# Patient Record
Sex: FEMALE | Race: WHITE | NOT HISPANIC OR LATINO | Employment: OTHER | URBAN - NONMETROPOLITAN AREA
[De-identification: names, ages, dates, MRNs, and addresses within clinical notes are randomized per-mention and may not be internally consistent; named-entity substitution may affect disease eponyms.]

---

## 2017-03-30 ENCOUNTER — HOSPITAL ENCOUNTER (OUTPATIENT)
Dept: LAB | Facility: MEDICAL CENTER | Age: 62
End: 2017-03-30
Attending: PHYSICIAN ASSISTANT
Payer: COMMERCIAL

## 2017-03-30 ENCOUNTER — OFFICE VISIT (OUTPATIENT)
Dept: URGENT CARE | Facility: PHYSICIAN GROUP | Age: 62
End: 2017-03-30
Payer: COMMERCIAL

## 2017-03-30 ENCOUNTER — APPOINTMENT (OUTPATIENT)
Dept: RADIOLOGY | Facility: IMAGING CENTER | Age: 62
End: 2017-03-30
Attending: PHYSICIAN ASSISTANT
Payer: COMMERCIAL

## 2017-03-30 VITALS
WEIGHT: 270 LBS | TEMPERATURE: 97 F | SYSTOLIC BLOOD PRESSURE: 144 MMHG | BODY MASS INDEX: 47.07 KG/M2 | DIASTOLIC BLOOD PRESSURE: 92 MMHG | OXYGEN SATURATION: 90 % | RESPIRATION RATE: 18 BRPM | HEART RATE: 112 BPM

## 2017-03-30 DIAGNOSIS — R06.02 SOB (SHORTNESS OF BREATH): ICD-10-CM

## 2017-03-30 DIAGNOSIS — R60.9 EDEMA, UNSPECIFIED TYPE: ICD-10-CM

## 2017-03-30 PROCEDURE — 99203 OFFICE O/P NEW LOW 30 MIN: CPT | Performed by: PHYSICIAN ASSISTANT

## 2017-03-30 PROCEDURE — 71020 DX-CHEST-2 VIEWS: CPT | Mod: TC | Performed by: PHYSICIAN ASSISTANT

## 2017-03-30 RX ORDER — ALBUTEROL SULFATE 90 UG/1
2 AEROSOL, METERED RESPIRATORY (INHALATION) EVERY 6 HOURS PRN
COMMUNITY
End: 2017-06-19 | Stop reason: SDUPTHER

## 2017-03-30 RX ORDER — PREDNISONE 20 MG/1
20 TABLET ORAL DAILY
COMMUNITY
End: 2017-04-25

## 2017-03-30 RX ORDER — AZITHROMYCIN 1 G/1
1 POWDER, FOR SUSPENSION ORAL ONCE
COMMUNITY
End: 2017-03-31

## 2017-03-30 RX ORDER — LISINOPRIL AND HYDROCHLOROTHIAZIDE 12.5; 1 MG/1; MG/1
1 TABLET ORAL DAILY
Qty: 30 TAB | Refills: 0 | Status: SHIPPED | OUTPATIENT
Start: 2017-03-30 | End: 2017-04-25 | Stop reason: SDUPTHER

## 2017-03-30 NOTE — MR AVS SNAPSHOT
Mallorie Xie   3/30/2017 9:15 AM   Office Visit   MRN: [id number]    Department:  [facility]   Dept Phone:  [phone]    Description:  Female : 1955   Provider:  Misha Waters PA-C           Reason for Visit     Follow-Up SOB      Allergies as of 3/30/2017     No Known Allergies      You were diagnosed with     SOB (shortness of breath)   [066509]       Edema, unspecified type   [[id number]]         Vital Signs     Blood Pressure Pulse Temperature Respirations Weight Oxygen Saturation    144/92 mmHg 112 36.1 °C (97 °F) 18 122.471 kg (270 lb) 90%    Smoking Status                   Former Smoker           Basic Information     Date Of Birth Sex Race Ethnicity Preferred Language    1955 Female White Non- English      Your appointments     Mar 31, 2017  6:15 AM   Adult Draw/Collection with [facility] (--)    [address] [place] [address]   [phone]            Mar 31, 2017  9:20 AM   NEW TO YOU with KRISTAL Edmonds   [facility] [place]    [address]  [place] [id number] [phone]              Problem List              ICD-10-CM Priority Class Noted - Resolved    Tobacco abuse Z72.0   Unknown - Present    Radiculopathy of arm M54.10   2012 - Present    Upper extremity pain, diffuse M79.603   2012 - Present    Left shoulder pain M25.512   2014 - Present    Upper back pain on left side M54.9   2014 - Present      Health Maintenance        Date Due Completion Dates    IMM DTaP/Tdap/Td Vaccine (1 - Tdap) 1972 ---    IMM PNEUMOCOCCAL 19-64 (ADULT) MEDIUM RISK SERIES (1 of 1 - PPSV23) 1972 ---    PAP SMEAR 1974 ---    MAMMOGRAM 1993 ---    COLONOSCOPY 2003 ---    IMM ZOSTER VACCINE 2013 ---    IMM INFLUENZA (1) 2016 ---            Current Immunizations     No immunizations on file.      Below and/or attached are the medications your provider expects you to  take. Review all of your home medications and newly ordered medications with your provider and/or pharmacist. Follow medication instructions as directed by your provider and/or pharmacist. Please keep your medication list with you and share with your provider. Update the information when medications are discontinued, doses are changed, or new medications (including over-the-counter products) are added; and carry medication information at all times in the event of emergency situations     Allergies:  No Known Allergies          Medications  Valid as of: March 30, 2017 - 10:59 AM    Generic Name Brand Name Tablet Size Instructions for use    Albuterol Sulfate (Aero Soln) albuterol 108 (90 BASE) MCG/ACT Inhale 2 Puffs by mouth every 6 hours as needed for Shortness of Breath.        Azithromycin (Pack) ZITHROMAX 1 GM Take 1 Packet by mouth Once.        Carisoprodol (Tab) SOMA 350 MG Take 1 Tab by mouth 2 times a day as needed for Muscle Spasms.        Cyclobenzaprine HCl (Tab) FLEXERIL 5 MG Take 1-2 Tabs by mouth at bedtime as needed for Mild Pain.        Hydrocodone-Acetaminophen (Tab) NORCO  MG Take 1 Tab by mouth every 6 hours as needed for Mild Pain.        Lisinopril-Hydrochlorothiazide (Tab) PRINZIDE, ZESTORETIC 10-12.5 MG Take 1 Tab by mouth every day.        Naproxen (Tab) NAPROSYN 500 MG Take 1 Tab by mouth 2 times a day, with meals.        Polymyxin B-Trimethoprim (Solution) POLYTRIM 82272-2.1 UNIT/ML-% Place 1 Drop in both eyes every 4 hours.        PredniSONE (Tab) DELTASONE 20 MG Take 20 mg by mouth every day.        TiZANidine HCl (Tab) ZANAFLEX 4 MG Take 1 Tab by mouth every 6 hours as needed.        TraMADol HCl (Tab) ULTRAM 50 MG Take 1-2 Tabs by mouth every four hours as needed for Mild Pain.        .                 Medicines prescribed today were sent to:     [facility] - [address]    [address] [address]    Phone: [phone] Fax:  [phone]    Open 24 Hours?: No      Medication refill instructions:       If your prescription bottle indicates you have medication refills left, it is not necessary to call your provider’s office. Please contact your pharmacy and they will refill your medication.    If your prescription bottle indicates you do not have any refills left, you may request refills at any time through one of the following ways: The online App DreamWorks system (except Urgent Care), by calling your provider’s office, or by asking your pharmacy to contact your provider’s office with a refill request. Medication refills are processed only during regular business hours and may not be available until the next business day. Your provider may request additional information or to have a follow-up visit with you prior to refilling your medication.   *Please Note: Medication refills are assigned a new Rx number when refilled electronically. Your pharmacy may indicate that no refills were authorized even though a new prescription for the same medication is available at the pharmacy. Please request the medicine by name with the pharmacy before contacting your provider for a refill.        Your To Do List     Future Labs/Procedures Complete By Expires    DX-CHEST-2 VIEWS  As directed 3/30/2018         App DreamWorks Access Code: [email or web address]  Expires: 4/29/2017  8:59 AM    App DreamWorks  A secure, online tool to manage your health information     [employer]’s App DreamWorks® is a secure, online tool that connects you to your personalized health information from the privacy of your home -- day or night - making it very easy for you to manage your healthcare. Once the activation process is completed, you can even access your medical information using the App DreamWorks anupama, which is available for free in the Apple Anupama store or Google Play store.     App DreamWorks provides the following levels of access (as shown below):   My Chart Features   [facility] Primary Care Doctor  [facility]  Specialists [facility]  Urgent  Care Non-[facility]  Primary Care  Doctor   Email your healthcare team securely and privately 24/7 X X X    Manage appointments: schedule your next appointment; view details of past/upcoming appointments X      Request prescription refills. X      View recent personal medical records, including lab and immunizations X X X X   View health record, including health history, allergies, medications X X X X   Read reports about your outpatient visits, procedures, consult and ER notes X X X X   See your discharge summary, which is a recap of your hospital and/or ER visit that includes your diagnosis, lab results, and care plan. X X       How to register for [employer]:  1. Go to  [email or web address].  2. Click on the Sign Up Now box, which takes you to the New Member Sign Up page. You will need to provide the following information:  a. Enter your CityS[employer]er Access Code exactly as it appears at the top of this page. (You will not need to use this code after you’ve completed the sign-up process. If you do not sign up before the expiration date, you must request a new code.)   b. Enter your date of birth.   c. Enter your home email address.   d. Click Submit, and follow the next screen’s instructions.  3. Create a CitySlicker ID. This will be your [employer]er login ID and cannot be changed, so think of one that is secure and easy to remember.  4. Create a [employer]er password. You can change your password at any time.  5. Enter your Password Reset Question and Answer. This can be used at a later time if you forget your password.   6. Enter your e-mail address. This allows you to receive e-mail notifications when new information is available in [employer]Slicker.  7. Click Sign Up. You can now view your health information.    For assistance activating your [employer]er account, call [phone]

## 2017-03-30 NOTE — PROGRESS NOTES
Chief Complaint   Patient presents with   • Follow-Up     SOB       HISTORY OF PRESENT ILLNESS: Patient is a 61 y.o. female who presents today because she has had a 2 to three-week history of a cough. She reports over 70 pound weight gain in the last 6 months since she has quit smoking.  she denies any fevers, chills, nausea, vomiting or diarrhea. She was seen in a different urgent care 4 days ago, prescribed azithromycin, albuterol inhaler, steroids. She has had no improvement with her symptoms since then. She does report a new onset of some lower extremity edema over the last 4 or 5 days. She has not had any improvement with her current regimen. She reports increasing shortness of breath with activity, no shortness of breath at rest. She denies any specific orthopnea, but no significant difference from her regular activity-related shortness of breath recently.    Patient Active Problem List    Diagnosis Date Noted   • Left shoulder pain 08/27/2014   • Upper back pain on left side 08/27/2014   • Radiculopathy of arm 12/04/2012   • Upper extremity pain, diffuse 12/04/2012   • Tobacco abuse        Allergies:Review of patient's allergies indicates no known allergies.    Current Outpatient Prescriptions Ordered in [facility]   Medication Sig Dispense Refill   • albuterol 108 (90 BASE) MCG/ACT Aero Soln inhalation aerosol Inhale 2 Puffs by mouth every 6 hours as needed for Shortness of Breath.     • predniSONE (DELTASONE) 20 MG Tab Take 20 mg by mouth every day.     • azithromycin (ZITHROMAX) 1 GM powder Take 1 Packet by mouth Once.     • lisinopril-hydrochlorothiazide (PRINZIDE, ZESTORETIC) 10-12.5 MG per tablet Take 1 Tab by mouth every day. 30 Tab 0   • cyclobenzaprine (FLEXERIL) 5 MG tablet Take 1-2 Tabs by mouth at bedtime as needed for Mild Pain. 30 Tab 0   • naproxen (NAPROSYN) 500 MG TABS Take 1 Tab by mouth 2 times a day, with meals. 60 Tab 3   • hydrocodone/acetaminophen (NORCO)  MG TABS Take 1 Tab by mouth  every 6 hours as needed for Mild Pain. 60 Each 1   • tramadol (ULTRAM) 50 MG TABS Take 1-2 Tabs by mouth every four hours as needed for Mild Pain. 30 Tab 0   • tizanidine (ZANAFLEX) 4 MG TABS Take 1 Tab by mouth every 6 hours as needed. 30 Tab 1   • carisoprodol (SOMA) 350 MG TABS Take 1 Tab by mouth 2 times a day as needed for Muscle Spasms. 60 Each 0   • polymixin-trimethoprim (POLYTRIM) 80202-7.1 UNIT/ML-% SOLN Place 1 Drop in both eyes every 4 hours. 15 mL 0     No current Epic-ordered facility-administered medications on file.       Past Medical History   Diagnosis Date   • Obesity    • Tobacco abuse        Social History   Substance Use Topics   • Smoking status: Former Smoker -- 0.50 packs/day     Quit date: 10/30/2016   • Smokeless tobacco: Never Used   • Alcohol Use: No       No family status information on file.     Family History   Problem Relation Age of Onset   • Arthritis Mother        ROS:  Review of Systems   Constitutional: Negative for fever, chills, weight loss and malaise/fatigue.   HENT: Negative for ear pain, nosebleeds, congestion, sore throat and neck pain.    Eyes: Negative for blurred vision.   Respiratory: Positive for cough, no sputum production, positive for shortness of breath and no wheezing.    Cardiovascular: Negative for chest pain, palpitations, orthopnea and positive for leg swelling.   Gastrointestinal: Negative for heartburn, nausea, vomiting and abdominal pain.   Genitourinary: Negative for dysuria, urgency and frequency.     Exam:  Blood pressure 144/92, pulse 112, temperature 36.1 °C (97 °F), resp. rate 18, weight 122.471 kg (270 lb), SpO2 88 %.  General:  Well nourished, well developed female in NAD  Head:Normocephalic, atraumatic  Eyes: PERRLA, EOM within normal limits, no conjunctival injection, no scleral icterus, visual fields and acuity grossly intact.  Ears: Normal shape and symmetry, no tenderness, no discharge. External canals are without any significant edema or  erythema. Tympanic membranes are without any inflammation, no effusion. Gross auditory acuity is intact  Nose: Symmetrical without tenderness, no discharge.  Mouth: reasonable hygiene, no erythema exudates or tonsillar enlargement.  Neck: no masses, range of motion within normal limits, no tracheal deviation. No obvious thyroid enlargement.  Pulmonary: chest is symmetrical with respiration, no wheezes, crackles, or rhonchi.  Cardiovascular: regular rate and rhythm without murmurs, rubs, or gallops.  Extremities: no clubbing, cyanosis, has 1-2+ edema, although difficult to assess secondary to body habitus    Chest x-ray per radiology interpretation:  1.  Enlarged cardiac silhouette without evidence of failure.  2.  Questionable enlargement of the vascular structures in the right hilum versus underlying mass. CT of the chest with contrast is recommended.  3.  Ill-defined lower lobe opacities could be due to atelectasis or artifact because of patient's large body habitus.             Please note that this dictation was created using voice recognition software. I have made every reasonable attempt to correct obvious errors, but I expect that there are errors of grammar and possibly content that I did not discover before finalizing the note.    Assessment/Plan:  1. SOB (shortness of breath)  DX-CHEST-2 VIEWS   2. Edema, unspecified type  CBC WITH DIFFERENTIAL    COMP METABOLIC PANEL    LIPID PANEL    TSH    B TYPE NATRIURETIC    lisinopril-hydrochlorothiazide (PRINZIDE, ZESTORETIC) 10-12.5 MG per tablet    expressed my concern to the patient regarding her current symptoms, no evidence of acute CHF on chest x-ray but she does have an enlarged cardiac silhouette. I have recommended the above medication, ordered labs. And gave her very specific instructions to go to the emergency room in the next 2-3 days if she is not significantly improving, sooner if symptoms worsen or change.    Followup with primary care in the next  7-10 days if not significantly improving, return to the urgent care or go to the emergency room sooner for any worsening of symptoms.

## 2017-03-31 ENCOUNTER — OFFICE VISIT (OUTPATIENT)
Dept: MEDICAL GROUP | Facility: PHYSICIAN GROUP | Age: 62
End: 2017-03-31
Payer: COMMERCIAL

## 2017-03-31 ENCOUNTER — HOSPITAL ENCOUNTER (OUTPATIENT)
Dept: LAB | Facility: MEDICAL CENTER | Age: 62
End: 2017-03-31
Attending: PHYSICIAN ASSISTANT
Payer: COMMERCIAL

## 2017-03-31 ENCOUNTER — TELEPHONE (OUTPATIENT)
Dept: MEDICAL GROUP | Facility: PHYSICIAN GROUP | Age: 62
End: 2017-03-31

## 2017-03-31 VITALS
OXYGEN SATURATION: 88 % | SYSTOLIC BLOOD PRESSURE: 132 MMHG | DIASTOLIC BLOOD PRESSURE: 90 MMHG | WEIGHT: 263 LBS | BODY MASS INDEX: 46.6 KG/M2 | HEART RATE: 92 BPM | HEIGHT: 63 IN | TEMPERATURE: 98.8 F | RESPIRATION RATE: 24 BRPM

## 2017-03-31 DIAGNOSIS — Z72.0 TOBACCO ABUSE: ICD-10-CM

## 2017-03-31 DIAGNOSIS — E66.01 MORBID OBESITY WITH BMI OF 45.0-49.9, ADULT (HCC): ICD-10-CM

## 2017-03-31 DIAGNOSIS — R93.89 ABNORMAL CHEST X-RAY: ICD-10-CM

## 2017-03-31 DIAGNOSIS — Z87.891 FORMER CIGARETTE SMOKER: ICD-10-CM

## 2017-03-31 DIAGNOSIS — I10 ESSENTIAL HYPERTENSION: ICD-10-CM

## 2017-03-31 DIAGNOSIS — R05.9 COUGH: ICD-10-CM

## 2017-03-31 DIAGNOSIS — R06.02 SHORTNESS OF BREATH: ICD-10-CM

## 2017-03-31 LAB
ALBUMIN SERPL BCP-MCNC: 3.9 G/DL (ref 3.2–4.9)
ALBUMIN/GLOB SERPL: 1.3 G/DL
ALP SERPL-CCNC: 75 U/L (ref 30–99)
ALT SERPL-CCNC: 23 U/L (ref 2–50)
ANION GAP SERPL CALC-SCNC: 5 MMOL/L (ref 0–11.9)
AST SERPL-CCNC: 17 U/L (ref 12–45)
BASOPHILS # BLD AUTO: 0.07 K/UL (ref 0–0.12)
BASOPHILS NFR BLD AUTO: 0.7 % (ref 0–1.8)
BILIRUB SERPL-MCNC: 0.8 MG/DL (ref 0.1–1.5)
BNP SERPL-MCNC: 52 PG/ML (ref 0–100)
BUN SERPL-MCNC: 20 MG/DL (ref 8–22)
CALCIUM SERPL-MCNC: 9.7 MG/DL (ref 8.5–10.5)
CHLORIDE SERPL-SCNC: 98 MMOL/L (ref 96–112)
CHOLEST SERPL-MCNC: 211 MG/DL (ref 100–199)
CO2 SERPL-SCNC: 35 MMOL/L (ref 20–33)
CREAT SERPL-MCNC: 0.68 MG/DL (ref 0.5–1.4)
EOSINOPHIL # BLD: 0.11 K/UL (ref 0–0.51)
EOSINOPHIL NFR BLD AUTO: 1.1 % (ref 0–6.9)
ERYTHROCYTE [DISTWIDTH] IN BLOOD BY AUTOMATED COUNT: 49.1 FL (ref 35.9–50)
GLOBULIN SER CALC-MCNC: 3.1 G/DL (ref 1.9–3.5)
GLUCOSE SERPL-MCNC: 80 MG/DL (ref 65–99)
HCT VFR BLD AUTO: 52.9 % (ref 37–47)
HDLC SERPL-MCNC: 68 MG/DL
HGB BLD-MCNC: 17.2 G/DL (ref 12–16)
IMM GRANULOCYTES # BLD AUTO: 0.05 K/UL (ref 0–0.11)
IMM GRANULOCYTES NFR BLD AUTO: 0.5 % (ref 0–0.9)
LDLC SERPL CALC-MCNC: 107 MG/DL
LYMPHOCYTES # BLD: 2.18 K/UL (ref 1–4.8)
LYMPHOCYTES NFR BLD AUTO: 22.6 % (ref 22–41)
MCH RBC QN AUTO: 30.8 PG (ref 27–33)
MCHC RBC AUTO-ENTMCNC: 32.5 G/DL (ref 33.6–35)
MCV RBC AUTO: 94.8 FL (ref 81.4–97.8)
MONOCYTES # BLD: 0.7 K/UL (ref 0–0.85)
MONOCYTES NFR BLD AUTO: 7.2 % (ref 0–13.4)
NEUTROPHILS # BLD: 6.55 K/UL (ref 2–7.15)
NEUTROPHILS NFR BLD AUTO: 67.9 % (ref 44–72)
NRBC # BLD AUTO: 0 K/UL
NRBC BLD-RTO: 0 /100 WBC
PLATELET # BLD AUTO: 198 K/UL (ref 164–446)
PMV BLD AUTO: 11.6 FL (ref 9–12.9)
POTASSIUM SERPL-SCNC: 4.2 MMOL/L (ref 3.6–5.5)
PROT SERPL-MCNC: 7 G/DL (ref 6–8.2)
RBC # BLD AUTO: 5.58 M/UL (ref 4.2–5.4)
SODIUM SERPL-SCNC: 138 MMOL/L (ref 135–145)
TRIGL SERPL-MCNC: 180 MG/DL (ref 0–149)
TSH SERPL DL<=0.005 MIU/L-ACNC: 9.41 UIU/ML (ref 0.3–3.7)
WBC # BLD AUTO: 9.7 K/UL (ref 4.8–10.8)

## 2017-03-31 PROCEDURE — 80053 COMPREHEN METABOLIC PANEL: CPT

## 2017-03-31 PROCEDURE — 84443 ASSAY THYROID STIM HORMONE: CPT

## 2017-03-31 PROCEDURE — 83880 ASSAY OF NATRIURETIC PEPTIDE: CPT

## 2017-03-31 PROCEDURE — 85025 COMPLETE CBC W/AUTO DIFF WBC: CPT

## 2017-03-31 PROCEDURE — 99214 OFFICE O/P EST MOD 30 MIN: CPT | Performed by: NURSE PRACTITIONER

## 2017-03-31 PROCEDURE — 80061 LIPID PANEL: CPT

## 2017-03-31 PROCEDURE — 36415 COLL VENOUS BLD VENIPUNCTURE: CPT

## 2017-03-31 RX ORDER — AZITHROMYCIN 250 MG/1
TABLET, FILM COATED ORAL
COMMUNITY
Start: 2017-03-27 | End: 2017-04-25

## 2017-03-31 ASSESSMENT — PATIENT HEALTH QUESTIONNAIRE - PHQ9: CLINICAL INTERPRETATION OF PHQ2 SCORE: 0

## 2017-03-31 NOTE — PROGRESS NOTES
Chief Complaint   Patient presents with   • Shortness of Breath     [facility]         This is a 61 y.o.female patient that presents today with the following: Follow-up urgent care visit, acute and chronic conditions    Former cigarette smoker  Patient is former cigarette smoker, recently quit smoking in November 2016. She smoked for approximately 46 years, at least a pack a day. She does have a concern for lung cancer. Her sister was recently diagnosed with lung cancer. She does deny symptoms of lung cancer even though she is currently recovering from an upper respiratory illness. I did discuss with her our lung cancer screening program and she is interested in being referred to it. Referral has been placed.    Morbid obesity with BMI of 45.0-49.9, adult (HCC)  This is chronic in nature, uncontrolled. Patient's weight is 263 pounds, BMI 46.59. She does admit to excessive calorie intake, especially since tobacco cessation. She has in the last week made significant dietary changes, but has been unable to increase her physical activity due to her current respiratory illness. When she is feeling better she plans on increasing her physical activity and will continue with her dietary changes. She does understand the risks her obesity is imposing on her health especially her breathing.    Cough  Patient seen in [facility] urgent care yesterday for 2 week history of cough. She was seen in a different urgent care and started on Zithromax, prednisone and albuterol inhaler without significant improvement. She did have an abnormal chest x-ray done yesterday, showed questionable mass of the right hilum versus enlargement of the vascular structure--follow-up CT with contrast was recommended. We will order that today. She was also started on lisinopril-hydrochlorothiazide 10-12.5 mg. Her blood pressure today is 132/90, but she does admit to not taking this medication today. Labs were ordered as well, these were drawn this morning.  I told her that I will not have these results until Monday, and which I will call her with results if abnormal. Otherwise we will discuss at her follow-up appointment with me in one week.  Her oxygen saturation upon initial measurements of vital signs were 88%. She had not used her albuterol inhaler for a few hours, I instructed her to use as directed, and after 5 minutes her saturations were up to 92-93% on room air. I encouraged her to use this as directed, 2 puffs every 4-6 hours as needed. Because she is a past smoker, I did discuss the possibility of COPD and the need to be referred to pulmonology as well. In the interim, we will start her on a steroid inhaler, Advair 250-50 one puff twice a day.  Because she has to walk up and down stairs for her job, I would like her to stay out of work for at least one more week and she is to follow-up with me next Friday on April 7. Note was given for work. She was given strict precautions to go to the emergency room for worsening shortness of breath and cough, chest pain. Patient verbalized understanding and is agreeable to plan.    Shortness of breath  See additional notes    Essential hypertension  Fairly new condition, recently diagnosed in urgent care and started on lisinopril-hydrochlorothiazide. Blood pressure today is 132/90, however she did not take her medication this morning. Thus far, she has tolerated the medication well with no significant or bothersome side effects--she has only taken for one day though. I did discuss the risks, benefits and common side effects of this medication. She is continue taking as prescribed. She has had labs drawn today, I will call her with results and further actions if needed.      No results found for any previous visit.      clinical course has fluctuated    Past Medical History   Diagnosis Date   • Obesity    • Tobacco abuse    • Essential hypertension 3/31/2017       No past surgical history on file.    Family History  "  Problem Relation Age of Onset   • Arthritis Mother        Bee venom    Current Outpatient Prescriptions Ordered in [facility]   Medication Sig Dispense Refill   • fluticasone-salmeterol (ADVAIR) 250-50 MCG/DOSE AEROSOL POWDER, BREATH ACTIVATED Inhale 1 Puff by mouth every 12 hours. 1 Inhaler 1   • albuterol 108 (90 BASE) MCG/ACT Aero Soln inhalation aerosol Inhale 2 Puffs by mouth every 6 hours as needed for Shortness of Breath.     • predniSONE (DELTASONE) 20 MG Tab Take 20 mg by mouth every day.     • lisinopril-hydrochlorothiazide (PRINZIDE, ZESTORETIC) 10-12.5 MG per tablet Take 1 Tab by mouth every day. 30 Tab 0   • azithromycin (ZITHROMAX) 250 MG Tab        No current Epic-ordered facility-administered medications on file.       Constitutional ROS: No unexpected change in weight, No weakness, No unexplained fevers, sweats, or chills  Neck ROS: No lumps or masses, No swollen glands, No significant pain in neck  Pulmonary ROS: Positive per HPI  Cardiovascular ROS: No chest pain, No palpitations, Positive for edema, BLE  Gastrointestinal ROS: No abdominal pain, No nausea, vomiting, diarrhea, or constipation, no blood in stool  Musculoskeletal/Extremities ROS: No clubbing, No cyanosis, No pain, redness or swelling on the joints  Neurologic ROS: Normal development, No seizures, No weakness    Physical exam:  /90 mmHg  Pulse 92  Temp(Src) 37.1 °C (98.8 °F)  Resp 24  Ht 1.6 m (5' 3\")  Wt 119.296 kg (263 lb)  BMI 46.60 kg/m2  SpO2 88%  General Appearance: -aged female, middlealert, mild distress, morbidly obese  Skin: Skin color, texture, turgor normal. No rashes or lesions.  Lungs: negative findings: normal respiratory rate and rhythm, lungs clear to auscultation  Heart: negative. RRR without murmur, gallop, or rubs.  No ectopy.  Abdomen: Abdomen soft, non-tender. BS normal. No masses,  No organomegaly  Musculoskeletal: negative  Neurologic: intact, oriented, mood appropriate, judgement intact. Cranial " nerves grossly intact    Medical decision making/discussion: Pt here s/p UC and ER visit and persist cough and shortness of breath. Encouraged pt to use albuterol inhaler consistently as prescribed. Will get CT of chest as recommended, as CXR was abnormal. She was referred to lung cancer screening program due to history. She was given note for work and I will see her in follow up in 1 week. She is to go to ER for worsening symptoms as discussed.     Mallorie was seen today for shortness of breath.    Diagnoses and all orders for this visit:    Shortness of breath  -     fluticasone-salmeterol (ADVAIR) 250-50 MCG/DOSE AEROSOL POWDER, BREATH ACTIVATED; Inhale 1 Puff by mouth every 12 hours.    Cough  -     CT-CHEST (THORAX) WITH; Future    Essential hypertension    Former cigarette smoker  -     REFERRAL TO LUNG CANCER SCREENING PROGRAM    Tobacco abuse    Morbid obesity with BMI of 45.0-49.9, adult (CMS-[facility])  -     Patient identified as having weight management issue.  Appropriate orders and counseling given.    Abnormal chest x-ray  -     CT-CHEST (THORAX) WITH; Future          Please note that this dictation was created using voice recognition software. I have made every reasonable attempt to correct obvious errors, but I expect that there are errors of grammar and possibly content that I did not discover before finalizing the note.

## 2017-03-31 NOTE — ASSESSMENT & PLAN NOTE
Patient seen in [facility] urgent care yesterday for 2 week history of cough. She was seen in a different urgent care and started on Zithromax, prednisone and albuterol inhaler without significant improvement. She did have an abnormal chest x-ray done yesterday, showed questionable mass of the right hilum versus enlargement of the vascular structure--follow-up CT with contrast was recommended. We will order that today. She was also started on lisinopril-hydrochlorothiazide 10-12.5 mg. Her blood pressure today is 132/90, but she does admit to not taking this medication today. Labs were ordered as well, these were drawn this morning. I told her that I will not have these results until Monday, and which I will call her with results if abnormal. Otherwise we will discuss at her follow-up appointment with me in one week.  Her oxygen saturation upon initial measurements of vital signs were 88%. She had not used her albuterol inhaler for a few hours, I instructed her to use as directed, and after 5 minutes her saturations were up to 92-93% on room air. I encouraged her to use this as directed, 2 puffs every 4-6 hours as needed. Because she is a past smoker, I did discuss the possibility of COPD and the need to be referred to pulmonology as well. In the interim, we will start her on a steroid inhaler, Advair 250-50 one puff twice a day.  Because she has to walk up and down stairs for her job, I would like her to stay out of work for at least one more week and she is to follow-up with me next Friday on April 7. Note was given for work. She was given strict precautions to go to the emergency room for worsening shortness of breath and cough, chest pain. Patient verbalized understanding and is agreeable to plan.

## 2017-03-31 NOTE — ASSESSMENT & PLAN NOTE
This is chronic in nature, uncontrolled. Patient's weight is 263 pounds, BMI 46.59. She does admit to excessive calorie intake, especially since tobacco cessation. She has in the last week made significant dietary changes, but has been unable to increase her physical activity due to her current respiratory illness. When she is feeling better she plans on increasing her physical activity and will continue with her dietary changes. She does understand the risks her obesity is imposing on her health especially her breathing.

## 2017-03-31 NOTE — ASSESSMENT & PLAN NOTE
Fairly new condition, recently diagnosed in urgent care and started on lisinopril-hydrochlorothiazide. Blood pressure today is 132/90, however she did not take her medication this morning. Thus far, she has tolerated the medication well with no significant or bothersome side effects--she has only taken for one day though. I did discuss the risks, benefits and common side effects of this medication. She is continue taking as prescribed. She has had labs drawn today, I will call her with results and further actions if needed.

## 2017-03-31 NOTE — MR AVS SNAPSHOT
"        Mallorie Xie   3/31/2017 9:20 AM   Office Visit   MRN: [id number]    Department:  [facility]   Dept Phone:  [phone]    Description:  Female : 1955   Provider:  KRISTAL Edmonds           Reason for Visit     Shortness of Breath UC [facility]      Allergies as of 3/31/2017     Allergen Noted Reactions    Bee Venom 2017   Anaphylaxis      You were diagnosed with     Essential hypertension   [6261055]       Morbid obesity with BMI of 45.0-49.9, adult (CMS-HCC)   [872678]       Tobacco abuse   [479131]       Former cigarette smoker   [960662]       Shortness of breath   [786.05.ICD-9-CM]         Vital Signs     Blood Pressure Pulse Temperature Respirations Height Weight    132/90 mmHg 92 37.1 °C (98.8 °F) 24 1.6 m (5' 3\") 119.296 kg (263 lb)    Body Mass Index Oxygen Saturation Smoking Status             46.60 kg/m2 88% Former Smoker         Basic Information     Date Of Birth Sex Race Ethnicity Preferred Language    1955 Female White Non- English      Problem List              ICD-10-CM Priority Class Noted - Resolved    Tobacco abuse Z72.0   Unknown - Present    Radiculopathy of arm M54.10   2012 - Present    Upper extremity pain, diffuse M79.603   2012 - Present    Left shoulder pain M25.512   2014 - Present    Upper back pain on left side M54.9   2014 - Present    Essential hypertension I10   3/31/2017 - Present    Morbid obesity with BMI of 45.0-49.9, adult ([facility]) E66.01, Z68.42   3/31/2017 - Present      Health Maintenance        Date Due Completion Dates    IMM DTaP/Tdap/Td Vaccine (1 - Tdap) 1972 ---    IMM PNEUMOCOCCAL 19-64 (ADULT) MEDIUM RISK SERIES (1 of 1 - PPSV23) 1972 ---    PAP SMEAR 1974 ---    MAMMOGRAM 1993 ---    COLONOSCOPY 2003 ---    IMM ZOSTER VACCINE 2013 ---    IMM INFLUENZA (1) 2016 ---            Current Immunizations     No immunizations on file.      Below and/or attached are the medications " your provider expects you to take. Review all of your home medications and newly ordered medications with your provider and/or pharmacist. Follow medication instructions as directed by your provider and/or pharmacist. Please keep your medication list with you and share with your provider. Update the information when medications are discontinued, doses are changed, or new medications (including over-the-counter products) are added; and carry medication information at all times in the event of emergency situations     Allergies:  BEE VENOM - Anaphylaxis               Medications  Valid as of: March 31, 2017 -  9:46 AM    Generic Name Brand Name Tablet Size Instructions for use    Albuterol Sulfate (Aero Soln) albuterol 108 (90 BASE) MCG/ACT Inhale 2 Puffs by mouth every 6 hours as needed for Shortness of Breath.        Azithromycin (Tab) ZITHROMAX 250 MG         Fluticasone-Salmeterol (AEROSOL POWDER, BREATH ACTIVATED) ADVAIR 250-50 MCG/DOSE Inhale 1 Puff by mouth every 12 hours.        Lisinopril-Hydrochlorothiazide (Tab) PRINZIDE, ZESTORETIC 10-12.5 MG Take 1 Tab by mouth every day.        PredniSONE (Tab) DELTASONE 20 MG Take 20 mg by mouth every day.        .                 Medicines prescribed today were sent to:     [facility] [address] [place] [id number]    Phone: [phone] Fax: [phone]    Open 24 Hours?: No      Medication refill instructions:       If your prescription bottle indicates you have medication refills left, it is not necessary to call your provider’s office. Please contact your pharmacy and they will refill your medication.    If your prescription bottle indicates you do not have any refills left, you may request refills at any time through one of the following ways: The online [employer] system (except Urgent Care), by calling your provider’s office, or by asking your pharmacy to contact your provider’s office with a  refill request. Medication refills are processed only during regular business hours and may not be available until the next business day. Your provider may request additional information or to have a follow-up visit with you prior to refilling your medication.   *Please Note: Medication refills are assigned a new Rx number when refilled electronically. Your pharmacy may indicate that no refills were authorized even though a new prescription for the same medication is available at the pharmacy. Please request the medicine by name with the pharmacy before contacting your provider for a refill.        Instructions    Note for work    Follow up with me in 1 week    Start Advair--1 puff twice a day, but use rescue inhaler as needed, 2 puffs every 4-6 hours as needed    Referral to lung cancer screening program    Will call you if labs are significantly abnormal--otherwise will go over them at follow up appt          [employer] Access Code: [email or web address]  Expires: 4/29/2017  8:59 AM    EMED Co  A secure, online tool to manage your health information     [employer] EMED Co® is a secure, online tool that connects you to your personalized health information from the privacy of your home -- day or night - making it very easy for you to manage your healthcare. Once the activation process is completed, you can even access your medical information using the EMED Co anupama, which is available for free in the Apple Anupama store or Google Play store.     EMED Co provides the following levels of access (as shown below):   My Chart Features   [facility] Doctor [facility]  Specialists [facility]  Urgent  Care Non-[facility]  Primary Care  Doctor   Email your healthcare team securely and privately 24/7 X X X    Manage appointments: schedule your next appointment; view details of past/upcoming appointments X      Request prescription refills. X      View recent personal medical records, including lab and immunizations X X X X   View health  record, including health history, allergies, medications X X X X   Read reports about your outpatient visits, procedures, consult and ER notes X X X X   See your discharge summary, which is a recap of your hospital and/or ER visit that includes your diagnosis, lab results, and care plan. X X       How to register for Inherited Health:  1. Go to  [email or web address].  2. Click on the Sign Up Now box, which takes you to the New Member Sign Up page. You will need to provide the following information:  a. Enter your Inherited Health Access Code exactly as it appears at the top of this page. (You will not need to use this code after you’ve completed the sign-up process. If you do not sign up before the expiration date, you must request a new code.)   b. Enter your date of birth.   c. Enter your home email address.   d. Click Submit, and follow the next screen’s instructions.  3. Create a Inherited Health ID. This will be your Inherited Health login ID and cannot be changed, so think of one that is secure and easy to remember.  4. Create a [employer] password. You can change your password at any time.  5. Enter your Password Reset Question and Answer. This can be used at a later time if you forget your password.   6. Enter your e-mail address. This allows you to receive e-mail notifications when new information is available in Inherited Health.  7. Click Sign Up. You can now view your health information.    For assistance activating your Inherited Health account, call [phone]

## 2017-03-31 NOTE — PATIENT INSTRUCTIONS
Note for work    Follow up with me in 1 week    Start Advair--1 puff twice a day, but use rescue inhaler as needed, 2 puffs every 4-6 hours as needed    Referral to lung cancer screening program    Will call you if labs are significantly abnormal--otherwise will go over them at follow up appt

## 2017-03-31 NOTE — TELEPHONE ENCOUNTER
Please let patient know that after reviewing the results of her chest x-ray yesterday, it is recommended that we follow this with a CT of her chest, this has been ordered.

## 2017-03-31 NOTE — ASSESSMENT & PLAN NOTE
Patient is former cigarette smoker, recently quit smoking in November 2016. She smoked for approximately 46 years, at least a pack a day. She does have a concern for lung cancer. Her sister was recently diagnosed with lung cancer. She does deny symptoms of lung cancer even though she is currently recovering from an upper respiratory illness. I did discuss with her our lung cancer screening program and she is interested in being referred to it. Referral has been placed.

## 2017-03-31 NOTE — Clinical Note
March 31, 2017         Patient: Mallorie Xie   YOB: 1955   Date of Visit: 3/31/2017           To Whom it May Concern:    Mallorie Xie was seen in my clinic on 3/31/2017. She may return to work on 04/10/17.    If you have any questions or concerns, please don't hesitate to call.        Sincerely,           CAMERON Edmonds.  Electronically Signed

## 2017-04-06 ENCOUNTER — HOSPITAL ENCOUNTER (OUTPATIENT)
Dept: RADIOLOGY | Facility: MEDICAL CENTER | Age: 62
End: 2017-04-06
Attending: NURSE PRACTITIONER
Payer: COMMERCIAL

## 2017-04-06 DIAGNOSIS — R05.9 COUGH: ICD-10-CM

## 2017-04-06 DIAGNOSIS — R93.89 ABNORMAL CHEST X-RAY: ICD-10-CM

## 2017-04-06 PROCEDURE — 700117 HCHG RX CONTRAST REV CODE 255: Performed by: NURSE PRACTITIONER

## 2017-04-06 PROCEDURE — 71260 CT THORAX DX C+: CPT

## 2017-04-06 RX ADMIN — IOHEXOL 75 ML: 350 INJECTION, SOLUTION INTRAVENOUS at 09:17

## 2017-04-07 ENCOUNTER — OFFICE VISIT (OUTPATIENT)
Dept: MEDICAL GROUP | Facility: PHYSICIAN GROUP | Age: 62
End: 2017-04-07
Payer: COMMERCIAL

## 2017-04-07 ENCOUNTER — HOSPITAL ENCOUNTER (OUTPATIENT)
Dept: LAB | Facility: MEDICAL CENTER | Age: 62
End: 2017-04-07
Attending: NURSE PRACTITIONER
Payer: COMMERCIAL

## 2017-04-07 VITALS
OXYGEN SATURATION: 93 % | WEIGHT: 252 LBS | HEART RATE: 76 BPM | DIASTOLIC BLOOD PRESSURE: 80 MMHG | HEIGHT: 63 IN | BODY MASS INDEX: 44.65 KG/M2 | SYSTOLIC BLOOD PRESSURE: 124 MMHG | TEMPERATURE: 97.8 F | RESPIRATION RATE: 20 BRPM

## 2017-04-07 DIAGNOSIS — E04.1 THYROID NODULE: ICD-10-CM

## 2017-04-07 DIAGNOSIS — R09.02 HYPOXIA: ICD-10-CM

## 2017-04-07 DIAGNOSIS — H53.9 VISION CHANGES: ICD-10-CM

## 2017-04-07 DIAGNOSIS — R06.02 SHORTNESS OF BREATH: ICD-10-CM

## 2017-04-07 DIAGNOSIS — R79.89 ABNORMAL TSH: ICD-10-CM

## 2017-04-07 DIAGNOSIS — J43.9 PULMONARY EMPHYSEMA, UNSPECIFIED EMPHYSEMA TYPE (HCC): ICD-10-CM

## 2017-04-07 LAB
EST. AVERAGE GLUCOSE BLD GHB EST-MCNC: 126 MG/DL
HBA1C MFR BLD: 6 % (ref 0–5.6)
T4 FREE SERPL-MCNC: 0.95 NG/DL (ref 0.53–1.43)

## 2017-04-07 PROCEDURE — 99214 OFFICE O/P EST MOD 30 MIN: CPT | Performed by: NURSE PRACTITIONER

## 2017-04-07 PROCEDURE — 83036 HEMOGLOBIN GLYCOSYLATED A1C: CPT

## 2017-04-07 PROCEDURE — 36415 COLL VENOUS BLD VENIPUNCTURE: CPT

## 2017-04-07 PROCEDURE — 84439 ASSAY OF FREE THYROXINE: CPT

## 2017-04-07 RX ORDER — LEVOTHYROXINE SODIUM 0.05 MG/1
50 TABLET ORAL
Qty: 30 TAB | Refills: 2 | Status: SHIPPED | OUTPATIENT
Start: 2017-04-07 | End: 2017-06-19 | Stop reason: SDUPTHER

## 2017-04-07 RX ORDER — TIOTROPIUM BROMIDE 18 UG/1
18 CAPSULE ORAL; RESPIRATORY (INHALATION) DAILY
Qty: 30 CAP | Refills: 3 | Status: SHIPPED | OUTPATIENT
Start: 2017-04-07 | End: 2017-06-19

## 2017-04-07 NOTE — MR AVS SNAPSHOT
"        Mallorie Xie   2017 11:00 AM   Office Visit   MRN: [id number]    Department:  [facility]   Dept Phone:  [phone]    Description:  Female : 1955   Provider:  KRISTAL Edmonds           Reason for Visit     Results fv labs, SOB      Allergies as of 2017     Allergen Noted Reactions    Bee Venom 2017   Anaphylaxis      You were diagnosed with     Shortness of breath   [786.05.ICD-9-CM]       Hypoxia   [731409]       Pulmonary emphysema, unspecified emphysema type (CMS-[facility])   [2200761]       Thyroid nodule   [264955]       Abnormal TSH   [079960]       Vision changes   [786888]         Vital Signs     Blood Pressure Pulse Temperature Respirations Height Weight    124/80 mmHg 76 36.6 °C (97.8 °F) 20 1.6 m (5' 2.99\") 114.306 kg (252 lb)    Body Mass Index Oxygen Saturation Smoking Status             44.65 kg/m2 93% Former Smoker         Basic Information     Date Of Birth Sex Race Ethnicity Preferred Language    1955 Female White Non- English      Your appointments     May 02, 2017  1:00 PM   Established Patient with KRISTAL Edmonds   [facility])    [facility] [facility] [address] [phone]           You will be receiving a confirmation call a few days before your appointment from our automated call confirmation system.              Problem List              ICD-10-CM Priority Class Noted - Resolved    Tobacco abuse Z72.0   Unknown - Present    Radiculopathy of arm M54.10   2012 - Present    Upper extremity pain, diffuse M79.603   2012 - Present    Left shoulder pain M25.512   2014 - Present    Upper back pain on left side M54.9   2014 - Present    Essential hypertension I10   3/31/2017 - Present    Morbid obesity with BMI of 45.0-49.9, adult (HCC) E66.01, Z68.42   3/31/2017 - Present    Former cigarette smoker Z87.891   3/31/2017 - Present    Cough R05   3/31/2017 - Present   " Shortness of breath R06.02   3/31/2017 - Present    Thyroid nodule E04.1   4/7/2017 - Present    Pulmonary emphysema (CMS-HCC) J43.9   4/7/2017 - Present    Vision changes H53.9   4/7/2017 - Present    Hypoxia R09.02   4/7/2017 - Present    Abnormal TSH R79.89   4/7/2017 - Present      Health Maintenance        Date Due Completion Dates    IMM DTaP/Tdap/Td Vaccine (1 - Tdap) 5/14/1972 ---    IMM PNEUMOCOCCAL 19-64 (ADULT) MEDIUM RISK SERIES (1 of 1 - PPSV23) 5/14/1972 ---    PAP SMEAR 5/14/1974 ---    MAMMOGRAM 5/14/1993 ---    COLONOSCOPY 5/14/2003 ---    IMM ZOSTER VACCINE 5/14/2013 ---            Current Immunizations     No immunizations on file.      Below and/or attached are the medications your provider expects you to take. Review all of your home medications and newly ordered medications with your provider and/or pharmacist. Follow medication instructions as directed by your provider and/or pharmacist. Please keep your medication list with you and share with your provider. Update the information when medications are discontinued, doses are changed, or new medications (including over-the-counter products) are added; and carry medication information at all times in the event of emergency situations     Allergies:  BEE VENOM - Anaphylaxis               Medications  Valid as of: April 07, 2017 -  4:36 PM    Generic Name Brand Name Tablet Size Instructions for use    Albuterol Sulfate (Aero Soln) albuterol 108 (90 BASE) MCG/ACT Inhale 2 Puffs by mouth every 6 hours as needed for Shortness of Breath.        Azithromycin (Tab) ZITHROMAX 250 MG         Fluticasone-Salmeterol (AEROSOL POWDER, BREATH ACTIVATED) ADVAIR 250-50 MCG/DOSE Inhale 1 Puff by mouth every 12 hours.        Levothyroxine Sodium (Tab) SYNTHROID 50 MCG Take 1 Tab by mouth Every morning on an empty stomach.        Lisinopril-Hydrochlorothiazide (Tab) PRINZIDE, ZESTORETIC 10-12.5 MG Take 1 Tab by mouth every day.        PredniSONE (Tab) DELTASONE 20  MG Take 20 mg by mouth every day.        Tiotropium Bromide Monohydrate (Cap) SPIRIVA 18 MCG Inhale 1 Cap by mouth every day.        .                 Medicines prescribed today were sent to:     [facility] [address] [place], [place] [address], [place] - [address] [facility]    [address] [facility] [place] [address]    Phone: [phone] Fax: [phone]    Open 24 Hours?: No      Medication refill instructions:       If your prescription bottle indicates you have medication refills left, it is not necessary to call your provider’s office. Please contact your pharmacy and they will refill your medication.    If your prescription bottle indicates you do not have any refills left, you may request refills at any time through one of the following ways: The online [employer] system (except Urgent Care), by calling your provider’s office, or by asking your pharmacy to contact your provider’s office with a refill request. Medication refills are processed only during regular business hours and may not be available until the next business day. Your provider may request additional information or to have a follow-up visit with you prior to refilling your medication.   *Please Note: Medication refills are assigned a new Rx number when refilled electronically. Your pharmacy may indicate that no refills were authorized even though a new prescription for the same medication is available at the pharmacy. Please request the medicine by name with the pharmacy before contacting your provider for a refill.        Your To Do List     Future Labs/Procedures Complete By Expires    HEMOGLOBIN A1C  As directed 4/7/2018    THYROID PANEL  As directed 4/7/2018    US-SOFT TISSUES OF HEAD - NECK  As directed 4/7/2018      Referral     A referral request has been sent to our patient care coordination department. Please allow 3-5 business days for us to process this request and contact you either by phone or mail. If you do not hear from us by the 5th business  day, please call us at [phone].        Instructions    Need to start on Oxygen    Follow up with 2 weeks    Labs done today    Referral to pulmonology    Thyroid ultrasound          MyChart Access Code: Activation code not generated  Current MyCInvaciot Status: Active

## 2017-04-07 NOTE — ASSESSMENT & PLAN NOTE
Patient here for follow-up visit, she was seen last week for shortness of breath and cough. She was seen in the urgent care a week prior for cough and upper respiratory illness and treated with antibiotics as well as oral steroids. When I saw her last week, her in office O2 saturations were 88%, but that improved after using her albuterol inhaler. Her sats were up to 93-94% on room air. Today she states that overall she feels better, her cough is much improved and she does not feel as short of breath as last week, but her O2 saturations continued to be less than 89%. She did use her albuterol inhaler while she was here, saturations increased to 93-94% but only lasted for a few minutes and she dropped back down to 88%. At her last visit I did start her on Advair, as was to be using conjunction with her albuterol. She states she does not like her feel.   We also discussed the findings of the CT scan of her chest. This was ordered due to the abnormal findings of the chest x-ray that was ordered by urgent care provider. Findings are as follows:    1.  There is no evidence of hilar mass or adenopathy.  2.  There are changes of upper lung zone emphysema.  3.  Mildly dilated main pulmonary outflow tract and pulmonary arteries which likely accounted for the radial graphic findings suggesting pulmonary arterial hypertension.  4.  There is atherosclerosis with coronary artery calcification.  5.  Slightly heterogeneous nodular right lobe thyroid gland.    I discussed with her I would like her to send her to pulmonologist, referral was placed. I discussed the importance of her being on oxygen until she can be seen by pulmonologist, this was also ordered. We will have her try Spiriva, this was called to her pharmacy. She will also need a thyroid ultrasound (see additional notes). Again, she was reminded to go to the emergency room for worsening of her respiratory symptoms to include severe shortness of breath, difficulty  breathing and chest pain. Patient was agreeable to plan.

## 2017-04-07 NOTE — ASSESSMENT & PLAN NOTE
See additional notes. Patient is a former smoker, quit recently--October 2016. At her last visit with me one week ago, she was referred to the lung cancer screening program. She was seen in the urgent care about 2 weeks ago and had abnormal chest x-ray findings in which a CT scan of the chest was recommended. CT scan of the chest showeda few scattered lucent areas in the upper lung zones consistent with underlying emphysema. She has been referred to pulmonology (see additional notes).

## 2017-04-07 NOTE — ASSESSMENT & PLAN NOTE
Patient reports recent visual changes, she has noticed that her vision has become very blurred over the last couple weeks. The glasses that she has now had worked for her for the last few years. She has not had her eyes checked for 2-3 years. I encouraged her to make an appointment to have her eyes checked. She is wondering about diabetes. She is morbidly obese, we will check a hemoglobin A1c.

## 2017-04-07 NOTE — PATIENT INSTRUCTIONS
Need to start on Oxygen    Follow up with 2 weeks    Labs done today    Referral to pulmonology    Thyroid ultrasound

## 2017-04-07 NOTE — PROGRESS NOTES
Chief Complaint   Patient presents with   • Results     fv labs, SOB         This is a 61 y.o.female patient that presents today with the following: Follow-up visit    Abnormal TSH  Patient has abnormal TSH with last labs drawn, 9.410. She does complain of symptoms of significant fatigue, sluggishness. Also, recent CT scan of the chest shows thyroid nodules. I have referred her for ultrasound of the thyroid as well as additional thyroid labs. We will start her on Synthroid, 50 µg daily. I instructed her to take this on an anti-stomach, first thing in the morning. I did discuss the risks, benefits and common side effects of this medication. We will recheck a TSH in 6 weeks.    Hypoxia  Patient here for follow-up visit, she was seen last week for shortness of breath and cough. She was seen in the urgent care a week prior for cough and upper respiratory illness and treated with antibiotics as well as oral steroids. When I saw her last week, her in office O2 saturations were 88%, but that improved after using her albuterol inhaler. Her sats were up to 93-94% on room air. Today she states that overall she feels better, her cough is much improved and she does not feel as short of breath as last week, but her O2 saturations continued to be less than 89%. She did use her albuterol inhaler while she was here, saturations increased to 93-94% but only lasted for a few minutes and she dropped back down to 88%. At her last visit I did start her on Advair, as was to be using conjunction with her albuterol. She states she does not like her feel.   We also discussed the findings of the CT scan of her chest. This was ordered due to the abnormal findings of the chest x-ray that was ordered by urgent care provider. Findings are as follows:    1.  There is no evidence of hilar mass or adenopathy.  2.  There are changes of upper lung zone emphysema.  3.  Mildly dilated main pulmonary outflow tract and pulmonary arteries which likely  accounted for the radial graphic findings suggesting pulmonary arterial hypertension.  4.  There is atherosclerosis with coronary artery calcification.  5.  Slightly heterogeneous nodular right lobe thyroid gland.    I discussed with her I would like her to send her to pulmonologist, referral was placed. I discussed the importance of her being on oxygen until she can be seen by pulmonologist, this was also ordered. We will have her try Spiriva, this was called to her pharmacy. She will also need a thyroid ultrasound (see additional notes). Again, she was reminded to go to the emergency room for worsening of her respiratory symptoms to include severe shortness of breath, difficulty breathing and chest pain. Patient was agreeable to plan.    Pulmonary emphysema (CMS-HCC)  See additional notes. Patient is a former smoker, quit recently--October 2016. At her last visit with me one week ago, she was referred to the lung cancer screening program. She was seen in the urgent care about 2 weeks ago and had abnormal chest x-ray findings in which a CT scan of the chest was recommended. CT scan of the chest showeda few scattered lucent areas in the upper lung zones consistent with underlying emphysema. She has been referred to pulmonology (see additional notes).    Shortness of breath  See additional notes    Thyroid nodule  This was an incidental finding on CT scan of the chest, multiple nodules seen on the right lobe of the thyroid. Ultrasound of the thyroid has been ordered in addition to other thyroid labs.    Vision changes  Patient reports recent visual changes, she has noticed that her vision has become very blurred over the last couple weeks. The glasses that she has now had worked for her for the last few years. She has not had her eyes checked for 2-3 years. I encouraged her to make an appointment to have her eyes checked. She is wondering about diabetes. She is morbidly obese, we will check a hemoglobin  A1c.      Hospital Outpatient Visit on 03/31/2017   Component Date Value   • B Natriuretic Peptide 03/31/2017 52    • TSH 03/31/2017 9.410*   • Cholesterol,Tot 03/31/2017 211*   • Triglycerides 03/31/2017 180*   • HDL 03/31/2017 68    • LDL 03/31/2017 107*   • Sodium 03/31/2017 138    • Potassium 03/31/2017 4.2    • Chloride 03/31/2017 98    • Co2 03/31/2017 35*   • Anion Gap 03/31/2017 5.0    • Glucose 03/31/2017 80    • Bun 03/31/2017 20    • Creatinine 03/31/2017 0.68    • Calcium 03/31/2017 9.7    • AST(SGOT) 03/31/2017 17    • ALT(SGPT) 03/31/2017 23    • Alkaline Phosphatase 03/31/2017 75    • Total Bilirubin 03/31/2017 0.8    • Albumin 03/31/2017 3.9    • Total Protein 03/31/2017 7.0    • Globulin 03/31/2017 3.1    • A-G Ratio 03/31/2017 1.3    • WBC 03/31/2017 9.7    • RBC 03/31/2017 5.58*   • Hemoglobin 03/31/2017 17.2*   • Hematocrit 03/31/2017 52.9*   • MCV 03/31/2017 94.8    • MCH 03/31/2017 30.8    • MCHC 03/31/2017 32.5*   • RDW 03/31/2017 49.1    • Platelet Count 03/31/2017 198    • MPV 03/31/2017 11.6    • Neutrophils-Polys 03/31/2017 67.90    • Lymphocytes 03/31/2017 22.60    • Monocytes 03/31/2017 7.20    • Eosinophils 03/31/2017 1.10    • Basophils 03/31/2017 0.70    • Immature Granulocytes 03/31/2017 0.50    • Nucleated RBC 03/31/2017 0.00    • Neutrophils (Absolute) 03/31/2017 6.55    • Lymphs (Absolute) 03/31/2017 2.18    • Monos (Absolute) 03/31/2017 0.70    • Eos (Absolute) 03/31/2017 0.11    • Baso (Absolute) 03/31/2017 0.07    • Immature Granulocytes (a* 03/31/2017 0.05    • NRBC (Absolute) 03/31/2017 0.00    • GFR If  03/31/2017 >60    • GFR If Non  Ameri* 03/31/2017 >60          clinical course has been stable    Past Medical History   Diagnosis Date   • Obesity    • Tobacco abuse    • Essential hypertension 3/31/2017       History reviewed. No pertinent past surgical history.    Family History   Problem Relation Age of Onset   • Arthritis Mother        Bee  "venom    Current Outpatient Prescriptions Ordered in [facility]   Medication Sig Dispense Refill   • tiotropium (SPIRIVA) 18 MCG Cap Inhale 1 Cap by mouth every day. 30 Cap 3   • levothyroxine (SYNTHROID) 50 MCG Tab Take 1 Tab by mouth Every morning on an empty stomach. 30 Tab 2   • fluticasone-salmeterol (ADVAIR) 250-50 MCG/DOSE AEROSOL POWDER, BREATH ACTIVATED Inhale 1 Puff by mouth every 12 hours. 1 Inhaler 1   • albuterol 108 (90 BASE) MCG/ACT Aero Soln inhalation aerosol Inhale 2 Puffs by mouth every 6 hours as needed for Shortness of Breath.     • lisinopril-hydrochlorothiazide (PRINZIDE, ZESTORETIC) 10-12.5 MG per tablet Take 1 Tab by mouth every day. 30 Tab 0   • azithromycin (ZITHROMAX) 250 MG Tab      • predniSONE (DELTASONE) 20 MG Tab Take 20 mg by mouth every day.       No current [facility]-ordered facility-administered medications on file.       Constitutional ROS: No unexplained fevers, sweats, or chills, Positive for weight loss, purposeful  Neck ROS: Positive for recent incidental finding of thyroid nodule  Pulmonary ROS: Positive per history of present illness  Cardiovascular ROS: Positive for hypertension, controlled  Gastrointestinal ROS: No abdominal pain, No nausea, vomiting, diarrhea, or constipation, no blood in stool  Musculoskeletal/Extremities ROS: No clubbing, No cyanosis, No pain, redness or swelling on the joints  Neurologic ROS: Normal development, No seizures, No weakness    Physical exam:  /80 mmHg  Pulse 76  Temp(Src) 36.6 °C (97.8 °F)  Resp 20  Ht 1.6 m (5' 2.99\")  Wt 114.306 kg (252 lb)  BMI 44.65 kg/m2  SpO2 93%  General Appearance: Older female, alert, no distress, morbidly obese, well-groomed  Skin: Skin color, texture, turgor normal. No rashes or lesions.  Neck: positive findings: thyroid - normal to inspection and palpation, unable to palpate nodule  Lungs: positive findings: Mildly decreased lung sounds throughout  Heart: negative. RRR without murmur, gallop, or rubs.  No " ectopy.  Abdomen: Abdomen non-tender. BS normal. No masses,  No organomegaly, positive findings:  Moderately distended  Musculoskeletal: negative findings: no evidence of joint effusion, no deformities present  Neurologic: intact, oriented, appropriate, judgment intact. Cranial nerves II-12 grossly intact     Medical decision making/discussion: Patient here for one-week follow-up status post urgent care visit for shortness of breath and cough. We will have her switch from Advair to Spiriva, she is to take this as directed. She has been referred to pulmonology. We will order oxygen for her. She was reminded to go to the emergency room for worsening of respiratory symptoms as discussed. We will get an ultrasound of thyroid. Additional labs thyroid have been ordered. We will check a hemoglobin A1c. She is to follow-up with me in 2 weeks.    Mallorie was seen today for results.    Diagnoses and all orders for this visit:    Shortness of breath  -     REFERRAL TO PULMONOLOGY  -     tiotropium (SPIRIVA) 18 MCG Cap; Inhale 1 Cap by mouth every day.    Hypoxia    Pulmonary emphysema, unspecified emphysema type (CMS-HCC)  -     REFERRAL TO PULMONOLOGY  -     tiotropium (SPIRIVA) 18 MCG Cap; Inhale 1 Cap by mouth every day.    Thyroid nodule  -     US-SOFT TISSUES OF HEAD - NECK; Future  -     THYROID PANEL; Future    Abnormal TSH  -     levothyroxine (SYNTHROID) 50 MCG Tab; Take 1 Tab by mouth Every morning on an empty stomach.    Vision changes  -     HEMOGLOBIN A1C; Future          Please note that this dictation was created using voice recognition software. I have made every reasonable attempt to correct obvious errors, but I expect that there are errors of grammar and possibly content that I did not discover before finalizing the note.

## 2017-04-07 NOTE — ASSESSMENT & PLAN NOTE
This was an incidental finding on CT scan of the chest, multiple nodules seen on the right lobe of the thyroid. Ultrasound of the thyroid has been ordered in addition to other thyroid labs.

## 2017-04-07 NOTE — ASSESSMENT & PLAN NOTE
Patient has abnormal TSH with last labs drawn, 9.410. She does complain of symptoms of significant fatigue, sluggishness. Also, recent CT scan of the chest shows thyroid nodules. I have referred her for ultrasound of the thyroid as well as additional thyroid labs. We will start her on Synthroid, 50 µg daily. I instructed her to take this on an anti-stomach, first thing in the morning. I did discuss the risks, benefits and common side effects of this medication. We will recheck a TSH in 6 weeks.

## 2017-04-10 ENCOUNTER — TELEPHONE (OUTPATIENT)
Dept: MEDICAL GROUP | Facility: PHYSICIAN GROUP | Age: 62
End: 2017-04-10

## 2017-04-10 NOTE — TELEPHONE ENCOUNTER
Can we try and get her in with me sooner than 05/02/17? I think the 24th or the 25th I only have 13 pts--I'm ok with opening a documentation time

## 2017-04-11 ENCOUNTER — TELEPHONE (OUTPATIENT)
Dept: MEDICAL GROUP | Facility: PHYSICIAN GROUP | Age: 62
End: 2017-04-11

## 2017-04-12 ASSESSMENT — ENCOUNTER SYMPTOMS
SHORTNESS OF BREATH: 1
WHEEZING: 1
HEMOPTYSIS: 0
DYSPNEA AT REST: 0
RESPIRATORY SYMPTOMS COMMENTS: YES
CHEST TIGHTNESS: 0

## 2017-04-17 ENCOUNTER — DOCUMENTATION (OUTPATIENT)
Dept: HEMATOLOGY ONCOLOGY | Facility: MEDICAL CENTER | Age: 62
End: 2017-04-17

## 2017-04-17 NOTE — PROGRESS NOTES
Received referral to lung cancer screening program.  Chart review to assess for lung cancer screening program eligibility.   1. Age 55-77 yrs of age? Yes 61 y.o.  2. 30 pack year hx of smoking, or greater? Yes 1 [id number]= 46 pkyr hx  3. Current smoker or if quit, has pt quit within last 15 yrs?Yes    4. Any signs or symptoms of lung cancer? No    5. Previous history of lung cancer? No  6. Chest CT within past 12 mos.? Yes, chest CT performed on 4/6/2017 due to abnormal chest xray findings.   Patient DOES NOT meet eligibility criteria. Provider notified by RN

## 2017-04-18 ENCOUNTER — OFFICE VISIT (OUTPATIENT)
Dept: PULMONOLOGY | Facility: HOSPICE | Age: 62
End: 2017-04-18
Payer: COMMERCIAL

## 2017-04-18 VITALS
DIASTOLIC BLOOD PRESSURE: 82 MMHG | TEMPERATURE: 97.7 F | HEIGHT: 63 IN | HEART RATE: 82 BPM | BODY MASS INDEX: 44.69 KG/M2 | WEIGHT: 252.2 LBS | SYSTOLIC BLOOD PRESSURE: 126 MMHG | OXYGEN SATURATION: 90 % | RESPIRATION RATE: 16 BRPM

## 2017-04-18 DIAGNOSIS — Z87.891 FORMER SMOKER: ICD-10-CM

## 2017-04-18 DIAGNOSIS — R09.02 HYPOXIA: ICD-10-CM

## 2017-04-18 DIAGNOSIS — J43.9 PULMONARY EMPHYSEMA, UNSPECIFIED EMPHYSEMA TYPE (HCC): ICD-10-CM

## 2017-04-18 DIAGNOSIS — Z87.891 FORMER CIGARETTE SMOKER: ICD-10-CM

## 2017-04-18 PROCEDURE — 99244 OFF/OP CNSLTJ NEW/EST MOD 40: CPT | Performed by: INTERNAL MEDICINE

## 2017-04-18 NOTE — MR AVS SNAPSHOT
"        Mallorie Xie   2017 9:00 AM   Office Visit   MRN: [id number]    Department:  [facility]   Dept Phone:  [phone]    Description:  Female : 1955   Provider:  Maren Daniels M.D.           Reason for Visit     Establish Care Referred by NOLA Bishop for pulmonary evaluation      Allergies as of 2017     Allergen Noted Reactions    Bee Venom 2017   Anaphylaxis      You were diagnosed with     Pulmonary emphysema, unspecified emphysema type (CMS-[facility])   [6837237]       Former cigarette smoker   [284072]       Former smoker   [[id number]]       Hypoxia   [218019]         Vital Signs     Blood Pressure Pulse Temperature Respirations Height Weight    126/82 mmHg 82 36.5 °C (97.7 °F) 16 1.6 m (5' 2.99\") 114.397 kg (252 lb 3.2 oz)    Body Mass Index Oxygen Saturation Smoking Status             44.69 kg/m2 90% Former Smoker         Basic Information     Date Of Birth Sex Race Ethnicity Preferred Language    1955 Female White Non- English      Your appointments     2017  5:00 PM   Established Patient with KRISTAL Edmonds   [facility]    [facility] [facility] [address] [phone]           You will be receiving a confirmation call a few days before your appointment from our automated call confirmation system.            2017  1:00 PM   US [id number] with [facility] US [facility]   [facility]    [address] [facility] [address] [phone]            May 30, 2017  2:00 PM   Pulmonary Function Test with PFT-RM3   [facility] (--)    [address]  [address]  [place] [id number] [phone]            May 30, 2017  3:00 PM   Pulmonary Function Test with SPIROMETRY/6MW   [facility] (--)    [address]  [address]  [place] [id number] [phone]            May 30, 2017  3:40 PM   Established Patient Pul with Maren Daniels M.D.   [facility] (--)    [address]  [address]  [place] [address] [phone]              Problem List              ICD-10-CM Priority Class Noted - Resolved    Tobacco abuse Z72.0   Unknown - Present    Radiculopathy of arm M54.10   12/4/2012 - Present    Upper extremity pain, diffuse M79.603   12/4/2012 - Present    Left shoulder pain M25.512   8/27/2014 - Present    Upper back pain on left side M54.9   8/27/2014 - Present    Essential hypertension I10   3/31/2017 - Present    Morbid obesity with BMI of 45.0-49.9, adult ([facility]) E66.01, Z68.42   3/31/2017 - Present    Former cigarette smoker Z87.891   3/31/2017 - Present    Cough R05   3/31/2017 - Present    Shortness of breath R06.02   3/31/2017 - Present    Thyroid nodule E04.1   4/7/2017 - Present    Pulmonary emphysema (CMS-[facility]) J43.9   4/7/2017 - Present    Vision changes H53.9   4/7/2017 - Present    Hypoxia R09.02   4/7/2017 - Present    Abnormal TSH R79.89   4/7/2017 - Present      Health Maintenance        Date Due Completion Dates    IMM DTaP/Tdap/Td Vaccine (1 - Tdap) 5/14/1972 ---    IMM PNEUMOCOCCAL 19-64 (ADULT) MEDIUM RISK SERIES (1 of 1 - PPSV23) 5/14/1972 ---    PAP SMEAR 5/14/1974 ---    MAMMOGRAM 5/14/1993 ---    COLONOSCOPY 5/14/2003 ---    IMM ZOSTER VACCINE 5/14/2013 ---            Current Immunizations     No immunizations on file.      Below and/or attached are the medications your provider expects you to take. Review all of your home medications and newly ordered medications with your provider and/or pharmacist. Follow medication instructions as directed by your provider and/or pharmacist. Please keep your medication list with you and share with your provider. Update the information when medications are discontinued, doses are changed, or new medications (including over-the-counter products) are added; and carry medication information at all times in the event of emergency situations     Allergies:  BEE VENOM - Anaphylaxis                  Medications  Valid as of: April 18, 2017 -  9:37 AM    Generic Name Brand Name Tablet Size Instructions for use    Albuterol Sulfate (Aero Soln) albuterol 108 (90 BASE) MCG/ACT Inhale 2 Puffs by mouth every 6 hours as needed for Shortness of Breath.        Azithromycin (Tab) ZITHROMAX 250 MG         Fluticasone-Salmeterol (AEROSOL POWDER, BREATH ACTIVATED) ADVAIR 250-50 MCG/DOSE Inhale 1 Puff by mouth every 12 hours.        Levothyroxine Sodium (Tab) SYNTHROID 50 MCG Take 1 Tab by mouth Every morning on an empty stomach.        Lisinopril-Hydrochlorothiazide (Tab) PRINZIDE, ZESTORETIC 10-12.5 MG Take 1 Tab by mouth every day.        PredniSONE (Tab) DELTASONE 20 MG Take 20 mg by mouth every day.        Tiotropium Bromide Monohydrate (Cap) SPIRIVA 18 MCG Inhale 1 Cap by mouth every day.        .                 Medicines prescribed today were sent to:     [address] [address]    Phone: [phone] Fax: [phone]    Open 24 Hours?: No      Medication refill instructions:       If your prescription bottle indicates you have medication refills left, it is not necessary to call your provider’s office. Please contact your pharmacy and they will refill your medication.    If your prescription bottle indicates you do not have any refills left, you may request refills at any time through one of the following ways: The online [facility] system (except Urgent Care), by calling your provider’s office, or by asking your pharmacy to contact your provider’s office with a refill request. Medication refills are processed only during regular business hours and may not be available until the next business day. Your provider may request additional information or to have a follow-up visit with you prior to refilling your medication.   *Please Note: Medication refills are assigned a new Rx number when refilled electronically. Your pharmacy  may indicate that no refills were authorized even though a new prescription for the same medication is available at the pharmacy. Please request the medicine by name with the pharmacy before contacting your provider for a refill.        Your To Do List     Future Labs/Procedures Complete By Expires    AMB PULMONARY FUNCTION TEST/LAB  As directed 4/18/2018    AMB PULMONARY STRESS TESTING (6 MIN WALK)  As directed 4/18/2018         [facility] Access Code: Activation code not generated  Current redealize Status: Active

## 2017-04-18 NOTE — PROGRESS NOTES
Chief Complaint   Patient presents with   • Establish Care     Referred by NOLA Bishop for pulmonary evaluation       HPI: This patient is a 61 y.o. Female who is referred for pulmonary evaluation for hypoxemia. She had been a smoker 47 pack years, quit in December 2016. In March 2017 she developed a URI requiring urgent care visit and was treated with antibiotics and oral steroids. She was noted to be hypoxic with oxygen saturations at 88% which improved after albuterol inhaler usage. She was prescribed Advair discus 250/50 and subsequently Spiriva, however is unsure of their benefit. She rarely uses her albuterol inhaler. She was prescribed oxygen however never had oxygen set up by the DME. She has returned to her normal baseline, and denies any further cough, wheezing, chest tightness or edema. She feels short of breath principally with climbing 2 flights of stairs which she requires to do at work. She had a chest CAT scan on April 6, 2017 showing upper lobe emphysema. She has a BMI of 44, has actively been working on losing weight and has noted improvement in symptoms with a 20 pound weight loss.  She states she has been in good health lifelong and denies any prior respiratory conditions, specifically asthma, bronchitis, or pneumonia.      Past Medical History   Diagnosis Date   • Obesity    • Tobacco abuse    • Essential hypertension 3/31/2017   • Chickenpox    • Mumps        Social History     Social History   • Marital Status:      Spouse Name: N/A   • Number of Children: N/A   • Years of Education: N/A     Occupational History   • Not on file.     Social History Main Topics   • Smoking status: Former Smoker -- 1.00 packs/day for 47 years     Types: Cigarettes     Quit date: 12/02/2016   • Smokeless tobacco: Never Used   • Alcohol Use: No   • Drug Use: No   • Sexual Activity: Not on file     Other Topics Concern   • Not on file     Social History Narrative       Family History   Problem  "Relation Age of Onset   • No Known Problems Mother    • No Known Problems Daughter        Current Outpatient Prescriptions on File Prior to Visit   Medication Sig Dispense Refill   • tiotropium (SPIRIVA) 18 MCG Cap Inhale 1 Cap by mouth every day. 30 Cap 3   • levothyroxine (SYNTHROID) 50 MCG Tab Take 1 Tab by mouth Every morning on an empty stomach. 30 Tab 2   • lisinopril-hydrochlorothiazide (PRINZIDE, ZESTORETIC) 10-12.5 MG per tablet Take 1 Tab by mouth every day. 30 Tab 0   • azithromycin (ZITHROMAX) 250 MG Tab      • fluticasone-salmeterol (ADVAIR) 250-50 MCG/DOSE AEROSOL POWDER, BREATH ACTIVATED Inhale 1 Puff by mouth every 12 hours. (Patient not taking: Reported on 4/18/2017) 1 Inhaler 1   • albuterol 108 (90 BASE) MCG/ACT Aero Soln inhalation aerosol Inhale 2 Puffs by mouth every 6 hours as needed for Shortness of Breath.     • predniSONE (DELTASONE) 20 MG Tab Take 20 mg by mouth every day.       No current facility-administered medications on file prior to visit.       Allergies: Bee venom    ROS:   Constitutional: Denies fevers, chills, night sweats, fatigue or unintentional weight loss  Eyes: Denies vision loss, pain, drainage, double vision  Ears, Nose, Throat: Denies earache, difficulty hearing, tinnitus, nasal congestion, hoarseness  Cardiovascular: Denies chest pain, tightness, palpitations, orthopnea or edema  Respiratory: As in history of present illness  Sleep: Denies daytime sleepiness, snoring, apneas, insomnia, morning headaches  GI: Denies heartburn, dysphagia, nausea, abdominal pain, diarrhea or constipation  : Denies frequent urination, hematuria, discharge or painful urination  Musculoskeletal: Denies back pain, painful joints, sore muscles  Neurological: Denies weakness or headaches  Skin: No rashes    Blood pressure 126/82, pulse 82, temperature 36.5 °C (97.7 °F), resp. rate 16, height 1.6 m (5' 2.99\"), weight 114.397 kg (252 lb 3.2 oz), SpO2 90 %.  Multi-Ox Readings  Multi Ox #1     O2 " sat % at rest     O2 sat % on exertion     O2 sat average on exertion     Multi Ox #2     O2 sat % at rest     O2 sat % on exertion     O2 sat average on exertion       Oxygen Use     Oxygen Frequency     Duration of need     Is the patient mobile within the home?     CPAP Use?     BIPAP Use?     Servo Titration         Physical Exam:  Appearance: Well-nourished, well-developed, in no acute distress  HEENT: Normocephalic, atraumatic, white sclera, PERRLA, oropharynx clear  Neck: No adenopathy or masses  Respiratory: no intercostal retractions or accessory muscle use  Lungs auscultation: Clear to auscultation bilaterally, diminished breath sounds  Cardiovascular: Regular rate rhythm. No murmurs, rubs or gallops.  No LE edema  Abdomen: soft, nondistended  Gait: Normal  Digits: No clubbing, cyanosis  Motor: No focal deficits  Orientation: Oriented to time, person and place    Diagnosis:  1. Pulmonary emphysema, unspecified emphysema type (CMS-HCC)  AMB PULMONARY FUNCTION TEST/LAB    AMB PULMONARY STRESS TESTING (6 MIN WALK)   2. Former cigarette smoker     3. Former smoker     4. Hypoxia         Plan:  The patient has a history of lifelong smoking, with evidence of upper lobe emphysema on chest CAT scan. She had an acute exacerbation of her COPD following URI which has now resolved. She quit smoking 4 months ago.  We will arrange for pulmonary function testing for staging of COPD, in addition to 6 minute walk test to determine any ongoing need for supplemental oxygen.   She has medically significant excessive weight which may also be a contributing factor towards her hypoxia (i.e. obesity hypoventilation). Weight loss was encouraged.  Complete and permanent smoking cessation was emphasized.  She will follow-up after PFTs and 6 minute walk test with final recommendations. If there is evidence of significant emphysema, then long-acting inhaled bronchodilators would be of benefit, and a LABA/LAMA would be advised.    Return for after testing.        Answers for HPI/ROS submitted by the patient on 4/12/2017   What is the reason for your visit today?: SOB  Have to stop when walking or walk at a slow pace? : December 2016, yes  Do you cough first thing in the morning or at other times during the day?: other times during the day  Do you have a cough on most days? If so, how long have you had this cough?: no  Bring up phlegm (mucus, sputum) in the morning or other times during the day?: no  If so, how long have you produced phlegm (Months, Years), and what is the usual color of your phlegm?: n/a  Do you cough up blood from your chest?: No  If so, how many times, and approximately how much?: 0  Do you experience wheezing?: Yes  How often?: rarely  Do you experience any chest tightness?: No  Experience shortness of breath?: Yes  Experience shortness of breath at rest?: No  How far can you walk before becoming short of breath or need to rest? : 200 feet  Please list what causes you to become short of breath:: exertion  Have you ever been hospitalized?: Yes  Reason, year, and hospital in which you were hospitalized:: 1980 & 1994 Childbirth  Have you ever needed to be intubated or placed on a ventilator? : No  Have you ever had problems with anesthesia?: No  Have you experienced post-operative delirium?: No  Any complications with surgery?: No  What year did you receive your last Flu shot?: never  What year did you receive you last Pneumonia shot?: never  Have you had a TB skin test? If so, please list the year and result:: yes, don't remember  Have you had Allergy skin testing? If so, please list the year and result:: no  Please list all your occupations from your first job to your current job. 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

## 2017-04-24 DIAGNOSIS — R60.9 EDEMA, UNSPECIFIED TYPE: ICD-10-CM

## 2017-04-24 RX ORDER — LISINOPRIL AND HYDROCHLOROTHIAZIDE 12.5; 1 MG/1; MG/1
1 TABLET ORAL DAILY
Qty: 30 TAB | Refills: 0 | Status: CANCELLED | OUTPATIENT
Start: 2017-04-24

## 2017-04-24 NOTE — TELEPHONE ENCOUNTER
Was the patient seen in the last year in this department? Yes     Does patient have an active prescription for medications requested? No     Received Request Via: Patient

## 2017-04-24 NOTE — TELEPHONE ENCOUNTER
From: Mallorie Xie  To: Misha Waters PA-C  Sent: 4/24/2017 11:28 AM PDT  Subject: Medication Renewal Request    Original authorizing provider: EDNA Morris would like a refill of the following medications:  lisinopril-hydrochlorothiazide (PRINZIDE, ZESTORETIC) 10-12.5 MG per tablet [Misha Waters PA-C]    Preferred pharmacy: [address] - [address] [place] [address]    Comment:

## 2017-04-25 ENCOUNTER — OFFICE VISIT (OUTPATIENT)
Dept: MEDICAL GROUP | Facility: PHYSICIAN GROUP | Age: 62
End: 2017-04-25
Payer: COMMERCIAL

## 2017-04-25 VITALS
DIASTOLIC BLOOD PRESSURE: 80 MMHG | SYSTOLIC BLOOD PRESSURE: 124 MMHG | WEIGHT: 250 LBS | HEIGHT: 63 IN | OXYGEN SATURATION: 92 % | RESPIRATION RATE: 20 BRPM | TEMPERATURE: 97.7 F | HEART RATE: 92 BPM | BODY MASS INDEX: 44.3 KG/M2

## 2017-04-25 DIAGNOSIS — E04.1 THYROID NODULE: ICD-10-CM

## 2017-04-25 DIAGNOSIS — R60.9 EDEMA, UNSPECIFIED TYPE: ICD-10-CM

## 2017-04-25 DIAGNOSIS — R09.02 HYPOXIA: ICD-10-CM

## 2017-04-25 PROCEDURE — 99214 OFFICE O/P EST MOD 30 MIN: CPT | Performed by: NURSE PRACTITIONER

## 2017-04-25 RX ORDER — LISINOPRIL AND HYDROCHLOROTHIAZIDE 12.5; 1 MG/1; MG/1
1 TABLET ORAL DAILY
Qty: 90 TAB | Refills: 1 | Status: SHIPPED | OUTPATIENT
Start: 2017-04-25 | End: 2017-09-07 | Stop reason: SDUPTHER

## 2017-04-25 NOTE — MR AVS SNAPSHOT
"Mallorie Xie   2017 5:00 PM   Office Visit   MRN: [id number]    Department:  [facility]   Dept Phone:  [phone]    Description:  Female : 1955   Provider:  KRISTAL Edmonds           Reason for Visit     Follow-Up           Allergies as of 2017     Allergen Noted Reactions    Bee Venom 2017   Anaphylaxis      You were diagnosed with     Thyroid nodule   [[id number]]         Vital Signs     Blood Pressure Pulse Temperature Respirations Height Weight    124/80 mmHg 92 36.5 °C (97.7 °F) 20 1.6 m (5' 2.99\") 113.399 kg (250 lb)    Body Mass Index Oxygen Saturation Smoking Status             44.30 kg/m2 92% Former Smoker         Basic Information     Date Of Birth Sex Race Ethnicity Preferred Language    1955 Female White Non- English      Your appointments     2017  1:00 PM   US [id number] with VISTA US 1   [facility]    [address] [facility] [place] [id number] [phone]            May 30, 2017  2:00 PM   Pulmonary Function Test with PFT-RM3   [facility] (--)    [address]  [address]  [place] [id number] [phone]            May 30, 2017  3:00 PM   Pulmonary Function Test with SPIROMETRY/6MW   [facility] (--)    [address]  [address]  [place] [address] [phone]            May 30, 2017  3:40 PM   Established Patient Pul with Maren Daniels M.D.   [facility] (--)    [address]  [address]  [place] [address] [phone]            2017  9:00 AM   Established Patient with KRISTAL Edmonds   [facility] ([facility]    [address] [facility] [place] [address] [phone]           You will be receiving a confirmation call a few days before your appointment from our automated call confirmation system.              Problem List              ICD-10-CM Priority Class Noted - Resolved    Tobacco abuse Z72.0   " Unknown - Present    Radiculopathy of arm M54.10   12/4/2012 - Present    Upper extremity pain, diffuse M79.603   12/4/2012 - Present    Left shoulder pain M25.512   8/27/2014 - Present    Upper back pain on left side M54.9   8/27/2014 - Present    Essential hypertension I10   3/31/2017 - Present    Morbid obesity with BMI of 45.0-49.9, adult ([facility]) E66.01, Z68.42   3/31/2017 - Present    Former cigarette smoker Z87.891   3/31/2017 - Present    Cough R05   3/31/2017 - Present    Shortness of breath R06.02   3/31/2017 - Present    Thyroid nodule E04.1   4/7/2017 - Present    Pulmonary emphysema (CMS-[facility]) J43.9   4/7/2017 - Present    Vision changes H53.9   4/7/2017 - Present    Hypoxia R09.02   4/7/2017 - Present    Abnormal TSH R79.89   4/7/2017 - Present      Health Maintenance        Date Due Completion Dates    IMM DTaP/Tdap/Td Vaccine (1 - Tdap) 5/14/1972 ---    IMM PNEUMOCOCCAL 19-64 (ADULT) MEDIUM RISK SERIES (1 of 1 - PPSV23) 5/14/1972 ---    PAP SMEAR 5/14/1974 ---    MAMMOGRAM 5/14/1993 ---    COLONOSCOPY 5/14/2003 ---    IMM ZOSTER VACCINE 5/14/2013 ---            Current Immunizations     No immunizations on file.      Below and/or attached are the medications your provider expects you to take. Review all of your home medications and newly ordered medications with your provider and/or pharmacist. Follow medication instructions as directed by your provider and/or pharmacist. Please keep your medication list with you and share with your provider. Update the information when medications are discontinued, doses are changed, or new medications (including over-the-counter products) are added; and carry medication information at all times in the event of emergency situations     Allergies:  BEE VENOM - Anaphylaxis               Medications  Valid as of: April 25, 2017 -  5:15 PM    Generic Name Brand Name Tablet Size Instructions for use    Albuterol Sulfate (Aero Soln) albuterol 108 (90 BASE) MCG/ACT Inhale 2  Puffs by mouth every 6 hours as needed for Shortness of Breath.        Levothyroxine Sodium (Tab) SYNTHROID 50 MCG Take 1 Tab by mouth Every morning on an empty stomach.        Lisinopril-Hydrochlorothiazide (Tab) PRINZIDE, ZESTORETIC 10-12.5 MG Take 1 Tab by mouth every day.        Tiotropium Bromide Monohydrate (Cap) SPIRIVA 18 MCG Inhale 1 Cap by mouth every day.        .                 Medicines prescribed today were sent to:     [address] [facility]    [address] [facility] [address]    Phone: [phone] Fax: [phone]    Open 24 Hours?: No      Medication refill instructions:       If your prescription bottle indicates you have medication refills left, it is not necessary to call your provider’s office. Please contact your pharmacy and they will refill your medication.    If your prescription bottle indicates you do not have any refills left, you may request refills at any time through one of the following ways: The online [employer] system (except Urgent Care), by calling your provider’s office, or by asking your pharmacy to contact your provider’s office with a refill request. Medication refills are processed only during regular business hours and may not be available until the next business day. Your provider may request additional information or to have a follow-up visit with you prior to refilling your medication.   *Please Note: Medication refills are assigned a new Rx number when refilled electronically. Your pharmacy may indicate that no refills were authorized even though a new prescription for the same medication is available at the pharmacy. Please request the medicine by name with the pharmacy before contacting your provider for a refill.        Instructions    2 months follow up    Will call when leave paperwork is ready    Keep all upcoming appts as scheduled    Continue on current medications          [employer] Access Code: Activation code  not generated  Current MyChart Status: Active

## 2017-04-25 NOTE — TELEPHONE ENCOUNTER
Kori- Pt originally started on med last month by Misha Waters in Urgent Care. Please refill as you see fit.

## 2017-04-26 ENCOUNTER — HOSPITAL ENCOUNTER (OUTPATIENT)
Dept: RADIOLOGY | Facility: MEDICAL CENTER | Age: 62
End: 2017-04-26
Attending: NURSE PRACTITIONER
Payer: COMMERCIAL

## 2017-04-26 DIAGNOSIS — E04.1 THYROID NODULE: ICD-10-CM

## 2017-04-26 PROCEDURE — 76536 US EXAM OF HEAD AND NECK: CPT

## 2017-04-26 NOTE — ASSESSMENT & PLAN NOTE
Patient is here for follow-up visit for shortness of breath and hypoxia. She was originally seen in urgent care for cough and upper respiratory illness that was treated with antibiotics as well as oral steroids. She has been referred to pulmonology and was seen last week on 18 April. She has an appointment for PFTs at the end of May. She states overall she feels better but her O2 saturations continued to dip down in to the high 80s on occasion and sometimes she will wake up in the middle of the night and find that her sats to be in the low 80's. Reports that all she has to do is take some deep breaths and her sats will come up into the low to mid 90's.   She continues on Spiriva, but she states she does not feel that this really does much for her. She did buy a home O2 saturation monitor and keeps a close eye on this. At her last appointment with me we did order oxygen, but she has had difficulty with the [employer] company not coming out to set this up for her. We have refaxed order to them. She states that she is not sure that she needs this because overall she feels better, she denies chest tightness, cough and any significant shortness of breath other than when she goes up and down stairs. I encouraged her to at least use this at night, especially if her sats dropped into the low 80s during the night. Patient agreed. I also encouraged her to continue on the Spiriva until she has her PFTs done.

## 2017-04-26 NOTE — PROGRESS NOTES
Chief Complaint   Patient presents with   • Follow-Up         This is a 61 y.o.female patient that presents today with the following: Follow-up visit    Thyroid nodule  Patient had incidental finding of thyroid nodule on CT scan and has an appointment tomorrow for thyroid ultrasound. I discussed that I will call her with results and further actions if needed. She states she is doing well on the Synthroid 50 µg daily. She will be due for follow-up TSH in about 4 weeks.    Hypoxia  Patient is here for follow-up visit for shortness of breath and hypoxia. She was originally seen in urgent care for cough and upper respiratory illness that was treated with antibiotics as well as oral steroids. She has been referred to pulmonology and was seen last week on 18 April. She has an appointment for PFTs at the end of May. She states overall she feels better but her O2 saturations continued to dip down in to the high 80s on occasion and sometimes she will wake up in the middle of the night and find that her sats to be in the low 80's. Reports that all she has to do is take some deep breaths and her sats will come up into the low to mid 90's.   She continues on Spiriva, but she states she does not feel that this really does much for her. She did buy a home O2 saturation monitor and keeps a close eye on this. At her last appointment with me we did order oxygen, but she has had difficulty with the [employer] company not coming out to set this up for her. We have refaxed order to them. She states that she is not sure that she needs this because overall she feels better, she denies chest tightness, cough and any significant shortness of breath other than when she goes up and down stairs. I encouraged her to at least use this at night, especially if her sats dropped into the low 80s during the night. Patient agreed. I also encouraged her to continue on the Spiriva until she has her PFTs done.      Hospital Outpatient Visit on 04/07/2017    Component Date Value   • Glycohemoglobin 04/07/2017 6.0*   • Est Avg Glucose 04/07/2017 126    • Free T-4 04/07/2017 0.95    Hospital Outpatient Visit on 03/31/2017   Component Date Value   • B Natriuretic Peptide 03/31/2017 52    • TSH 03/31/2017 9.410*   • Cholesterol,Tot 03/31/2017 211*   • Triglycerides 03/31/2017 180*   • HDL 03/31/2017 68    • LDL 03/31/2017 107*   • Sodium 03/31/2017 138    • Potassium 03/31/2017 4.2    • Chloride 03/31/2017 98    • Co2 03/31/2017 35*   • Anion Gap 03/31/2017 5.0    • Glucose 03/31/2017 80    • Bun 03/31/2017 20    • Creatinine 03/31/2017 0.68    • Calcium 03/31/2017 9.7    • AST(SGOT) 03/31/2017 17    • ALT(SGPT) 03/31/2017 23    • Alkaline Phosphatase 03/31/2017 75    • Total Bilirubin 03/31/2017 0.8    • Albumin 03/31/2017 3.9    • Total Protein 03/31/2017 7.0    • Globulin 03/31/2017 3.1    • A-G Ratio 03/31/2017 1.3    • WBC 03/31/2017 9.7    • RBC 03/31/2017 5.58*   • Hemoglobin 03/31/2017 17.2*   • Hematocrit 03/31/2017 52.9*   • MCV 03/31/2017 94.8    • MCH 03/31/2017 30.8    • MCHC 03/31/2017 32.5*   • RDW 03/31/2017 49.1    • Platelet Count 03/31/2017 198    • MPV 03/31/2017 11.6    • Neutrophils-Polys 03/31/2017 67.90    • Lymphocytes 03/31/2017 22.60    • Monocytes 03/31/2017 7.20    • Eosinophils 03/31/2017 1.10    • Basophils 03/31/2017 0.70    • Immature Granulocytes 03/31/2017 0.50    • Nucleated RBC 03/31/2017 0.00    • Neutrophils (Absolute) 03/31/2017 6.55    • Lymphs (Absolute) 03/31/2017 2.18    • Monos (Absolute) 03/31/2017 0.70    • Eos (Absolute) 03/31/2017 0.11    • Baso (Absolute) 03/31/2017 0.07    • Immature Granulocytes (a* 03/31/2017 0.05    • NRBC (Absolute) 03/31/2017 0.00    • GFR If  03/31/2017 >60    • GFR If Non  Ameri* 03/31/2017 >60          clinical course has been stable    Past Medical History   Diagnosis Date   • Obesity    • Tobacco abuse    • Essential hypertension 3/31/2017   • Chickenpox    • Mumps   "      Past Surgical History   Procedure Laterality Date   • Colonoscopy         Family History   Problem Relation Age of Onset   • No Known Problems Mother    • No Known Problems Daughter        Bee venom    Current Outpatient Prescriptions Ordered in [facility]   Medication Sig Dispense Refill   • lisinopril-hydrochlorothiazide (PRINZIDE, ZESTORETIC) 10-12.5 MG per tablet Take 1 Tab by mouth every day. 90 Tab 1   • tiotropium (SPIRIVA) 18 MCG Cap Inhale 1 Cap by mouth every day. 30 Cap 3   • levothyroxine (SYNTHROID) 50 MCG Tab Take 1 Tab by mouth Every morning on an empty stomach. 30 Tab 2   • albuterol 108 (90 BASE) MCG/ACT Aero Soln inhalation aerosol Inhale 2 Puffs by mouth every 6 hours as needed for Shortness of Breath.       No current [facility]-ordered facility-administered medications on file.       Constitutional ROS: No unexpected change in weight, No weakness, No unexplained fevers, sweats, or chills  Pulmonary ROS: Positive for history of present illness  Cardiovascular ROS: No chest pain, No edema, No palpitations, Positive for hypertension, controlled  Gastrointestinal ROS: No abdominal pain, No nausea, vomiting, diarrhea, or constipation, no blood in stool  Musculoskeletal/Extremities ROS: No clubbing, No cyanosis, No pain, redness or swelling on the joints  Neurologic ROS: Normal development, No seizures, No weakness  Endocrine ROS: Positive per history of present illness    Physical exam:  /80 mmHg  Pulse 92  Temp(Src) 36.5 °C (97.7 °F)  Resp 20  Ht 1.6 m (5' 2.99\")  Wt 113.399 kg (250 lb)  BMI 44.30 kg/m2  SpO2 92%  General Appearance: Older female, alert, no distress, morbidly obese, well-groomed  Skin: Skin color, texture, turgor normal. No rashes or lesions.  Lungs: negative findings: normal respiratory rate and rhythm, lungs clear to auscultation  Heart: negative. RRR without murmur, gallop, or rubs.  No ectopy.  Abdomen: Abdomen soft, non-tender. BS normal. No masses,  No " organomegaly  Musculoskeletal: negative findings: no erythema, induration, or nodules, no evidence of joint effusion, no deformities present  Neurologic: intact, oriented, mood appropriate, judgment intact. Cranial nerves II through XII grossly intact    Medical decision making/discussion: Patient here for follow-up visit. She has since been seen by pulmonology and has appointment at the end of May for PFTs. She is to continue with Spiriva as previously prescribed. I have encouraged her to use oxygen as prescribed at least during the night especially if her oxygen saturation drops down into the low 80s. We will need to consider doing a overnight pulse oximetry as well as sleep study. I will call her with results for thyroid ultrasound and further actions if needed. She is to follow-up with me in 2 months, sooner if needed. At her follow-up appointment we will discuss her other healthcare maintenance exams that are needed.    Mallorie was seen today for follow-up.    Diagnoses and all orders for this visit:    Thyroid nodule    Hypoxia          Please note that this dictation was created using voice recognition software. I have made every reasonable attempt to correct obvious errors, but I expect that there are errors of grammar and possibly content that I did not discover before finalizing the note.

## 2017-04-26 NOTE — ASSESSMENT & PLAN NOTE
Patient had incidental finding of thyroid nodule on CT scan and has an appointment tomorrow for thyroid ultrasound. I discussed that I will call her with results and further actions if needed. She states she is doing well on the Synthroid 50 µg daily. She will be due for follow-up TSH in about 4 weeks.

## 2017-04-26 NOTE — PATIENT INSTRUCTIONS
2 months follow up    Will call when leave paperwork is ready    Keep all upcoming appts as scheduled    Continue on current medications

## 2017-05-15 DIAGNOSIS — R09.02 HYPOXIA: ICD-10-CM

## 2017-05-30 ENCOUNTER — OFFICE VISIT (OUTPATIENT)
Dept: PULMONOLOGY | Facility: HOSPICE | Age: 62
End: 2017-05-30
Payer: COMMERCIAL

## 2017-05-30 ENCOUNTER — NON-PROVIDER VISIT (OUTPATIENT)
Dept: PULMONOLOGY | Facility: HOSPICE | Age: 62
End: 2017-05-30
Payer: COMMERCIAL

## 2017-05-30 ENCOUNTER — NON-PROVIDER VISIT (OUTPATIENT)
Dept: PULMONOLOGY | Facility: HOSPICE | Age: 62
End: 2017-05-30
Attending: INTERNAL MEDICINE
Payer: COMMERCIAL

## 2017-05-30 ENCOUNTER — HOSPITAL ENCOUNTER (OUTPATIENT)
Dept: LAB | Facility: MEDICAL CENTER | Age: 62
End: 2017-05-30
Attending: NURSE PRACTITIONER
Payer: COMMERCIAL

## 2017-05-30 VITALS
OXYGEN SATURATION: 96 % | BODY MASS INDEX: 44.3 KG/M2 | SYSTOLIC BLOOD PRESSURE: 132 MMHG | WEIGHT: 250 LBS | DIASTOLIC BLOOD PRESSURE: 80 MMHG | HEIGHT: 63 IN | TEMPERATURE: 97.5 F | HEART RATE: 81 BPM

## 2017-05-30 VITALS — BODY MASS INDEX: 46.95 KG/M2 | WEIGHT: 265 LBS | HEIGHT: 63 IN

## 2017-05-30 DIAGNOSIS — Z87.891 FORMER CIGARETTE SMOKER: ICD-10-CM

## 2017-05-30 DIAGNOSIS — J45.909 UNCOMPLICATED ASTHMA, UNSPECIFIED ASTHMA SEVERITY: ICD-10-CM

## 2017-05-30 DIAGNOSIS — J43.9 PULMONARY EMPHYSEMA, UNSPECIFIED EMPHYSEMA TYPE (HCC): ICD-10-CM

## 2017-05-30 DIAGNOSIS — E03.9 HYPOTHYROIDISM, UNSPECIFIED TYPE: ICD-10-CM

## 2017-05-30 DIAGNOSIS — E04.1 THYROID NODULE: ICD-10-CM

## 2017-05-30 DIAGNOSIS — E66.01 MORBID OBESITY WITH BMI OF 45.0-49.9, ADULT (HCC): ICD-10-CM

## 2017-05-30 DIAGNOSIS — G47.33 OSA (OBSTRUCTIVE SLEEP APNEA): ICD-10-CM

## 2017-05-30 LAB — TSH SERPL DL<=0.005 MIU/L-ACNC: 2.8 UIU/ML (ref 0.3–3.7)

## 2017-05-30 PROCEDURE — 99214 OFFICE O/P EST MOD 30 MIN: CPT | Performed by: INTERNAL MEDICINE

## 2017-05-30 PROCEDURE — 94620 AMB PULMONARY STRESS TESTING (6 MIN WALK): CPT | Performed by: INTERNAL MEDICINE

## 2017-05-30 PROCEDURE — 36415 COLL VENOUS BLD VENIPUNCTURE: CPT

## 2017-05-30 PROCEDURE — 94060 EVALUATION OF WHEEZING: CPT | Performed by: INTERNAL MEDICINE

## 2017-05-30 PROCEDURE — 94726 PLETHYSMOGRAPHY LUNG VOLUMES: CPT | Performed by: INTERNAL MEDICINE

## 2017-05-30 PROCEDURE — 94729 DIFFUSING CAPACITY: CPT | Performed by: INTERNAL MEDICINE

## 2017-05-30 PROCEDURE — 84443 ASSAY THYROID STIM HORMONE: CPT

## 2017-05-30 RX ORDER — ZOLPIDEM TARTRATE 5 MG/1
5 TABLET ORAL NIGHTLY PRN
Qty: 3 TAB | Refills: 0 | Status: SHIPPED | OUTPATIENT
Start: 2017-05-30 | End: 2017-08-11

## 2017-05-30 RX ORDER — BUDESONIDE AND FORMOTEROL FUMARATE DIHYDRATE 160; 4.5 UG/1; UG/1
2 AEROSOL RESPIRATORY (INHALATION) 2 TIMES DAILY
Qty: 1 INHALER | Refills: 6 | Status: SHIPPED | OUTPATIENT
Start: 2017-05-30 | End: 2018-01-04 | Stop reason: SDUPTHER

## 2017-05-30 RX ORDER — IRBESARTAN 150 MG/1
150 TABLET ORAL NIGHTLY
COMMUNITY
End: 2017-08-11

## 2017-05-30 RX ORDER — PREDNISONE 20 MG/1
TABLET ORAL
COMMUNITY
Start: 2017-03-27 | End: 2017-06-19

## 2017-05-30 RX ORDER — ATENOLOL 100 MG/1
100 TABLET ORAL DAILY
COMMUNITY
End: 2017-08-11

## 2017-05-30 ASSESSMENT — 6 MINUTE WALK TEST (6MWT)
HEART RATE AT 2 MIN: 80
PERCEIVED FATIGUE AT 6 MIN: 0
PERCEIVED BREATHLESSNESS AT 2 MIN: 1
PERCEIVED BREATHLESSNESS AT 1 MIN: 1
HEART RATE AT 2 MIN: 111
BLOOD PRESSURE AT 2 MIN: 132/80
PERCEIVED FATIGUE AT 2 MIN: 0
HEART RATE AT 4 MIN: 107
COMMENTS: ADDED 02 2 LPM
HEART RATE AT 1 MIN: 107
PERCEIVED BREATHLESSNESS AT 2 MIN: 2
SAO2 AT 5 MIN: 89
PERCEIVED BREATHLESSNESS AT 5 MIN: 1
BLOOD PRESSURE AT 1 MIN: 134/86
PERCEIVED FATIGUE AT 2 MIN: 0
PERCEIVED BREATHLESSNESS AT 4 MIN: 0.5
SAO2 AT 6 MIN: 86
HEART RATE AT 6 MIN: 102
SAO2 AT 2 MIN: 86
PERCEIVED BREATHLESSNESS AT 3 MIN: 0.5
HEART RATE AT 5 MIN: 111
PERCEIVED BREATHLESSNESS AT 6 MIN: 1
AMBULATES WITH O2: WITHOUT O2
HEART RATE AT 1 MIN: 115
PERCEIVED FATIGUE AT 3 MIN: 0
PERCEIVED BREATHLESSNESS AT 1 MIN: 2
SAO2 AT 1 MIN: 88
SAO2 AT 1 MIN: 93
NUMBER OF RESTS: 0
HEART RATE AT 3 MIN: 96
PERCEIVED FATIGUE AT 1 MIN: 0
PERCEIVED FATIGUE AT 4 MIN: 0
PERCENT OF NORMAL WALKED: 83
SAO2 AT 2 MIN: 95
O2 SAT PERCENT ROOM AIR: 92
SAO2 AT 3 MIN: 94
SITTING BLOOD PRESSURE: 132/80
BLOOD PRESSURE: RIGHT ARM
SAO2 AT 4 MIN: 91
PERCEIVED FATIGUE AT 5 MIN: 0
TOTAL REST TIME: 0
PERCEIVED FATIGUE AT 1 MIN: 0
HEART RATE: 86

## 2017-05-30 ASSESSMENT — PULMONARY FUNCTION TESTS
FEV1/FVC_PERCENT_PREDICTED: 102
FEV1/FVC_PERCENT_PREDICTED: 76
FEV1/FVC_PERCENT_CHANGE: 65
FEV1_PERCENT_CHANGE: 11
FVC_PERCENT_PREDICTED: 54
FEV1_PERCENT_CHANGE: 17
FEV1: 1.42
FEV1/FVC_PERCENT_PREDICTED: 97
FVC: 1.63
FEV1_PERCENT_PREDICTED: 55
FEV1_PREDICTED: 2.3
FEV1_PERCENT_PREDICTED: 61
FEV1/FVC: 74.35
FVC_PREDICTED: 3.01
FVC_PERCENT_PREDICTED: 63
FEV1: 1.27
FEV1/FVC: 78
FVC: 1.91

## 2017-05-30 NOTE — MR AVS SNAPSHOT
Mallorie Xie   2017 3:00 PM   Appointment   MRN: [id number]    Department:  [facility]   Dept Phone:  [phone]    Description:  Female : 1955   Provider:  SPIROMETRY/6MW           Allergies as of 2017     Allergen Noted Reactions    Bee Venom 2017   Anaphylaxis      You were diagnosed with     Pulmonary emphysema, unspecified emphysema type (CMS-[facility])   [6136268]         Vital Signs     Smoking Status                   Former Smoker           Basic Information     Date Of Birth Sex Race Ethnicity Preferred Language    1955 Female White Non- English      Your appointments     May 30, 2017  3:00 PM   Pulmonary Function Test with SPIROMETRY/6MW   [facility] (--)    [address]  [address]  [place] [address] [phone]            May 30, 2017  3:40 PM   Established Patient Pul with Maren Daniels M.D.   [facility] (--)    [address]  [address]  [place] [address] [phone]            2017  9:00 AM   Established Patient with KRISTAL Edmonds   [facility]    [address] [place] [address] [phone]           You will be receiving a confirmation call a few days before your appointment from our automated call confirmation system.            2017 10:00 AM   New Patient with C Rotation   [facility] (--)    [address]  [place] [address] [phone]           Please bring enclosed paperwork completed along with your insurance card and photo ID.              Problem List              ICD-10-CM Priority Class Noted - Resolved    Tobacco abuse Z72.0   Unknown - Present    Radiculopathy of arm M54.10   2012 - Present    Upper extremity pain, diffuse M79.603   2012 - Present    Left shoulder pain M25.512   2014 - Present    Upper back pain on left side M54.9   2014 - Present       Essential hypertension I10   3/31/2017 - Present    Morbid obesity with BMI of 45.0-49.9, adult ([facility]) E66.01, Z68.42   3/31/2017 - Present    Former cigarette smoker Z87.891   3/31/2017 - Present    Cough R05   3/31/2017 - Present    Shortness of breath R06.02   3/31/2017 - Present    Thyroid nodule E04.1   4/7/2017 - Present    Pulmonary emphysema (CMS-[facility]) J43.9   4/7/2017 - Present    Vision changes H53.9   4/7/2017 - Present    Hypoxia R09.02   4/7/2017 - Present    Abnormal TSH R79.89   4/7/2017 - Present      Health Maintenance        Date Due Completion Dates    IMM DTaP/Tdap/Td Vaccine (1 - Tdap) 5/14/1972 ---    IMM PNEUMOCOCCAL 19-64 (ADULT) MEDIUM RISK SERIES (1 of 1 - PPSV23) 5/14/1972 ---    PAP SMEAR 5/14/1974 ---    MAMMOGRAM 5/14/1993 ---    COLONOSCOPY 5/14/2003 ---    IMM ZOSTER VACCINE 5/14/2013 ---            Current Immunizations     No immunizations on file.      Below and/or attached are the medications your provider expects you to take. Review all of your home medications and newly ordered medications with your provider and/or pharmacist. Follow medication instructions as directed by your provider and/or pharmacist. Please keep your medication list with you and share with your provider. Update the information when medications are discontinued, doses are changed, or new medications (including over-the-counter products) are added; and carry medication information at all times in the event of emergency situations     Allergies:  BEE VENOM - Anaphylaxis               Medications  Valid as of: May 30, 2017 -  2:46 PM    Generic Name Brand Name Tablet Size Instructions for use    Albuterol Sulfate (Aero Soln) albuterol 108 (90 BASE) MCG/ACT Inhale 2 Puffs by mouth every 6 hours as needed for Shortness of Breath.        Levothyroxine Sodium (Tab) SYNTHROID 50 MCG Take 1 Tab by mouth Every morning on an empty stomach.        Lisinopril-Hydrochlorothiazide (Tab) PRINZIDE, ZESTORETIC 10-12.5 MG Take  1 Tab by mouth every day.        Tiotropium Bromide Monohydrate (Cap) SPIRIVA 18 MCG Inhale 1 Cap by mouth every day.        .                 Medicines prescribed today were sent to:     [facility] [place] - [address]    [address] [place] [address]    Phone: [phone] Fax: [phone]    Open 24 Hours?: No      Medication refill instructions:       If your prescription bottle indicates you have medication refills left, it is not necessary to call your provider’s office. Please contact your pharmacy and they will refill your medication.    If your prescription bottle indicates you do not have any refills left, you may request refills at any time through one of the following ways: The online [employer] system (except Urgent Care), by calling your provider’s office, or by asking your pharmacy to contact your provider’s office with a refill request. Medication refills are processed only during regular business hours and may not be available until the next business day. Your provider may request additional information or to have a follow-up visit with you prior to refilling your medication.   *Please Note: Medication refills are assigned a new Rx number when refilled electronically. Your pharmacy may indicate that no refills were authorized even though a new prescription for the same medication is available at the pharmacy. Please request the medicine by name with the pharmacy before contacting your provider for a refill.           [employer] Access Code: Activation code not generated  Current [employer] Status: Active

## 2017-05-30 NOTE — MR AVS SNAPSHOT
"        Mallorie Xie   2017 3:40 PM   Office Visit   MRN: [id number]    Department:  [facility]   Dept Phone:  [phone]    Description:  Female : 1955   Provider:  Maren Daniels M.D.           Reason for Visit     Results 6MW,CPFT results.      Allergies as of 2017     Allergen Noted Reactions    Bee Venom 2017   Anaphylaxis      You were diagnosed with     Pulmonary emphysema, unspecified emphysema type (CMS-HCC)   [2907995]       Uncomplicated asthma, unspecified asthma severity   [4869180]       Morbid obesity with BMI of 45.0-49.9, adult (CMS-HCC)   [018258]       Former cigarette smoker   [419335]       LUIS (obstructive sleep apnea)   [737525]         Vital Signs     Blood Pressure Pulse Temperature Height Weight Body Mass Index    132/80 mmHg 81 36.4 °C (97.5 °F) 1.588 m (5' 2.52\") 113.399 kg (250 lb) 44.97 kg/m2    Oxygen Saturation Smoking Status                96% Former Smoker          Basic Information     Date Of Birth Sex Race Ethnicity Preferred Language    1955 Female White Non- English      Your appointments     2017  9:00 AM   Established Patient with KRISTAL Edmonds   [address] [address] [phone]           You will be receiving a confirmation call a few days before your appointment from our automated call confirmation system.              Problem List              ICD-10-CM Priority Class Noted - Resolved    Tobacco abuse Z72.0   Unknown - Present    Radiculopathy of arm M54.10   2012 - Present    Upper extremity pain, diffuse M79.603   2012 - Present    Left shoulder pain M25.512   2014 - Present    Upper back pain on left side M54.9   2014 - Present    Essential hypertension I10   3/31/2017 - Present    Morbid obesity with BMI of 45.0-49.9, adult ([facility]) E66.01, Z68.42   3/31/2017 - Present    Former cigarette smoker Z87.891   3/31/2017 - " Present    Cough R05   3/31/2017 - Present    Shortness of breath R06.02   3/31/2017 - Present    Thyroid nodule E04.1   4/7/2017 - Present    Pulmonary emphysema (CMS-HCC) J43.9   4/7/2017 - Present    Vision changes H53.9   4/7/2017 - Present    Hypoxia R09.02   4/7/2017 - Present    Abnormal TSH R79.89   4/7/2017 - Present      Health Maintenance        Date Due Completion Dates    IMM DTaP/Tdap/Td Vaccine (1 - Tdap) 5/14/1972 ---    IMM PNEUMOCOCCAL 19-64 (ADULT) MEDIUM RISK SERIES (1 of 1 - PPSV23) 5/14/1972 ---    PAP SMEAR 5/14/1974 ---    MAMMOGRAM 5/14/1993 ---    COLONOSCOPY 5/14/2003 ---    IMM ZOSTER VACCINE 5/14/2013 ---            Current Immunizations     No immunizations on file.      Below and/or attached are the medications your provider expects you to take. Review all of your home medications and newly ordered medications with your provider and/or pharmacist. Follow medication instructions as directed by your provider and/or pharmacist. Please keep your medication list with you and share with your provider. Update the information when medications are discontinued, doses are changed, or new medications (including over-the-counter products) are added; and carry medication information at all times in the event of emergency situations     Allergies:  BEE VENOM - Anaphylaxis               Medications  Valid as of: May 30, 2017 -  3:41 PM    Generic Name Brand Name Tablet Size Instructions for use    Albuterol Sulfate (Aero Soln) albuterol 108 (90 BASE) MCG/ACT Inhale 2 Puffs by mouth every 6 hours as needed for Shortness of Breath.        Atenolol (Tab) TENORMIN 100 MG Take 100 mg by mouth every day.        Budesonide-Formoterol Fumarate (Aerosol) SYMBICORT 160-4.5 MCG/ACT Inhale 2 Puffs by mouth 2 Times a Day. Use spacer. Rinse mouth after each use.        Cholecalciferol (Tab) cholecalciferol 1000 UNIT Take 1,000 Units by mouth every day.        Fluticasone-Salmeterol (AEROSOL POWDER, BREATH  ACTIVATED) ADVAIR DISKUS 250-50 MCG/DOSE         [place]   Take 1 tablet by mouth every day.        Iodine (Kelp)   Take 1 tablet by mouth every day.        Irbesartan (Tab) AVAPRO 150 MG Take 150 mg by mouth every evening.        Levothyroxine Sodium (Tab) SYNTHROID 50 MCG Take 1 Tab by mouth Every morning on an empty stomach.        Lisinopril-Hydrochlorothiazide (Tab) PRINZIDE, ZESTORETIC 10-12.5 MG Take 1 Tab by mouth every day.        Melatonin   Take 1 tablet by mouth at bedtime as needed.        PredniSONE (Tab) DELTASONE 20 MG         Tiotropium Bromide Monohydrate (Cap) SPIRIVA 18 MCG Inhale 1 Cap by mouth every day.        Zolpidem Tartrate (Tab) AMBIEN 5 MG Take 1 Tab by mouth at bedtime as needed for Sleep (1 to 3 po qhs prn insomnia/sleep study. Bring to sleep study.).        .                 Medicines prescribed today were sent to:     [facility] [address] [place] [address]    Phone: [phone] Fax: [phone]    Open 24 Hours?: No      Medication refill instructions:       If your prescription bottle indicates you have medication refills left, it is not necessary to call your provider’s office. Please contact your pharmacy and they will refill your medication.    If your prescription bottle indicates you do not have any refills left, you may request refills at any time through one of the following ways: The online [employer] system (except Urgent Care), by calling your provider’s office, or by asking your pharmacy to contact your provider’s office with a refill request. Medication refills are processed only during regular business hours and may not be available until the next business day. Your provider may request additional information or to have a follow-up visit with you prior to refilling your medication.   *Please Note: Medication refills are assigned a new Rx number when refilled electronically. Your pharmacy may indicate  that no refills were authorized even though a new prescription for the same medication is available at the pharmacy. Please request the medicine by name with the pharmacy before contacting your provider for a refill.        Your To Do List     Future Labs/Procedures Complete By Expires    POLYSOMNOGRAPHY, 4 OR MORE  As directed 5/30/2018         [employer] Access Code: Activation code not generated  Current [employer] Status: Active

## 2017-05-30 NOTE — PROGRESS NOTES
Chief Complaint   Patient presents with   • Results     6MW,CPFT results.       HPI: This patient is a 62 y.o. Female who returns for hypoxemia. She had been a smoker 47 pack years, quit in December 2016. In March 2017 she developed a URI requiring urgent care visit and was treated with antibiotics and oral steroids. She was noted to be hypoxic with oxygen saturations at 88% which improved after albuterol use. She was prescribed Advair discus 250/50 briefly and subsequently Spiriva, however does not feel they are beneficial. She has returned to her baseline, and denies any further cough, wheezing, chest tightness or edema. She feels short of breath principally with climbing 2 flights of stairs which she requires to do at work. She purchased a pulse oximeter and has been monitoring her saturations which she states are consistently over 90%. Her 6 minute walk test today showed brief desaturations to a cecilio of 86% with rapid recovery. She walked a total distance of 1000 feet or 83% of normal. She had a chest CAT scan on April 6, 2017 showing upper lobe emphysema. Pulmonary function testing however show significant bronchodilator response showing a large asthmatic component with FEV1 1.42 L or 61%. Her DLCO is increased at 147%, also consistent with asthma.  She has a BMI of 44. She is unaware of snoring or apneas, however has been using oxygen nocturnally. She is frustrated that she cannot drop weight despite dietary and exercise modification. She is scheduled for a sleep study.       Past Medical History   Diagnosis Date   • Obesity    • Tobacco abuse    • Essential hypertension 3/31/2017   • Chickenpox    • Mumps        Social History     Social History   • Marital Status:      Spouse Name: N/A   • Number of Children: N/A   • Years of Education: N/A     Occupational History   • Not on file.     Social History Main Topics   • Smoking status: Former Smoker -- 1.00 packs/day for 47 years     Types: Cigarettes  "    Quit date: 12/02/2016   • Smokeless tobacco: Never Used   • Alcohol Use: No   • Drug Use: No   • Sexual Activity: Not on file     Other Topics Concern   • Not on file     Social History Narrative       Family History   Problem Relation Age of Onset   • No Known Problems Daughter    • Cancer Sister    • Cancer Sister        Current Outpatient Prescriptions on File Prior to Visit   Medication Sig Dispense Refill   • lisinopril-hydrochlorothiazide (PRINZIDE, ZESTORETIC) 10-12.5 MG per tablet Take 1 Tab by mouth every day. 90 Tab 1   • tiotropium (SPIRIVA) 18 MCG Cap Inhale 1 Cap by mouth every day. 30 Cap 3   • levothyroxine (SYNTHROID) 50 MCG Tab Take 1 Tab by mouth Every morning on an empty stomach. 30 Tab 2   • albuterol 108 (90 BASE) MCG/ACT Aero Soln inhalation aerosol Inhale 2 Puffs by mouth every 6 hours as needed for Shortness of Breath.       No current facility-administered medications on file prior to visit.       Allergies: Bee venom    ROS:   Constitutional: Denies fevers, chills, night sweats, fatigue or weight loss  Eyes: Denies vision loss, pain, drainage, double vision  Ears, Nose, Throat: Denies earache, difficulty hearing, tinnitus, nasal congestion, hoarseness  Cardiovascular: Denies chest pain, tightness, palpitations, orthopnea or edema  Respiratory: As in history of present illness  Sleep: Denies daytime sleepiness, snoring, apneas, insomnia, morning headaches  GI: Denies heartburn, dysphagia, nausea, abdominal pain, diarrhea or constipation  : Denies frequent urination, hematuria, discharge or painful urination  Musculoskeletal: Denies back pain, painful joints, sore muscles  Neurological: Denies weakness or headaches  Skin: No rashes    Blood pressure 132/80, pulse 81, temperature 36.4 °C (97.5 °F), height 1.588 m (5' 2.52\"), weight 113.399 kg (250 lb), SpO2 96 %.  Multi-Ox Readings  Multi Ox #1     O2 sat % at rest     O2 sat % on exertion     O2 sat average on exertion     Multi Ox #2   "   O2 sat % at rest     O2 sat % on exertion     O2 sat average on exertion       Oxygen Use     Oxygen Frequency     Duration of need     Is the patient mobile within the home?     CPAP Use?     BIPAP Use?     Servo Titration         Physical Exam:  Appearance: Well-nourished, well-developed, in no acute distress  HEENT: Normocephalic, atraumatic, white sclera, PERRLA, oropharynx clear, Mallampati 3  Neck: No adenopathy or masses  Respiratory: no intercostal retractions or accessory muscle use  Lungs auscultation: Clear to auscultation bilaterally, diminished breath sounds  Cardiovascular: Regular rate rhythm. No murmurs, rubs or gallops.  No LE edema  Abdomen: soft, nondistended  Gait: Normal  Digits: No clubbing, cyanosis  Motor: No focal deficits  Orientation: Oriented to time, person and place    Diagnosis:  1. Pulmonary emphysema, unspecified emphysema type (CMS-[facility])     2. Uncomplicated asthma, unspecified asthma severity  budesonide-formoterol (SYMBICORT) 160-4.5 MCG/ACT Aerosol   3. Morbid obesity with BMI of 45.0-49.9, adult ([facility])     4. Former cigarette smoker     5. LUIS (obstructive sleep apnea)  POLYSOMNOGRAPHY, 4 OR MORE    zolpidem (AMBIEN) 5 MG Tab       Plan:  The patient has COPD/asthma, and would benefit from inhaled corticosteroids in addition to long-acting inhaled bronchodilators. She has been compliant with Spiriva, without subjective benefit. Recommend switching to Symbicort 160 µg at 2 puffs twice a day, with the use of a spacer. Inhaler instructions were provided including to rinse mouth after Symbicort use. Continue albuterol HFA when necessary.  Monitor oximetry readings at work, use supplemental oxygen at 2 L/m if saturations drop below 88% on room air.  There is high clinical suspicion for LUIS, and we will proceed with polysomnography in the sleep laboratory for evaluation.  Return for after sleep study.

## 2017-05-30 NOTE — PROCEDURES
Technician: JOVAN Estrada    Technician Comment:  Good patient effort & cooperation.  The results of this test meet the ATS/ERS standards for acceptability & reproducibility.  Test was performed on the [employer] Body Plethysmograph-Elite DX system.  Predicted values were [facility]-3 for spirometry, [facility] for DLCO, ITS for Lung Volumes.  The DLCO was uncorrected for Hgb.  A bronchodilator of Ventolin HFA -2puffs via spacer administered.      The FVC is 1.91 L or 63%, FEV1 is 1.42 L or 61%, FEV1/FVC 74%. Significant bronchodilator response. The TLC is 104%. Increased RV. DLCO 147%.    Interpretation:  PFT show moderately severe obstructive ventilatory defect, with significant bronchodilator response.  Reduced expiratory reserve volume secondary to obesity.  Interpretation:

## 2017-05-30 NOTE — MR AVS SNAPSHOT
"        Mallorie Xie   2017 2:00 PM   Non-Provider Visit   MRN: [id number]    Department:  [facility]   Dept Phone:  [phone]    Description:  Female : 1955   Provider:  Maren Daniels M.D.           Reason for Visit     COPD           Allergies as of 2017     Allergen Noted Reactions    Bee Venom 2017   Anaphylaxis      You were diagnosed with     Pulmonary emphysema, unspecified emphysema type (CMS-[facility])   [5990813]         Vital Signs     Height Weight Body Mass Index Smoking Status          1.588 m (5' 2.52\") 120.203 kg (265 lb) 47.67 kg/m2 Former Smoker        Basic Information     Date Of Birth Sex Race Ethnicity Preferred Language    1955 Female White Non- English      Your appointments     May 30, 2017  3:00 PM   Pulmonary Function Test with SPIROMETRY/6MW   [facility] (--)    [address] [place] [address] [address]  [place] [id number] [phone]            May 30, 2017  3:40 PM   Established Patient Pul with Maren Daniels M.D.   [facility] (--)    [address] [place] [address] [address]  [place] [id number] [phone]            2017  9:00 AM   Established Patient with KRISTAL Edmonds   [facility]    [address] [facility] [place] [address] [phone]           You will be receiving a confirmation call a few days before your appointment from our automated call confirmation system.            2017 10:00 AM   New Patient with C Rotation   [facility] (--)    [address] [facility] [address]  [place] [address] [phone]           Please bring enclosed paperwork completed along with your insurance card and photo ID.              Problem List              ICD-10-CM Priority Class Noted - Resolved    Tobacco abuse Z72.0   Unknown - Present    Radiculopathy of arm M54.10   2012 - Present    Upper extremity pain, diffuse M79.603   2012 - " Present    Left shoulder pain M25.512   8/27/2014 - Present    Upper back pain on left side M54.9   8/27/2014 - Present    Essential hypertension I10   3/31/2017 - Present    Morbid obesity with BMI of 45.0-49.9, adult ([facility]) E66.01, Z68.42   3/31/2017 - Present    Former cigarette smoker Z87.891   3/31/2017 - Present    Cough R05   3/31/2017 - Present    Shortness of breath R06.02   3/31/2017 - Present    Thyroid nodule E04.1   4/7/2017 - Present    Pulmonary emphysema (CMS-[facility]) J43.9   4/7/2017 - Present    Vision changes H53.9   4/7/2017 - Present    Hypoxia R09.02   4/7/2017 - Present    Abnormal TSH R79.89   4/7/2017 - Present      Health Maintenance        Date Due Completion Dates    IMM DTaP/Tdap/Td Vaccine (1 - Tdap) 5/14/1972 ---    IMM PNEUMOCOCCAL 19-64 (ADULT) MEDIUM RISK SERIES (1 of 1 - PPSV23) 5/14/1972 ---    PAP SMEAR 5/14/1974 ---    MAMMOGRAM 5/14/1993 ---    COLONOSCOPY 5/14/2003 ---    IMM ZOSTER VACCINE 5/14/2013 ---            Current Immunizations     No immunizations on file.      Below and/or attached are the medications your provider expects you to take. Review all of your home medications and newly ordered medications with your provider and/or pharmacist. Follow medication instructions as directed by your provider and/or pharmacist. Please keep your medication list with you and share with your provider. Update the information when medications are discontinued, doses are changed, or new medications (including over-the-counter products) are added; and carry medication information at all times in the event of emergency situations     Allergies:  BEE VENOM - Anaphylaxis               Medications  Valid as of: May 30, 2017 -  2:38 PM    Generic Name Brand Name Tablet Size Instructions for use    Albuterol Sulfate (Aero Soln) albuterol 108 (90 BASE) MCG/ACT Inhale 2 Puffs by mouth every 6 hours as needed for Shortness of Breath.        Levothyroxine Sodium (Tab) SYNTHROID 50 MCG Take 1 Tab by  mouth Every morning on an empty stomach.        Lisinopril-Hydrochlorothiazide (Tab) PRINZIDE, ZESTORETIC 10-12.5 MG Take 1 Tab by mouth every day.        Tiotropium Bromide Monohydrate (Cap) SPIRIVA 18 MCG Inhale 1 Cap by mouth every day.        .                 Medicines prescribed today were sent to:     [facility] [address] [place] [address], [place] - [address] [facility]    [address] [facility] [place] [address]    Phone: [phone] Fax: [phone]    Open 24 Hours?: No      Medication refill instructions:       If your prescription bottle indicates you have medication refills left, it is not necessary to call your provider’s office. Please contact your pharmacy and they will refill your medication.    If your prescription bottle indicates you do not have any refills left, you may request refills at any time through one of the following ways: The online [employer] system (except Urgent Care), by calling your provider’s office, or by asking your pharmacy to contact your provider’s office with a refill request. Medication refills are processed only during regular business hours and may not be available until the next business day. Your provider may request additional information or to have a follow-up visit with you prior to refilling your medication.   *Please Note: Medication refills are assigned a new Rx number when refilled electronically. Your pharmacy may indicate that no refills were authorized even though a new prescription for the same medication is available at the pharmacy. Please request the medicine by name with the pharmacy before contacting your provider for a refill.           [employer] Access Code: Activation code not generated  Current [employer] Status: Active

## 2017-05-30 NOTE — PROCEDURES
Test started on Room Air In minute 2 added 02 and in minute 6 upped 02 to 3lpm finished test on 3 lpm 02. Total distance walked 1000.    Interpretation:  Mild brief desaturations to a cecilio of 86% with rapid recovery within one minute. The patient walked 1000 feet or 83% predicted.

## 2017-06-17 ENCOUNTER — PATIENT MESSAGE (OUTPATIENT)
Dept: MEDICAL GROUP | Facility: PHYSICIAN GROUP | Age: 62
End: 2017-06-17

## 2017-06-19 ENCOUNTER — OFFICE VISIT (OUTPATIENT)
Dept: MEDICAL GROUP | Facility: PHYSICIAN GROUP | Age: 62
End: 2017-06-19
Payer: COMMERCIAL

## 2017-06-19 VITALS
HEIGHT: 63 IN | SYSTOLIC BLOOD PRESSURE: 124 MMHG | RESPIRATION RATE: 20 BRPM | TEMPERATURE: 97.9 F | DIASTOLIC BLOOD PRESSURE: 82 MMHG | WEIGHT: 249 LBS | HEART RATE: 86 BPM | OXYGEN SATURATION: 90 % | BODY MASS INDEX: 44.12 KG/M2

## 2017-06-19 DIAGNOSIS — E03.9 ACQUIRED HYPOTHYROIDISM: ICD-10-CM

## 2017-06-19 DIAGNOSIS — R06.02 SHORTNESS OF BREATH: ICD-10-CM

## 2017-06-19 DIAGNOSIS — R79.89 ABNORMAL TSH: ICD-10-CM

## 2017-06-19 DIAGNOSIS — Z12.39 SCREENING FOR BREAST CANCER: ICD-10-CM

## 2017-06-19 DIAGNOSIS — I10 ESSENTIAL HYPERTENSION: ICD-10-CM

## 2017-06-19 DIAGNOSIS — J43.9 PULMONARY EMPHYSEMA, UNSPECIFIED EMPHYSEMA TYPE (HCC): ICD-10-CM

## 2017-06-19 PROCEDURE — 99214 OFFICE O/P EST MOD 30 MIN: CPT | Performed by: NURSE PRACTITIONER

## 2017-06-19 RX ORDER — LEVOTHYROXINE SODIUM 0.05 MG/1
50 TABLET ORAL
Qty: 90 TAB | Refills: 3 | Status: SHIPPED | OUTPATIENT
Start: 2017-06-19 | End: 2018-05-14 | Stop reason: SDUPTHER

## 2017-06-19 RX ORDER — ALBUTEROL SULFATE 90 UG/1
2 AEROSOL, METERED RESPIRATORY (INHALATION) EVERY 6 HOURS PRN
Qty: 1 INHALER | Refills: 3 | Status: SHIPPED | OUTPATIENT
Start: 2017-06-19 | End: 2017-12-18 | Stop reason: SDUPTHER

## 2017-06-19 NOTE — MR AVS SNAPSHOT
"        Mallorie Xie   2017 9:00 AM   Office Visit   MRN: [id number]    Department:  [facility]   Dept Phone:  [phone]    Description:  Female : 1955   Provider:  SHERI EdmondsRGENET           Reason for Visit     Medication Refill albuterol, levothyroxine      Allergies as of 2017     Allergen Noted Reactions    Bee Venom 2017   Anaphylaxis      You were diagnosed with     Shortness of breath   [786.05.ICD-9-CM]       Pulmonary emphysema, unspecified emphysema type (CMS-HCC)   [7213860]       Abnormal TSH   [620996]         Vital Signs     Blood Pressure Pulse Temperature Respirations Height Weight    124/82 mmHg 86 36.6 °C (97.9 °F) 20 1.588 m (5' 2.52\") 112.946 kg (249 lb)    Body Mass Index Oxygen Saturation Smoking Status             44.79 kg/m2 90% Former Smoker         Basic Information     Date Of Birth Sex Race Ethnicity Preferred Language    1955 Female White Non- English      Your appointments     2017  7:10 PM   Sleep Study Diagnostic with SLEEP TECH   [facility] (--)    [address] [facility] [address]  [place] [address] [phone]            Aug 11, 2017  8:40 AM   Follow UP with YEN ReisPLauraRGENET   [facility] (--)    [address] [facility] [address]  [place] [address] [phone]              Problem List              ICD-10-CM Priority Class Noted - Resolved    Tobacco abuse Z72.0   Unknown - Present    Radiculopathy of arm M54.10   2012 - Present    Upper extremity pain, diffuse M79.603   2012 - Present    Left shoulder pain M25.512   2014 - Present    Upper back pain on left side M54.9   2014 - Present    Essential hypertension I10   3/31/2017 - Present    Morbid obesity with BMI of 45.0-49.9, adult (HCC) E66.01, Z68.42   3/31/2017 - Present    Former cigarette smoker Z87.891   3/31/2017 - Present    Cough R05   3/31/2017 - Present    Shortness of " breath R06.02   3/31/2017 - Present    Thyroid nodule E04.1   4/7/2017 - Present    Pulmonary emphysema (CMS-[facility]) J43.9   4/7/2017 - Present    Vision changes H53.9   4/7/2017 - Present    Hypoxia R09.02   4/7/2017 - Present    Abnormal TSH R79.89   4/7/2017 - Present      Health Maintenance        Date Due Completion Dates    IMM DTaP/Tdap/Td Vaccine (1 - Tdap) 5/14/1972 ---    IMM PNEUMOCOCCAL 19-64 (ADULT) MEDIUM RISK SERIES (1 of 1 - PPSV23) 5/14/1972 ---    PAP SMEAR 5/14/1974 ---    MAMMOGRAM 5/14/1993 ---    COLONOSCOPY 5/14/2003 ---    IMM ZOSTER VACCINE 5/14/2013 ---            Current Immunizations     No immunizations on file.      Below and/or attached are the medications your provider expects you to take. Review all of your home medications and newly ordered medications with your provider and/or pharmacist. Follow medication instructions as directed by your provider and/or pharmacist. Please keep your medication list with you and share with your provider. Update the information when medications are discontinued, doses are changed, or new medications (including over-the-counter products) are added; and carry medication information at all times in the event of emergency situations     Allergies:  BEE VENOM - Anaphylaxis               Medications  Valid as of: June 19, 2017 -  9:15 AM    Generic Name Brand Name Tablet Size Instructions for use    Albuterol Sulfate (Aero Soln) albuterol 108 (90 BASE) MCG/ACT Inhale 2 Puffs by mouth every 6 hours as needed for Shortness of Breath.        Atenolol (Tab) TENORMIN 100 MG Take 100 mg by mouth every day.        Budesonide-Formoterol Fumarate (Aerosol) SYMBICORT 160-4.5 MCG/ACT Inhale 2 Puffs by mouth 2 Times a Day. Use spacer. Rinse mouth after each use.        Cholecalciferol (Tab) cholecalciferol 1000 UNIT Take 1,000 Units by mouth every day.        Fluticasone-Salmeterol (AEROSOL POWDER, BREATH ACTIVATED) ADVAIR DISKUS 250-50 MCG/DOSE         [place]   Take 1  tablet by mouth every day.        Iodine (Kelp)   Take 1 tablet by mouth every day.        Irbesartan (Tab) AVAPRO 150 MG Take 150 mg by mouth every evening.        Levothyroxine Sodium (Tab) SYNTHROID 50 MCG Take 1 Tab by mouth Every morning on an empty stomach.        Lisinopril-Hydrochlorothiazide (Tab) PRINZIDE, ZESTORETIC 10-12.5 MG Take 1 Tab by mouth every day.        Melatonin   Take 1 tablet by mouth at bedtime as needed.        Zolpidem Tartrate (Tab) AMBIEN 5 MG Take 1 Tab by mouth at bedtime as needed for Sleep (1 to 3 po qhs prn insomnia/sleep study. Bring to sleep study.).        .                 Medicines prescribed today were sent to:     [facility] [address] [place] [address] [address] [facility]    [address] [facility] [address]    Phone: [phone] Fax: [phone]    Open 24 Hours?: No      Medication refill instructions:       If your prescription bottle indicates you have medication refills left, it is not necessary to call your provider’s office. Please contact your pharmacy and they will refill your medication.    If your prescription bottle indicates you do not have any refills left, you may request refills at any time through one of the following ways: The online [employer] system (except Urgent Care), by calling your provider’s office, or by asking your pharmacy to contact your provider’s office with a refill request. Medication refills are processed only during regular business hours and may not be available until the next business day. Your provider may request additional information or to have a follow-up visit with you prior to refilling your medication.   *Please Note: Medication refills are assigned a new Rx number when refilled electronically. Your pharmacy may indicate that no refills were authorized even though a new prescription for the same medication is available at the pharmacy. Please request the medicine by name with the pharmacy before contacting your  provider for a refill.        Instructions    Try resistance type exercises like yoga, free-weights     Try experimenting with the O2 monitor, using different finger, use 2 puffs 15 minutes before exercise or exertion    Follow up with me in 6 months    Continue with your healthy eating              MyChart Access Code: Activation code not generated  Current MyChart Status: Active

## 2017-06-19 NOTE — TELEPHONE ENCOUNTER
"From: Mallorie Xie  To: KRISTAL Edmonds  Sent: 6/17/2017 8:48 AM PDT  Subject: Non-Urgent Medical Question    Dharmesh Sheikh,  When I last saw Dr Daniels, the staff mixed me up with another patient because we both have the first name \"Mallorie\". They caught it, but not before they added a bunch of meds to my record that have never been prescribed for nor taken by me. How do I get this info removed from my medical records?    Thank You,  Kori Xie  "

## 2017-06-19 NOTE — PROGRESS NOTES
Chief Complaint   Patient presents with   • Medication Refill     albuterol, levothyroxine         This is a 62 y.o.female patient that presents today with the following: Follow-up visit, review labs, discuss acute and chronic conditions    Acquired hypothyroidism  This is a chronic condition, stable and well-controlled on current dose of levothyroxine, 50 µg. Her most recent TSH is within normal limits at 2.80. She is tolerating medication well with no significant bothersome side effects. She does need refills, these have been called in for her. She does deny symptoms of hypothyroidism. We will recheck this in 6 months.    Pulmonary emphysema (CMS-HCC)  Patient recently diagnosed with pulmonary emphysema, started on Advair. She also uses as needed albuterol. She is a former smoker, quit in October 2016. She developed upper respiratory infection in the winter 3078-0941 which resulted in hypoxemia that took quite some time to resolve. She was then referred to pulmonology who later diagnosed her with COPD as well as uncomplicated asthma. PFT showed  moderately severe obstructive ventilatory defect, with significant bronchodilator response and reduced expiratory reserve volume secondary to obesity.She is currently being worked up for obstructive sleep apnea and has an appointment at the end of July for sleep study. Patient is not quite convinced that she has asthma and feels the most for symptoms are COPD because of her past smoking history.  She does continue to have desaturations with activity, in fact her O2 saturation was 85% when she walked from the waiting room into examining room today, but with a few deep breaths, her saturations came up to 90%. During her last visit with pulmonology on May 30, her O2 saturations on room air were 96%. She states that overall her breathing feels like it is better. She does deny cough, shortness of breath, wheezing or any other signs or symptoms of respiratory infection. She  continues on her medications without significant or bothersome side effects. She feels that her symptoms will significantly improve if she can lose some weight and continues to work on this. However she is concerned that every time she exercises her O2 levels go down. I encouraged her to take 2 puffs of albuterol before any kind of exertion. I also encouraged her to try resistance-type exercises as opposed to excessive aerobic-type exercise that may increase her heart rate or respirations symptoms yoga.    Essential hypertension  Chronic in nature, stable and well-controlled on current regimen. Her blood pressures 124/82 and she denies symptoms of hypertension.  she tolerates her medication well with no significant bothersome side effects. She will be due for labs again in 6 months, these have been ordered. She has to have these done before she sees me for follow-up. She is to continue with healthy eating, regular physical activity and continued efforts towards weight loss.        Hospital Outpatient Visit on 05/30/2017   Component Date Value   • TSH 05/30/2017 2.800          clinical course has been stable    Past Medical History   Diagnosis Date   • Obesity    • Tobacco abuse    • Essential hypertension 3/31/2017   • Chickenpox    • Mumps        Past Surgical History   Procedure Laterality Date   • Colonoscopy         Family History   Problem Relation Age of Onset   • No Known Problems Daughter    • Cancer Sister    • Cancer Sister        Bee venom    Current Outpatient Prescriptions Ordered in [facility]   Medication Sig Dispense Refill   • levothyroxine (SYNTHROID) 50 MCG Tab Take 1 Tab by mouth Every morning on an empty stomach. 90 Tab 3   • albuterol 108 (90 BASE) MCG/ACT Aero Soln inhalation aerosol Inhale 2 Puffs by mouth every 6 hours as needed for Shortness of Breath. 1 Inhaler 3   • Iodine, Kelp, (KELP PO) Take 1 tablet by mouth every day.     • MELATONIN PO Take 1 tablet by mouth at bedtime as needed.     •  "zolpidem (AMBIEN) 5 MG Tab Take 1 Tab by mouth at bedtime as needed for Sleep (1 to 3 po qhs prn insomnia/sleep study. Bring to sleep study.). 3 Tab 0   • budesonide-formoterol (SYMBICORT) 160-4.5 MCG/ACT Aerosol Inhale 2 Puffs by mouth 2 Times a Day. Use spacer. Rinse mouth after each use. 1 Inhaler 6   • lisinopril-hydrochlorothiazide (PRINZIDE, ZESTORETIC) 10-12.5 MG per tablet Take 1 Tab by mouth every day. 90 Tab 1   • ADVAIR DISKUS 250-50 MCG/DOSE AEROSOL POWDER, BREATH ACTIVATED      • vitamin D (CHOLECALCIFEROL) 1000 UNIT Tab Take 1,000 Units by mouth every day.     • irbesartan (AVAPRO) 150 MG Tab Take 150 mg by mouth every evening.     • atenolol (TENORMIN) 100 MG Tab Take 100 mg by mouth every day.     • LENNY PO Take 1 tablet by mouth every day.       No current [facility]-ordered facility-administered medications on file.       Constitutional ROS: No unexpected change in weight, No weakness, No unexplained fevers, sweats, or chills  Pulmonary ROS: Positive per history of present illness  Cardiovascular ROS: No chest pain, No edema, No palpitations, Positive for hypertension, controlled  Gastrointestinal ROS: No abdominal pain, No nausea, vomiting, diarrhea, or constipation, no blood in stool  Musculoskeletal/Extremities ROS: No clubbing, No cyanosis, No pain, redness or swelling on the joints  Neurologic ROS: Normal development, No seizures, No weakness  Endocrine ROS: Positive per history of present illness    Physical exam:  /82 mmHg  Pulse 86  Temp(Src) 36.6 °C (97.9 °F)  Resp 20  Ht 1.588 m (5' 2.52\")  Wt 112.946 kg (249 lb)  BMI 44.79 kg/m2  SpO2 90%  General Appearance: Older female, alert, no distress, obese, well-groomed  Skin: Skin color, texture, turgor normal. No rashes or lesions.  Lungs: negative findings: normal respiratory rate and rhythm, lungs clear to auscultation  Heart: negative. RRR without murmur, gallop, or rubs.  No ectopy.  Abdomen: Abdomen soft, non-tender. BS " normal. No masses,  No organomegaly  Musculoskeletal: negative findings: ROM of all joints is normal, no evidence of joint instability, strength normal, no deformities present  Neurologic: intact, oriented, mood appropriate, judgment intact. Cranial nerves II-12 grossly intact    Medical decision making/discussion: Patient here for follow-up visit, review labs. She is to stay on her current dose of levothyroxine and we will recheck labs in 6 months. She will be due for her other routine fasting labs in 6 months before she follows up with me at that time. She is also due for mammogram this has been ordered. She is to continue care per pulmonology and keep appointment coming up for her sleep study. She will be notified of results and further actions if needed. She is to continue with healthy eating, regular physical activity and continued efforts towards weight loss. She is to try nonaerobic type exercise such as yoga or other resistance type routines as discussed.    Mallorie was seen today for medication refill.    Diagnoses and all orders for this visit:    Shortness of breath  -     albuterol 108 (90 BASE) MCG/ACT Aero Soln inhalation aerosol; Inhale 2 Puffs by mouth every 6 hours as needed for Shortness of Breath.    Pulmonary emphysema, unspecified emphysema type (CMS-HCC)  -     albuterol 108 (90 BASE) MCG/ACT Aero Soln inhalation aerosol; Inhale 2 Puffs by mouth every 6 hours as needed for Shortness of Breath.    Abnormal TSH  -     levothyroxine (SYNTHROID) 50 MCG Tab; Take 1 Tab by mouth Every morning on an empty stomach.    Acquired hypothyroidism  -     TSH WITH REFLEX TO FT4; Future    Screening for breast cancer  -     MA-SCREEN MAMMO W/CAD-BILAT; Future    Essential hypertension  -     COMP METABOLIC PANEL; Future  -     LIPID PROFILE; Future          Please note that this dictation was created using voice recognition software. I have made every reasonable attempt to correct obvious errors, but I expect  that there are errors of grammar and possibly content that I did not discover before finalizing the note.

## 2017-06-19 NOTE — PATIENT INSTRUCTIONS
Try resistance type exercises like yoga, free-weights     Try experimenting with the O2 monitor, using different finger, use 2 puffs 15 minutes before exercise or exertion    Follow up with me in 6 months    Continue with your healthy eating

## 2017-07-22 ENCOUNTER — SLEEP STUDY (OUTPATIENT)
Dept: SLEEP MEDICINE | Facility: MEDICAL CENTER | Age: 62
End: 2017-07-22
Attending: INTERNAL MEDICINE
Payer: COMMERCIAL

## 2017-07-22 DIAGNOSIS — G47.33 OSA (OBSTRUCTIVE SLEEP APNEA): ICD-10-CM

## 2017-07-22 PROCEDURE — 95811 POLYSOM 6/>YRS CPAP 4/> PARM: CPT | Performed by: INTERNAL MEDICINE

## 2017-07-22 NOTE — MR AVS SNAPSHOT
Mallorie Xie   2017 7:10 PM   Sleep Study   MRN: [id number]    Department:  [facility]   Dept Phone:  [phone]    Description:  Female : 1955   Provider:  SLEEP TECH           Allergies as of 2017     Allergen Noted Reactions    Bee Venom 2017   Anaphylaxis      You were diagnosed with     LUIS (obstructive sleep apnea)   [579850]         Vital Signs     Smoking Status                   Former Smoker           Basic Information     Date Of Birth Sex Race Ethnicity Preferred Language    1955 Female White Non- English      Your appointments     Aug 11, 2017  8:40 AM   Follow UP with KRISTAL Reis   [facility] (--)    [address]  [address]  [place] [address] [phone]            Aug 11, 2017 10:30 AM   MA SCRN10 with RB MG 2   [facility] [place] ([address])    [address] [place] [address] [address]  [facility] [id number] [phone]           No deodorant, powder, perfume or lotion under the arm or breast area.            Dec 18, 2017  8:00 AM   Established Patient with KRISTAL Edmonds   [facility] ([place])    [address] [address] [phone]           You will be receiving a confirmation call a few days before your appointment from our automated call confirmation system.              Problem List              ICD-10-CM Priority Class Noted - Resolved    Tobacco abuse Z72.0   Unknown - Present    Radiculopathy of arm M54.10   2012 - Present    Upper extremity pain, diffuse M79.603   2012 - Present    Left shoulder pain M25.512   2014 - Present    Upper back pain on left side M54.9   2014 - Present    Essential hypertension I10   3/31/2017 - Present    Morbid obesity with BMI of 45.0-49.9, adult (HCC) E66.01, Z68.42   3/31/2017 - Present    Former cigarette smoker Z87.891   3/31/2017 - Present    Cough R05   3/31/2017 - Present       Shortness of breath R06.02   3/31/2017 - Present    Thyroid nodule E04.1   4/7/2017 - Present    Pulmonary emphysema (CMS-HCC) J43.9   4/7/2017 - Present    Vision changes H53.9   4/7/2017 - Present    Hypoxia R09.02   4/7/2017 - Present    Abnormal TSH R79.89   4/7/2017 - Present    Acquired hypothyroidism E03.9   6/19/2017 - Present      Health Maintenance        Date Due Completion Dates    IMM DTaP/Tdap/Td Vaccine (1 - Tdap) 5/14/1972 ---    IMM PNEUMOCOCCAL 19-64 (ADULT) MEDIUM RISK SERIES (1 of 1 - PPSV23) 5/14/1972 ---    PAP SMEAR 5/14/1974 ---    MAMMOGRAM 5/14/1993 ---    COLONOSCOPY 5/14/2003 ---    IMM ZOSTER VACCINE 5/14/2013 ---    IMM INFLUENZA (1) 9/1/2017 ---            Current Immunizations     No immunizations on file.      Below and/or attached are the medications your provider expects you to take. Review all of your home medications and newly ordered medications with your provider and/or pharmacist. Follow medication instructions as directed by your provider and/or pharmacist. Please keep your medication list with you and share with your provider. Update the information when medications are discontinued, doses are changed, or new medications (including over-the-counter products) are added; and carry medication information at all times in the event of emergency situations     Allergies:  BEE VENOM - Anaphylaxis               Medications  Valid as of: July 23, 2017 -  5:25 AM    Generic Name Brand Name Tablet Size Instructions for use    Albuterol Sulfate (Aero Soln) albuterol 108 (90 BASE) MCG/ACT Inhale 2 Puffs by mouth every 6 hours as needed for Shortness of Breath.        Atenolol (Tab) TENORMIN 100 MG Take 100 mg by mouth every day.        Budesonide-Formoterol Fumarate (Aerosol) SYMBICORT 160-4.5 MCG/ACT Inhale 2 Puffs by mouth 2 Times a Day. Use spacer. Rinse mouth after each use.        Cholecalciferol (Tab) cholecalciferol 1000 UNIT Take 1,000 Units by mouth every day.         Fluticasone-Salmeterol (AEROSOL POWDER, BREATH ACTIVATED) ADVAIR DISKUS 250-50 MCG/DOSE         [place]   Take 1 tablet by mouth every day.        Iodine (Kelp)   Take 1 tablet by mouth every day.        Irbesartan (Tab) AVAPRO 150 MG Take 150 mg by mouth every evening.        Levothyroxine Sodium (Tab) SYNTHROID 50 MCG Take 1 Tab by mouth Every morning on an empty stomach.        Lisinopril-Hydrochlorothiazide (Tab) PRINZIDE, ZESTORETIC 10-12.5 MG Take 1 Tab by mouth every day.        Melatonin   Take 1 tablet by mouth at bedtime as needed.        Zolpidem Tartrate (Tab) AMBIEN 5 MG Take 1 Tab by mouth at bedtime as needed for Sleep (1 to 3 po qhs prn insomnia/sleep study. Bring to sleep study.).        .                 Medicines prescribed today were sent to:     [facility] [address] [place] [address], [place] - [address]    [address] [facility] [address]    Phone: [phone] Fax: [phone]    Open 24 Hours?: No      Medication refill instructions:       If your prescription bottle indicates you have medication refills left, it is not necessary to call your provider’s office. Please contact your pharmacy and they will refill your medication.    If your prescription bottle indicates you do not have any refills left, you may request refills at any time through one of the following ways: The online [employer] system (except Urgent Care), by calling your provider’s office, or by asking your pharmacy to contact your provider’s office with a refill request. Medication refills are processed only during regular business hours and may not be available until the next business day. Your provider may request additional information or to have a follow-up visit with you prior to refilling your medication.   *Please Note: Medication refills are assigned a new Rx number when refilled electronically. Your pharmacy may indicate that no refills were authorized even though a new prescription for the same medication  is available at the pharmacy. Please request the medicine by name with the pharmacy before contacting your provider for a refill.           MyChart Access Code: Activation code not generated  Current MyChart Status: Active

## 2017-07-27 NOTE — PROCEDURES
CLINICAL COMMENTS:  The patient underwent a split night polysomnogram with a CPAP titration using the standard montage for measurement of parameters of sleep, respiratory events, movement abnormalities, heart rate and rhythm. A microphone was used to monitor snoring.    ANALYSIS: The diagnostic recording time was 312.1 minutes with a sleep period of 260.1 minutes.  Total sleep time was 187.0 minutes with a sleep efficiency of 59.9%.  The sleep latency was 52.0 minutes, and REM latency was 160.0 minutes.  The patient had 29 arousals in total, for an arousal index of 9.3.        RESPIRATORY: The patient had 1 apneas in total.  Of these, 1 were obstructive apneas, and 0 were central apneas.  This resulted in an apnea index (AI) of 0.3.  The patient had 66 hypopneas in total, which resulted in a hypopnea index of 21.2.  The overall AHI was 21.5, while the AHI during REM was 17.1.  The supine AHI = 21.5.    OXIMETRY: Oxygen saturation monitoring showed a mean SpO2 of 89.6% for the diagnostic part of the study, with a minimum oxygen saturation of 76.0%.  Oxygen saturations were below 89% for 25.2% of sleep time.    CARDIAC: The highest heart rate for the first part of the study was 93.0 beats per minute.  The average heart rate during sleep was 67.7 bpm, while the highest heart rate was 91.0 bpm.    LIMB MOVEMENTS: There were a total of 314 periodic limb movements during sleep, of which 8 were PLMS arousals.  This resulted in a PLMS index of 100.7 and a PLMS arousal index of 2.6.    TREATMENT    Treatment recording time was 216.6 minutes with a total sleep time of 191.5min.  The patient had an arousal index of 5.0.      RESPIRATORY: The patient had 0 obstructive apneas, 0 central apneas, and 70 hypopneas for an overall AHI was 21.9.    OXIMETRY: The mean SpO2 during treatment was 88.2%, with a minimum oxygen saturation of 77.0%.      Interpretation:    This is a split-night study.    In the diagnostic phase there is  significant fragmentation of sleep with increased wake after sleep onset time totaling more than an hour, increased sleep onset latency of almost 1 hour, and an elevated arousal and awakening index.  Nonetheless significant slow-wave sleep and REM sleep time is recorded.  There are frequent periodic limb movements but the periodic limb movement with arousal index is only 2.6 events per hour.  The apnea hypopnea index is 21.5 events per hour, consisting almost exclusively of hypopnea episodes.  All the diagnostic study was done in the supine body position.  The lowest arterial oxygen saturation is 76% on room air and she spends 43% of the diagnostic time with a saturation below 90%.    In the treatment phase of the study there is a significant improvement in sleep continuity and the periodic limb movement with arousal index falls to 0.3 events per hour.  CPAP was adjusted across a pressure range of 5-12 cm water.  She did well during non-REM sleep at a pressure of 9 but higher pressures were required to address persistent hypopnea events in rem sleep.  At a pressure of 12, the apnea hypopnea index was still 35 events per hour and a lowest arterial oxygen saturation was 85% on room air.  That step in the titration included 12 minutes of REM sleep time, 87 minutes of non-REM sleep time, but no time the supine body position.    Assessment:  Moderate obstructive sleep apnea hypopnea with an apnea hypopnea index of 21.5 events per hour.  Significant nocturnal hypoxemia with a lowest arterial oxygen saturation of 76% on room air.  Fragmentation of sleep related to the breathing events and improved on treatment.  There is a significant but incomplete response to CPAP therapy in the limited time available during this split-night study.    Recommendations:  Options include follow-up polysomnography dedicated to further titration of airway pressurization.  Alternatively auto titrating CPAP with a pressure range of perhaps 8-16  cm water might be reasonable with close clinical follow-up.  She did best with a small Sanjana view mask and heated humidification.

## 2017-08-11 ENCOUNTER — HOSPITAL ENCOUNTER (OUTPATIENT)
Dept: RADIOLOGY | Facility: MEDICAL CENTER | Age: 62
End: 2017-08-11
Attending: NURSE PRACTITIONER
Payer: COMMERCIAL

## 2017-08-11 ENCOUNTER — SLEEP CENTER VISIT (OUTPATIENT)
Dept: SLEEP MEDICINE | Facility: MEDICAL CENTER | Age: 62
End: 2017-08-11
Payer: COMMERCIAL

## 2017-08-11 VITALS
BODY MASS INDEX: 47.48 KG/M2 | WEIGHT: 258 LBS | SYSTOLIC BLOOD PRESSURE: 130 MMHG | OXYGEN SATURATION: 95 % | RESPIRATION RATE: 15 BRPM | DIASTOLIC BLOOD PRESSURE: 80 MMHG | HEART RATE: 71 BPM | HEIGHT: 62 IN

## 2017-08-11 DIAGNOSIS — R09.02 HYPOXIA: ICD-10-CM

## 2017-08-11 DIAGNOSIS — R92.1 BREAST CALCIFICATION, RIGHT: ICD-10-CM

## 2017-08-11 DIAGNOSIS — Z12.39 SCREENING FOR BREAST CANCER: ICD-10-CM

## 2017-08-11 DIAGNOSIS — G47.33 OSA (OBSTRUCTIVE SLEEP APNEA): ICD-10-CM

## 2017-08-11 DIAGNOSIS — E66.01 MORBID OBESITY WITH BMI OF 45.0-49.9, ADULT (HCC): ICD-10-CM

## 2017-08-11 DIAGNOSIS — R06.02 SHORTNESS OF BREATH: ICD-10-CM

## 2017-08-11 DIAGNOSIS — J43.9 PULMONARY EMPHYSEMA, UNSPECIFIED EMPHYSEMA TYPE (HCC): ICD-10-CM

## 2017-08-11 DIAGNOSIS — I10 ESSENTIAL HYPERTENSION: ICD-10-CM

## 2017-08-11 DIAGNOSIS — Z72.0 TOBACCO ABUSE: ICD-10-CM

## 2017-08-11 DIAGNOSIS — R92.8 ABNORMAL MAMMOGRAM OF RIGHT BREAST: ICD-10-CM

## 2017-08-11 PROBLEM — R05.9 COUGH: Status: RESOLVED | Noted: 2017-03-31 | Resolved: 2017-08-11

## 2017-08-11 PROCEDURE — 99214 OFFICE O/P EST MOD 30 MIN: CPT | Performed by: NURSE PRACTITIONER

## 2017-08-11 PROCEDURE — 77063 BREAST TOMOSYNTHESIS BI: CPT

## 2017-08-11 NOTE — PATIENT INSTRUCTIONS
1. Initiate auto CPAP, [facility]  2. Discontinue Symbicort start Anoro, one actuation daily. Rx to pharmacy.  3. Continue albuterol as needed. Up to 2 puffs every 4 hours  4. Restart O2 with exertion, particularly exercise.  5. Follow-up here in 6 weeks, sooner if needed  6. Continue diet resume exercise

## 2017-08-11 NOTE — MR AVS SNAPSHOT
"Mallorie Xie   2017 8:40 AM   Sleep Center Visit   MRN: [id number]    Department:  [facility]   Dept Phone:  [phone]    Description:  Female : 1955   Provider:  KRISTAL Reis           Reason for Visit     Results Sleep Study      Allergies as of 2017     Allergen Noted Reactions    Bee Venom 2017   Anaphylaxis      You were diagnosed with     Hypoxia   [050368]       LUIS (obstructive sleep apnea)   [024742]         Vital Signs     Blood Pressure Pulse Respirations Height Weight Body Mass Index    130/80 mmHg 71 15 1.575 m (5' 2\") 117.028 kg (258 lb) 47.18 kg/m2    Oxygen Saturation Smoking Status                95% Former Smoker          Basic Information     Date Of Birth Sex Race Ethnicity Preferred Language    1955 Female White Non- English      Your appointments     Aug 11, 2017 10:30 AM   MA SCRN10 with [facility] MG 2   [facility] ([address])    [address] [place] [address] [address]  [facility] [address] [phone]           No deodorant, powder, perfume or lotion under the arm or breast area.            Oct 20, 2017  8:20 AM   Follow UP with KRISTAL Reis   [facility] (--)    [address] [place] [address] [address]  [place] [id number] [phone]            Dec 18, 2017  8:00 AM   Established Patient with KRISTAL Edmonds   [address] [address] [phone]           You will be receiving a confirmation call a few days before your appointment from our automated call confirmation system.              Problem List              ICD-10-CM Priority Class Noted - Resolved    Tobacco abuse Z72.0   Unknown - Present    Radiculopathy of arm M54.10   2012 - Present    Upper extremity pain, diffuse M79.603   2012 - Present    Left shoulder pain M25.512   2014 - Present    Upper back pain on left side M54.9   " 8/27/2014 - Present    Essential hypertension I10   3/31/2017 - Present    Morbid obesity with BMI of 45.0-49.9, adult ([facility]) E66.01, Z68.42   3/31/2017 - Present    Former cigarette smoker Z87.891   3/31/2017 - Present    Cough R05   3/31/2017 - Present    Shortness of breath R06.02   3/31/2017 - Present    Thyroid nodule E04.1   4/7/2017 - Present    Pulmonary emphysema (CMS-[facility]) J43.9   4/7/2017 - Present    Vision changes H53.9   4/7/2017 - Present    Hypoxia R09.02   4/7/2017 - Present    Abnormal TSH R79.89   4/7/2017 - Present    Acquired hypothyroidism E03.9   6/19/2017 - Present    LUIS (obstructive sleep apnea) G47.33   8/11/2017 - Present      Health Maintenance        Date Due Completion Dates    IMM DTaP/Tdap/Td Vaccine (1 - Tdap) 5/14/1972 ---    IMM PNEUMOCOCCAL 19-64 (ADULT) MEDIUM RISK SERIES (1 of 1 - PPSV23) 5/14/1972 ---    PAP SMEAR 5/14/1974 ---    MAMMOGRAM 5/14/1993 ---    COLONOSCOPY 5/14/2003 ---    IMM ZOSTER VACCINE 5/14/2013 ---    IMM INFLUENZA (1) 9/1/2017 ---            Current Immunizations     No immunizations on file.      Below and/or attached are the medications your provider expects you to take. Review all of your home medications and newly ordered medications with your provider and/or pharmacist. Follow medication instructions as directed by your provider and/or pharmacist. Please keep your medication list with you and share with your provider. Update the information when medications are discontinued, doses are changed, or new medications (including over-the-counter products) are added; and carry medication information at all times in the event of emergency situations     Allergies:  BEE VENOM - Anaphylaxis               Medications  Valid as of: August 11, 2017 -  9:12 AM    Generic Name Brand Name Tablet Size Instructions for use    Albuterol Sulfate (Aero Soln) albuterol 108 (90 BASE) MCG/ACT Inhale 2 Puffs by mouth every 6 hours as needed for Shortness of Breath.         Atenolol (Tab) TENORMIN 100 MG Take 100 mg by mouth every day.        Budesonide-Formoterol Fumarate (Aerosol) SYMBICORT 160-4.5 MCG/ACT Inhale 2 Puffs by mouth 2 Times a Day. Use spacer. Rinse mouth after each use.        Cholecalciferol (Tab) cholecalciferol 1000 UNIT Take 1,000 Units by mouth every day.        Fluticasone-Salmeterol (AEROSOL POWDER, BREATH ACTIVATED) ADVAIR DISKUS 250-50 MCG/DOSE         [place]   Take 1 tablet by mouth every day.        Iodine (Kelp)   Take 1 tablet by mouth every day.        Irbesartan (Tab) AVAPRO 150 MG Take 150 mg by mouth every evening.        Levothyroxine Sodium (Tab) SYNTHROID 50 MCG Take 1 Tab by mouth Every morning on an empty stomach.        Lisinopril-Hydrochlorothiazide (Tab) PRINZIDE, ZESTORETIC 10-12.5 MG Take 1 Tab by mouth every day.        Melatonin   Take 1 tablet by mouth at bedtime as needed.        Zolpidem Tartrate (Tab) AMBIEN 5 MG Take 1 Tab by mouth at bedtime as needed for Sleep (1 to 3 po qhs prn insomnia/sleep study. Bring to sleep study.).        .                 Medicines prescribed today were sent to:     [facility] [address] [place] [address]    Phone: [phone] Fax: [phone]    Open 24 Hours?: No      Medication refill instructions:       If your prescription bottle indicates you have medication refills left, it is not necessary to call your provider’s office. Please contact your pharmacy and they will refill your medication.    If your prescription bottle indicates you do not have any refills left, you may request refills at any time through one of the following ways: The online [employer] system (except Urgent Care), by calling your provider’s office, or by asking your pharmacy to contact your provider’s office with a refill request. Medication refills are processed only during regular business hours and may not be available until the next business day. Your provider  may request additional information or to have a follow-up visit with you prior to refilling your medication.   *Please Note: Medication refills are assigned a new Rx number when refilled electronically. Your pharmacy may indicate that no refills were authorized even though a new prescription for the same medication is available at the pharmacy. Please request the medicine by name with the pharmacy before contacting your provider for a refill.           [employer] Access Code: Activation code not generated  Current [employer] Status: Active

## 2017-08-11 NOTE — PROGRESS NOTES
Chief Complaint   Patient presents with   • Results     Sleep Study         HPI: This patient is a 62 y.o. female, who presents for sleep study results. Former smoker, 47 pk year hx, quit December 2016. Medical history includes HTN, hypothyroid, obesity BMI 47.    In regards to COPD/asthma, PFTs indicate an FEV1 of 1.42 L 61% predicted , DLCO increased at 147% predicted, positive bronchodilator response and increased DLCO consistent with asthmatic component. Chest CT April 2017 showed upper lobe emphysema. Patient has tried Breo, Spiriva and Symbicort with no subjective benefit. She reports dyspnea only with significant exertion. She denies regular cough or wheeze. She denies URIs. She is frustrated because she's gained approximately 10 pounds in the past month for unknown reasons. She reports this is contributing to her dyspnea. She uses albuterol prior to exertion and feels this works best for her. She notices her oxygen saturation dropping into the 70s when she exercises. Former multiple oximetry indicated the need for 2 L of oxygen with exertion. I encouraged her to use her oxygen when she is exercising. She reports eating well, she watches her diet, she is actively trying to lose weight, she would like to resume exercise.    In regards to LUIS, sleep symptoms include daytime fatigue, she is unaware of snoring or apnea. PSG showed only 60% sleep efficiency, total limb movements were 100, but did not result in arousal. AHI was 21.5, these were almost entirely hypopneas, minimum oxygen saturation 76%. She was titrated on a range of CPAP pressure from 5-13 cm H2O with no clearcut endpoint. For this reason, auto titrating CPAP in the range of 8-15 is recommended.    Past Medical History   Diagnosis Date   • Obesity    • Tobacco abuse    • Essential hypertension 3/31/2017   • Chickenpox    • Mumps        Social History   Substance Use Topics   • Smoking status: Former Smoker -- 1.00 packs/day for 47 years     Types:  "Cigarettes     Quit date: 12/02/2016   • Smokeless tobacco: Never Used   • Alcohol Use: No       Family History   Problem Relation Age of Onset   • No Known Problems Daughter    • Cancer Sister    • Cancer Sister    • Sleep Apnea Neg Hx        Current medications as of today   Current Outpatient Prescriptions   Medication Sig Dispense Refill   • Umeclidinium-Vilanterol (ANORO ELLIPTA) 62.5-25 MCG/INH AEROSOL POWDER, BREATH ACTIVATED Take 1 Inhalation by mouth every day. 1 Each 1   • levothyroxine (SYNTHROID) 50 MCG Tab Take 1 Tab by mouth Every morning on an empty stomach. 90 Tab 3   • albuterol 108 (90 BASE) MCG/ACT Aero Soln inhalation aerosol Inhale 2 Puffs by mouth every 6 hours as needed for Shortness of Breath. 1 Inhaler 3   • Iodine, Kelp, (KELP PO) Take 1 tablet by mouth every day.     • MELATONIN PO Take 1 tablet by mouth at bedtime as needed.     • budesonide-formoterol (SYMBICORT) 160-4.5 MCG/ACT Aerosol Inhale 2 Puffs by mouth 2 Times a Day. Use spacer. Rinse mouth after each use. 1 Inhaler 6   • lisinopril-hydrochlorothiazide (PRINZIDE, ZESTORETIC) 10-12.5 MG per tablet Take 1 Tab by mouth every day. 90 Tab 1     No current facility-administered medications for this visit.       Allergies: Bee venom    Blood pressure 130/80, pulse 71, resp. rate 15, height 1.575 m (5' 2\"), weight 117.028 kg (258 lb), SpO2 95 %.      ROS:   Constitutional: Denies fevers, chills, night sweats, weight loss. Positive for weight gain and fatigue.  Eyes: Denies pain, discharge/drainage  ENT: Denies tinnitus, hearing loss, sinusitis, hoarseness, epistaxis  Allergic: Denies Allergic rhinitis or hayfever  Respiratory: See HPI  Cardiovascular: Denies chest pain, tightness, palpitations, orthopnea or edema  Sleep: See history of present illness  GI/: Denies heartburn, nausea, vomiting, urinary incontinence, hematuria  Musculoskeletal: Denies back pain, painful joints, sore muscles  Neurological: Denies vertigo or " headaches  Skin: Denies rashes, lesions  Psychiatric: Denies depression or anxiety      Physical exam:   Constitutional: Obese, in no acute distress  Eyes: PERRLA  Neck: supple, no masses  Respiratory: no intercostal retractions or accessory muscle use   Lungs auscultation: Diminished breath sounds throughout, otherwise clear, no wheezes or rhonchi  Cardiovascular: Regular rate rhythm no murmurs, rubs or gallops, distant heart tones  Musculoskeletal: Normal gait, no clubbing or cyanosis  Skin: No rashes or lesions  Neuro: No focal deficit, cranial nerves grossly intact  Psychiatric: Oriented to time, person and place.     Diagnosis:  1. Hypoxia  DME CPAP   2. LUIS (obstructive sleep apnea)  DME CPAP   3. Tobacco abuse     4. Essential hypertension     5. Morbid obesity with BMI of 45.0-49.9, adult ([facility])     6. Shortness of breath     7. Pulmonary emphysema, unspecified emphysema type (CMS-[facility])  Umeclidinium-Vilanterol (ANORO ELLIPTA) 62.5-25 MCG/INH AEROSOL POWDER, BREATH ACTIVATED       Plan:  Greater than 50% of this 25 minute visit was spent in counseling, education and coordination of care.     I discussed with the patient her diagnoses of emphysema, reviewed her CT, discussed the pathophysiology of emphysema and asthma. Encouraged to try  Anoro. She does have an asthmatic component and ICS would be preferred, however she does not feel she benefits from this. She is willing to try Anoro. She will continue with albuterol as needed and use O2 with exercise.    I encouraged her to follow-up with her PCP regarding weight gain, she is a history of hypothyroid and may require medication adjustment. Encouraged routine exercise and continued diet.    In regards to LUIS we discussed the pathophysiology of LUIS, reviewed her test in detail, will initiate a CPAP. She requires 2 L O2 bleed in.    Follow-up in 6 weeks to review compliance download and determine effectiveness of Anoro

## 2017-08-18 DIAGNOSIS — R92.8 ABNORMAL MAMMOGRAM: ICD-10-CM

## 2017-09-01 ENCOUNTER — HOSPITAL ENCOUNTER (OUTPATIENT)
Dept: RADIOLOGY | Facility: MEDICAL CENTER | Age: 62
End: 2017-09-01
Attending: NURSE PRACTITIONER
Payer: COMMERCIAL

## 2017-09-01 DIAGNOSIS — R92.1 BREAST CALCIFICATION, RIGHT: ICD-10-CM

## 2017-09-01 DIAGNOSIS — R92.8 ABNORMAL MAMMOGRAM OF RIGHT BREAST: ICD-10-CM

## 2017-09-01 PROCEDURE — G0206 DX MAMMO INCL CAD UNI: HCPCS | Mod: RT

## 2017-09-06 DIAGNOSIS — R79.89 ABNORMAL TSH: ICD-10-CM

## 2017-09-06 DIAGNOSIS — R92.1 BREAST CALCIFICATION, RIGHT: ICD-10-CM

## 2017-09-06 RX ORDER — LEVOTHYROXINE SODIUM 0.05 MG/1
50 TABLET ORAL
Qty: 90 TAB | Refills: 3 | Status: CANCELLED | OUTPATIENT
Start: 2017-09-06

## 2017-09-06 NOTE — TELEPHONE ENCOUNTER
Was the patient seen in the last year in this department? Yes     Does patient have an active prescription for medications requested? No     Received Request Via: Patient     Patient sent [employer] message stating that the pharmacy has informed her that she needs a new Rx.

## 2017-09-07 DIAGNOSIS — R60.9 EDEMA, UNSPECIFIED TYPE: ICD-10-CM

## 2017-09-07 RX ORDER — LISINOPRIL AND HYDROCHLOROTHIAZIDE 12.5; 1 MG/1; MG/1
1 TABLET ORAL DAILY
Qty: 90 TAB | Refills: 2 | Status: SHIPPED | OUTPATIENT
Start: 2017-09-07 | End: 2018-07-17 | Stop reason: SDUPTHER

## 2017-09-28 ENCOUNTER — TELEPHONE (OUTPATIENT)
Dept: RADIOLOGY | Facility: MEDICAL CENTER | Age: 62
End: 2017-09-28

## 2017-09-28 NOTE — TELEPHONE ENCOUNTER
Spoke w/ pt to tati apt @ [facility] on 9/29 @ 8:45 check in @ 8:15, reviewed prep and location

## 2017-09-29 ENCOUNTER — HOSPITAL ENCOUNTER (OUTPATIENT)
Dept: RADIOLOGY | Facility: MEDICAL CENTER | Age: 62
End: 2017-09-29
Attending: NURSE PRACTITIONER
Payer: COMMERCIAL

## 2017-09-29 DIAGNOSIS — R92.1 BREAST CALCIFICATION, RIGHT: ICD-10-CM

## 2017-09-29 PROCEDURE — 88305 TISSUE EXAM BY PATHOLOGIST: CPT

## 2017-09-29 PROCEDURE — 88360 TUMOR IMMUNOHISTOCHEM/MANUAL: CPT

## 2017-09-29 PROCEDURE — 19081 BX BREAST 1ST LESION STRTCTC: CPT

## 2017-10-02 ENCOUNTER — TELEPHONE (OUTPATIENT)
Dept: MEDICAL GROUP | Facility: PHYSICIAN GROUP | Age: 62
End: 2017-10-02

## 2017-10-02 DIAGNOSIS — D05.11 DUCTAL CARCINOMA IN SITU (DCIS) OF RIGHT BREAST: ICD-10-CM

## 2017-10-02 NOTE — TELEPHONE ENCOUNTER
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 Informed her that I put in referral to Dr. Mansfield's office urgently. Pt did not have any questions at this time but will or send me message via [employer], please call Dr. Mansfield's office to let them know referral was placed urgently this am

## 2017-10-11 ENCOUNTER — PATIENT OUTREACH (OUTPATIENT)
Dept: OTHER | Facility: MEDICAL CENTER | Age: 62
End: 2017-10-11

## 2017-10-16 ENCOUNTER — HOSPITAL ENCOUNTER (OUTPATIENT)
Dept: RADIATION ONCOLOGY | Facility: MEDICAL CENTER | Age: 62
End: 2017-10-31
Attending: RADIOLOGY
Payer: COMMERCIAL

## 2017-10-16 ENCOUNTER — PATIENT OUTREACH (OUTPATIENT)
Dept: OTHER | Facility: MEDICAL CENTER | Age: 62
End: 2017-10-16

## 2017-10-16 VITALS
SYSTOLIC BLOOD PRESSURE: 146 MMHG | RESPIRATION RATE: 22 BRPM | HEART RATE: 75 BPM | WEIGHT: 269.7 LBS | TEMPERATURE: 97.8 F | DIASTOLIC BLOOD PRESSURE: 67 MMHG | HEIGHT: 62 IN | BODY MASS INDEX: 49.63 KG/M2 | OXYGEN SATURATION: 92 %

## 2017-10-16 DIAGNOSIS — C50.911 MALIGNANT NEOPLASM OF RIGHT FEMALE BREAST, UNSPECIFIED ESTROGEN RECEPTOR STATUS, UNSPECIFIED SITE OF BREAST (HCC): ICD-10-CM

## 2017-10-16 PROCEDURE — 99205 OFFICE O/P NEW HI 60 MIN: CPT | Performed by: RADIOLOGY

## 2017-10-16 PROCEDURE — 99214 OFFICE O/P EST MOD 30 MIN: CPT | Performed by: RADIOLOGY

## 2017-10-16 ASSESSMENT — PAIN SCALES - GENERAL: PAINLEVEL: NO PAIN

## 2017-10-16 NOTE — NON-PROVIDER
"Patient was seen today in clinic with Dr. Boateng for consult.  Vitals signs and weight were obtained and pain assessment was completed.  Allergies and medications were reviewed with the patient.  Review of systems completed.     Vitals/Pain:  Vitals:    10/16/17 0802   BP: 146/67   Pulse: 75   Resp: (!) 22   Temp: 36.6 °C (97.8 °F)   SpO2: 92%   Weight: 122.3 kg (269 lb 11.2 oz)   Height: 1.575 m (5' 2\")   Pain Score: No pain          Allergies:   Bee venom    Current Medications:    Current Outpatient Prescriptions:   •  lisinopril-hydrochlorothiazide (PRINZIDE, ZESTORETIC) 10-12.5 MG per tablet, Take 1 Tab by mouth every day., Disp: 90 Tab, Rfl: 2  •  levothyroxine (SYNTHROID) 50 MCG Tab, Take 1 Tab by mouth Every morning on an empty stomach., Disp: 90 Tab, Rfl: 3  •  albuterol 108 (90 BASE) MCG/ACT Aero Soln inhalation aerosol, Inhale 2 Puffs by mouth every 6 hours as needed for Shortness of Breath., Disp: 1 Inhaler, Rfl: 3  •  Iodine, Kelp, (KELP PO), Take 1 tablet by mouth every day., Disp: , Rfl:   •  MELATONIN PO, Take 1 tablet by mouth at bedtime as needed., Disp: , Rfl:   •  budesonide-formoterol (SYMBICORT) 160-4.5 MCG/ACT Aerosol, Inhale 2 Puffs by mouth 2 Times a Day. Use spacer. Rinse mouth after each use., Disp: 1 Inhaler, Rfl: 6      PCP:  Dean Jaeger R.N.   10/16/2017  8:12 AM      "

## 2017-10-16 NOTE — PROGRESS NOTES
Met with pt after XRT consult.  Pt states at this time her POC includes a lumpectomy and placement of a SHIRLEY.  She works full time.  She states she has sufficient PTO and has applied for intermittent FMLA.  She lives in [place] and works in [place].        DX  DCIS      POC   Lumpectomy & APBI      FAMILY HX    none      BARRIERS ASSESSMENT:    TRANSPORTATION   Unable to drive while taking pain medications.  She has decided that she will stay in the [facility] post op and during her APBI tx.  FINANCIAL  Additional expenses r/t gas and hotel.   INSURANCE  Denies   SUPPORT SYSTEM limited support system.  Has some family that lives out of the area.   PSYCHOLOGICAL   0/10 on distress  COMMUNICATION   n/a  FAMILY CARE  n/a       INTERVENTION  MOTR application provided to request assistance to cover cost of transportation and lodging.  Call to RAMU Yuen with Dr. Mansfield to help clarify scheduling of lumpectomy and SHIRLEY placement.  Pt will request pre op labs be drawn closer to home and that pre registration be done via phone.

## 2017-10-16 NOTE — CONSULTS
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 Xie today in consultation at the request of Dr. Mansfield for breast cancer prior to surgery. Patient is a 62-year-old woman who was appreciated by mammography to have an abnormality in the right breast. This was a new area of microcalcifications. Stereotactic biopsy was done of this area showed high-grade ductal carcinoma in situ ER/NH positive. After discussing her options with Dr. Mansfield she is interested in breast conservation surgery. More specifically she feels she is interested in accelerated partial breast radiation. This is not only due to her body habitus and breast size but also distance from radiation Center and work needs. She is here today to discuss the implications of accelerated partial breast radiation as it relates to breast conservation management.    PAST MEDICAL HISTORY:   Past Medical History:   Diagnosis Date   • Essential hypertension 3/31/2017   • Breast cancer (CMS-HCC)     2017   • Chickenpox    • Colon polyps     2017   • Mumps    • Obesity    • Tobacco abuse        PAST SURGICAL HISTORY:  Past Surgical History:   Procedure Laterality Date   • COLONOSCOPY         GYNECOLOGICAL STATUS:   P: 2  First pregnancy: 25 yrs  FMP: 13 yrs  LMP: 48 yrs    HORMONE USE:  BCP: 2 yrs  HRT: None    CURRENT MEDICATIONS:  Current Outpatient Prescriptions   Medication Sig Dispense Refill   • lisinopril-hydrochlorothiazide (PRINZIDE, ZESTORETIC) 10-12.5 MG per tablet Take 1 Tab by mouth every day. 90 Tab 2   • levothyroxine (SYNTHROID) 50 MCG Tab Take 1 Tab by mouth Every morning on an empty stomach. 90 Tab 3   • albuterol 108 (90 BASE) MCG/ACT Aero Soln inhalation aerosol Inhale 2 Puffs by mouth every 6 hours as needed for Shortness of Breath. 1 Inhaler 3   • Iodine,  "Kelp, (KELP PO) Take 1 tablet by mouth every day.     • MELATONIN PO Take 1 tablet by mouth at bedtime as needed.     • budesonide-formoterol (SYMBICORT) 160-4.5 MCG/ACT Aerosol Inhale 2 Puffs by mouth 2 Times a Day. Use spacer. Rinse mouth after each use. 1 Inhaler 6     No current facility-administered medications for this encounter.        ALLERGIES:    Bee venom    FAMILY HISTORY:    Family History   Problem Relation Age of Onset   • No Known Problems Daughter    • Cancer Sister      cervical   • Cancer Sister      renal   • Cancer Maternal Uncle      lung   • Cancer Maternal Uncle      melanoma   • Sleep Apnea Neg Hx        SOCIAL HISTORY:    Social History   Substance Use Topics   • Smoking status: Former Smoker     Packs/day: 1.00     Years: 47.00     Types: Cigarettes     Quit date: 12/2/2016   • Smokeless tobacco: Never Used   • Alcohol use No     Patient is working for the [employer]  Lives alone in [place]     REVIEW OF SYSTEMS:  A 18 point review of systems was completed in patient's chart on 10/16/2017.  All are negative with relationship to this diagnosis with the exception of:  Well healing from lumpectomy    PHYSICAL EXAM:    Vitals:    10/16/17 0802   BP: 146/67   Pulse: 75   Resp: (!) 22   Temp: 36.6 °C (97.8 °F)   SpO2: 92%   Weight: 122.3 kg (269 lb 11.2 oz)   Height: 1.575 m (5' 2\")   Pain Score: No pain      1= Restricted in physically strenuous activity, but ambulatory and able to carry out work of a light sedentary nature, e.g., light housework, office work.        GENERAL: No apparent distress.  HEENT:  Pupils are equal, round, and reactive to light.  Extraocular muscles   are intact. Sclerae nonicteric.  Conjunctivae pink.  Oral cavity, tongue   protrudes midline.   NECK:  Supple without evidence of thyromegaly.  NODES:  No peripheral adenopathy of the neck, supraclavicular fossa or axillae   bilaterally.  LUNGS:  Clear to ascultation bilaterally   HEART:  Regular rate and " rhythm.  No murmur appreciated  BREAST: No suspicious lesions found in either breast or axilla.  ABDOMEN:  Soft. No evidence of hepatosplenomegaly.  Positive bowel sounds.  EXTREMITIES:  Without Edema.  NEUROLOGIC:  Cranial nerves II through XII were intact. 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 I 1st distinguished to the patient the difference between ductal carcinoma in situ and invasive ductal carcinoma. And implications if invasive disease is detected on final pathology. We discussed mastectomy versus breast conservation management with lumpectomy with or without radiation. We discussed these choices in both invasive and ductal carcinoma in situ setting. We also compared and contrasted external beam radiotherapy with implanted accelerated partial breast radiation with SHIRLEY.  Patient does have a larger body habitus as well as larger breast size making her an ideal candidate for implanted radiation as opposed external beam radiation. We discussed the challenges that may present given her breast size and body habitus with external beam radiation. Most notably reproducible set up and risk of breast edema. In addition patient states coming from [facility] she will need to stay in a hotel room during her treatment so more accelerated treatment course would be preferred. Finally she would like to have as little absence him from work especially as the holiday season is approaching. For that reason she would prefer an accelerated partial breast approach. We discussed the criteria for inclusion for accelerated partial breast radiation and things that could be found that would not make her  an ideal candidate. Patient is understanding of this and would except external beam radiation if it were deemed to be the safest option moving forward based on final pathology. After discussing all the risks benefits and side effects of therapy patient wishes to proceed with treatment and we have contacted Dr. Mansfield's office.    We discussed the risks, benefits and side effects of treatment and the patient is amenable to treatment.  If patient has any questions or concerns, she should feel free to contact me.    Thank you for the opportunity to participate in her care.  If any questions or comments, please do not hesitate in calling.

## 2017-10-20 ENCOUNTER — SLEEP CENTER VISIT (OUTPATIENT)
Dept: SLEEP MEDICINE | Facility: MEDICAL CENTER | Age: 62
End: 2017-10-20
Payer: COMMERCIAL

## 2017-10-20 VITALS
DIASTOLIC BLOOD PRESSURE: 80 MMHG | BODY MASS INDEX: 49.13 KG/M2 | OXYGEN SATURATION: 95 % | SYSTOLIC BLOOD PRESSURE: 130 MMHG | HEIGHT: 62 IN | HEART RATE: 70 BPM | RESPIRATION RATE: 15 BRPM | WEIGHT: 267 LBS

## 2017-10-20 DIAGNOSIS — Z01.810 PRE-OPERATIVE CARDIOVASCULAR EXAMINATION: ICD-10-CM

## 2017-10-20 DIAGNOSIS — E66.01 MORBID OBESITY WITH BMI OF 45.0-49.9, ADULT (HCC): ICD-10-CM

## 2017-10-20 DIAGNOSIS — Z01.812 PRE-PROCEDURAL LABORATORY EXAMINATION: ICD-10-CM

## 2017-10-20 DIAGNOSIS — G47.33 OSA (OBSTRUCTIVE SLEEP APNEA): ICD-10-CM

## 2017-10-20 DIAGNOSIS — Z87.891 FORMER CIGARETTE SMOKER: ICD-10-CM

## 2017-10-20 DIAGNOSIS — J43.9 PULMONARY EMPHYSEMA, UNSPECIFIED EMPHYSEMA TYPE (HCC): ICD-10-CM

## 2017-10-20 DIAGNOSIS — Z72.0 TOBACCO ABUSE: ICD-10-CM

## 2017-10-20 LAB
ANION GAP SERPL CALC-SCNC: 6 MMOL/L (ref 0–11.9)
BUN SERPL-MCNC: 20 MG/DL (ref 8–22)
CALCIUM SERPL-MCNC: 9.8 MG/DL (ref 8.5–10.5)
CHLORIDE SERPL-SCNC: 108 MMOL/L (ref 96–112)
CO2 SERPL-SCNC: 25 MMOL/L (ref 20–33)
CREAT SERPL-MCNC: 0.57 MG/DL (ref 0.5–1.4)
EKG IMPRESSION: NORMAL
ERYTHROCYTE [DISTWIDTH] IN BLOOD BY AUTOMATED COUNT: 41.6 FL (ref 35.9–50)
GFR SERPL CREATININE-BSD FRML MDRD: >60 ML/MIN/1.73 M 2
GLUCOSE SERPL-MCNC: 75 MG/DL (ref 65–99)
HCT VFR BLD AUTO: 41.1 % (ref 37–47)
HGB BLD-MCNC: 13.6 G/DL (ref 12–16)
MCH RBC QN AUTO: 31.2 PG (ref 27–33)
MCHC RBC AUTO-ENTMCNC: 33.1 G/DL (ref 33.6–35)
MCV RBC AUTO: 94.3 FL (ref 81.4–97.8)
PLATELET # BLD AUTO: 205 K/UL (ref 164–446)
PMV BLD AUTO: 11.4 FL (ref 9–12.9)
POTASSIUM SERPL-SCNC: 4 MMOL/L (ref 3.6–5.5)
RBC # BLD AUTO: 4.36 M/UL (ref 4.2–5.4)
SODIUM SERPL-SCNC: 139 MMOL/L (ref 135–145)
WBC # BLD AUTO: 8 K/UL (ref 4.8–10.8)

## 2017-10-20 PROCEDURE — 85027 COMPLETE CBC AUTOMATED: CPT

## 2017-10-20 PROCEDURE — 93005 ELECTROCARDIOGRAM TRACING: CPT

## 2017-10-20 PROCEDURE — 93010 ELECTROCARDIOGRAM REPORT: CPT | Performed by: INTERNAL MEDICINE

## 2017-10-20 PROCEDURE — 99214 OFFICE O/P EST MOD 30 MIN: CPT | Performed by: NURSE PRACTITIONER

## 2017-10-20 PROCEDURE — 36415 COLL VENOUS BLD VENIPUNCTURE: CPT

## 2017-10-20 PROCEDURE — 80048 BASIC METABOLIC PNL TOTAL CA: CPT

## 2017-10-20 NOTE — PROGRESS NOTES
Chief Complaint   Patient presents with   • Follow-Up     complience DL         HPI: This patient is a 62 y.o. female, who presents for follow up LUIS and compliance download. She is a former smoker, 47 pk year hx, quit December 2016. Medical history includes HTN, hypothyroid, obesity BMI 49. Recent diagnosis of breast cancer, she is scheduled for surgery with Dr. Mansfield and radiation later this week.      In regards to LUIS, sleep symptoms include daytime fatigue, she is unaware of snoring or apnea. PSG showed only 60% sleep efficiency, total limb movements were 100, but did not result in arousal. AHI was 21.5, these were almost entirely hypopneas, minimum oxygen saturation 76%. She was initiated on CPAP 8-12 cm H2O at her last visit. Compliance report shows 100% use, average use of almost 8 hours per night, AHI has been reduced to 2.7. Unfortunately she has not noticed any benefit. She wakes periodically throughout the night. She's tried 2 different masks. Has not noticed improvement in her fatigue. Is frustrated with inability to lose weight.   In regards to COPD/asthma, PFTs indicate an FEV1 of 1.42 L 61% predicted , DLCO increased at 147% predicted, positive bronchodilator response and increased DLCO consistent with asthmatic component. Chest CT April 2017 showed upper lobe emphysema. Patient has tried Breo, Spiriva and Anoro. She is resume Symbicort, feels this works best for her. She uses albuterol prior to exertion. She supposed to be using 2 L O2 with exertion but is not always compliant with this. She would like to start exercising again has no energy to.    Past Medical History:   Diagnosis Date   • Asthma    • Bowel habit changes    • Breast cancer (CMS-[facility])     2017   • Breath shortness    • Cancer (CMS-[facility])     Breast   • Chickenpox    • Colon polyps     2017   • Congestive heart failure (CMS-[facility])    • Dental disorder     Full upper/lower dentures.   • Emphysema of lung (CMS-[facility])    • Essential hypertension  "3/31/2017   • Heart burn    • Indigestion    • Mumps    • Obesity    • Sleep apnea     CPAP use.   • Tobacco abuse        Social History   Substance Use Topics   • Smoking status: Former Smoker     Packs/day: 1.00     Years: 47.00     Types: Cigarettes     Quit date: 12/2/2016   • Smokeless tobacco: Never Used   • Alcohol use No       Family History   Problem Relation Age of Onset   • No Known Problems Daughter    • Cancer Sister      cervical   • Cancer Sister      renal   • Cancer Maternal Uncle      lung   • Cancer Maternal Uncle      melanoma   • Sleep Apnea Neg Hx        Current medications as of today   Current Outpatient Prescriptions   Medication Sig Dispense Refill   • lisinopril-hydrochlorothiazide (PRINZIDE, ZESTORETIC) 10-12.5 MG per tablet Take 1 Tab by mouth every day. 90 Tab 2   • levothyroxine (SYNTHROID) 50 MCG Tab Take 1 Tab by mouth Every morning on an empty stomach. 90 Tab 3   • albuterol 108 (90 BASE) MCG/ACT Aero Soln inhalation aerosol Inhale 2 Puffs by mouth every 6 hours as needed for Shortness of Breath. 1 Inhaler 3   • Iodine, Kelp, (KELP PO) Take 1 tablet by mouth every day.     • MELATONIN PO Take 1 tablet by mouth at bedtime as needed.     • budesonide-formoterol (SYMBICORT) 160-4.5 MCG/ACT Aerosol Inhale 2 Puffs by mouth 2 Times a Day. Use spacer. Rinse mouth after each use. 1 Inhaler 6     No current facility-administered medications for this visit.        Allergies: Bee venom    Blood pressure 130/80, pulse 70, resp. rate 15, height 1.575 m (5' 2\"), weight 121.1 kg (267 lb), SpO2 95 %.      ROS:   Constitutional: Denies fevers, chills, night sweats, weight loss. Positive fatigue.  Eyes: Denies pain, discharge/drainage  ENT: Denies tinnitus, hearing loss, sinusitis, hoarseness, epistaxis  Allergic: Denies Allergic rhinitis or hayfever  Respiratory: Denies cough, wheeze, dyspnea, hemoptysis  Cardiovascular: Denies chest pain, tightness, palpitations, orthopnea or edema  Sleep: See " HPI  Neurological: Denies vertigo or headaches  Skin: Denies rashes, lesions  Psychiatric: Denies depression or anxiety    Physical exam:   Constitutional: Obese, in no acute distress  Eyes: PERRLA  Neck: supple, no masses  Respiratory: no intercostal retractions or accessory muscle use   Lungs auscultation: Diminished throughout  Cardiovascular: Regular rate rhythm no murmurs, rubs or gallops  Musculoskeletal: no clubbing or cyanosis  Skin: No rashes or lesions  Neuro: No focal deficit, cranial nerves grossly intact  Psychiatric: Oriented to time, person and place.     Diagnosis:  1. Tobacco abuse     2. Morbid obesity with BMI of 45.0-49.9, adult ([facility])     3. Former cigarette smoker     4. Pulmonary emphysema, unspecified emphysema type (CMS-[facility])     5. LUIS (obstructive sleep apnea)         Plan:  Patient's sleep compliance shows excellent compliance, adequate treatment of sleep apnea. She uses 2 L O2 bleed in. Part of her fatigue may be a combination of multiple medical issues. She would like to focus on taking care of breast cancer for now. She will continue with CPAP nightly. Follow-up in 3 months with compliance download and to see how she is adjusting to therapy.

## 2017-10-20 NOTE — OR NURSING
During patients pre admit appointment this morning she stated that she's staying the night at the [facility] after surgery so she doesn't need a  day of surgery.  Per Tala at Dr. Mansfield's office patient is calling the office first thing Monday morning to clarify with Dr. Donal Yuen's medical assistant, that it's okay for her to stay at the [facility] after surgery without family and/or friends.

## 2017-10-24 ENCOUNTER — APPOINTMENT (OUTPATIENT)
Dept: RADIOLOGY | Facility: MEDICAL CENTER | Age: 62
End: 2017-10-24
Attending: SURGERY
Payer: COMMERCIAL

## 2017-10-24 ENCOUNTER — HOSPITAL ENCOUNTER (OUTPATIENT)
Facility: MEDICAL CENTER | Age: 62
End: 2017-10-24
Attending: SURGERY | Admitting: SURGERY
Payer: COMMERCIAL

## 2017-10-24 VITALS
TEMPERATURE: 97 F | WEIGHT: 262.35 LBS | HEART RATE: 75 BPM | BODY MASS INDEX: 48.28 KG/M2 | RESPIRATION RATE: 24 BRPM | HEIGHT: 62 IN | OXYGEN SATURATION: 96 % | SYSTOLIC BLOOD PRESSURE: 118 MMHG | DIASTOLIC BLOOD PRESSURE: 90 MMHG

## 2017-10-24 DIAGNOSIS — D05.11 INTRADUCTAL CARCINOMA IN SITU OF RIGHT BREAST: ICD-10-CM

## 2017-10-24 PROCEDURE — 19281 PERQ DEVICE BREAST 1ST IMAG: CPT | Mod: RT

## 2017-10-24 PROCEDURE — 160048 HCHG OR STATISTICAL LEVEL 1-5: Performed by: SURGERY

## 2017-10-24 PROCEDURE — 160009 HCHG ANES TIME/MIN: Performed by: SURGERY

## 2017-10-24 PROCEDURE — 160036 HCHG PACU - EA ADDL 30 MINS PHASE I: Performed by: SURGERY

## 2017-10-24 PROCEDURE — 160029 HCHG SURGERY MINUTES - 1ST 30 MINS LEVEL 4: Performed by: SURGERY

## 2017-10-24 PROCEDURE — 700111 HCHG RX REV CODE 636 W/ 250 OVERRIDE (IP)

## 2017-10-24 PROCEDURE — 160041 HCHG SURGERY MINUTES - EA ADDL 1 MIN LEVEL 4: Performed by: SURGERY

## 2017-10-24 PROCEDURE — 160035 HCHG PACU - 1ST 60 MINS PHASE I: Performed by: SURGERY

## 2017-10-24 PROCEDURE — 88307 TISSUE EXAM BY PATHOLOGIST: CPT

## 2017-10-24 PROCEDURE — 76098 X-RAY EXAM SURGICAL SPECIMEN: CPT | Mod: RT

## 2017-10-24 PROCEDURE — 160002 HCHG RECOVERY MINUTES (STAT): Performed by: SURGERY

## 2017-10-24 PROCEDURE — A9270 NON-COVERED ITEM OR SERVICE: HCPCS

## 2017-10-24 PROCEDURE — 501838 HCHG SUTURE GENERAL: Performed by: SURGERY

## 2017-10-24 PROCEDURE — 700102 HCHG RX REV CODE 250 W/ 637 OVERRIDE(OP)

## 2017-10-24 PROCEDURE — A6402 STERILE GAUZE <= 16 SQ IN: HCPCS | Performed by: SURGERY

## 2017-10-24 PROCEDURE — A6403 STERILE GAUZE>16 <= 48 SQ IN: HCPCS | Performed by: SURGERY

## 2017-10-24 PROCEDURE — 500122 HCHG BOVIE, BLADE: Performed by: SURGERY

## 2017-10-24 PROCEDURE — A6404 STERILE GAUZE > 48 SQ IN: HCPCS | Performed by: SURGERY

## 2017-10-24 RX ORDER — ALPRAZOLAM 0.25 MG/1
TABLET ORAL
Status: COMPLETED
Start: 2017-10-24 | End: 2017-10-24

## 2017-10-24 RX ORDER — BUPIVACAINE HYDROCHLORIDE AND EPINEPHRINE 5; 5 MG/ML; UG/ML
INJECTION, SOLUTION PERINEURAL
Status: DISCONTINUED | OUTPATIENT
Start: 2017-10-24 | End: 2017-10-24 | Stop reason: HOSPADM

## 2017-10-24 RX ORDER — SODIUM CHLORIDE, SODIUM LACTATE, POTASSIUM CHLORIDE, CALCIUM CHLORIDE 600; 310; 30; 20 MG/100ML; MG/100ML; MG/100ML; MG/100ML
INJECTION, SOLUTION INTRAVENOUS CONTINUOUS
Status: DISCONTINUED | OUTPATIENT
Start: 2017-10-24 | End: 2017-10-24 | Stop reason: HOSPADM

## 2017-10-24 RX ADMIN — ALPRAZOLAM 0.25 MG: 0.25 TABLET ORAL at 13:10

## 2017-10-24 RX ADMIN — SODIUM CHLORIDE, SODIUM LACTATE, POTASSIUM CHLORIDE, CALCIUM CHLORIDE: 600; 310; 30; 20 INJECTION, SOLUTION INTRAVENOUS at 13:20

## 2017-10-24 ASSESSMENT — PAIN SCALES - GENERAL
PAINLEVEL_OUTOF10: 0

## 2017-10-24 NOTE — OR NURSING
RECEIVED FROM OR WITH DR SHIPLEY.  PATIENT SLEEPY  VSS.  GAUZE AND TEGADERM DRESSING ON RIGHT BREAST DRY AND IN TACT.  1615 MORE AWAKE.  COVERED WITH WARM BLANKETS.  DENIES PAIN/NAUSEA.  NOT READY FOR PO FLUIDS.  WARMER STARTED.  1630 FRIEND CALLED.  WILL BE HERE IN 20 MINUTES.  PATIENT ON ROOM AIR.  1700 UP AND DRESSED.  FRIEND HERE.PATIENT FEELS READY FOR DISCHARGE.  INSTRUCTIONS GIVEN TO PATIENT AND FRIEND. RX FOR NORCO AND ZOFRAN REFUSED BY PATIENT; DR BA HAD GIVEN HER AN RX FOR PERCOCET AND ZOFRAN PREOPERATIVELY; SHREDDED.   ALL QUESTIONS ANSWERED.

## 2017-10-24 NOTE — OP REPORT
Pre op diagnosis:  DCIS of right breast  Post op diagnosis:  Same  Procedure:  Wire localized right partial mastectomy    Surgeon:  Marina Mansfield MD  Anesthesiologist:  Karan Uribe Md    Anesthesia:  General LMA  Pre op meds:  Ancef  ASA 3    Indications:  This is a 62 year old woman recently diagnosed with DCIS.  After discussing risks and benefits of her different options, she elects to proceed with partial mastectomy and APBI with plans for SHIRLEY catheter placement if criteria are met.      Findings:  Clip and calcifications in specimen.      Summary:  The patient underwent wire localization by Radiology, then was taken to the operating room and anesthetized in a supine position.  She was prepped with betadine, then draped in sterile fashion.  Time out was confirmed.  I injected local anesthetic, then made a curvilinear incision in the upper inner right breast following Kraissl's lines.  The area of interest was excised widely and a medial flap of breast tissue was reapproximated to the main specimen with white vicryls.  Marking sutures were placed using black nylon.  Hemostasis was ensured and the wound was irrigated.  After confirming that the specimen contained the clip and calcifications of interest, I used 3-0 vicryls to reapproximate the anterior parenchyma to add more space between the expected catheter and skin, and then to close the deep dermal layer.  4-0 monocryl was used to close the skin and dressings were applied.  I was informed all counts were correct.      CC: NOLA Bishop MD

## 2017-10-25 NOTE — DISCHARGE INSTRUCTIONS
ACTIVITY: Rest and take it easy for the first 24 hours.  A responsible adult is recommended to remain with you during that time.  It is normal to feel sleepy.  We encourage you to not do anything that requires balance, judgment or coordination.    MILD FLU-LIKE SYMPTOMS ARE NORMAL. YOU MAY EXPERIENCE GENERALIZED MUSCLE ACHES, THROAT IRRITATION, HEADACHE AND/OR SOME NAUSEA.    FOR 24 HOURS DO NOT:  Drive, operate machinery or run household appliances.  Drink beer or alcoholic beverages.   Make important decisions or sign legal documents.    SPECIAL INSTRUCTIONS: *FOLLOW DR BA'S INSTRUCTIONS.  AMBULATE REGULARLY**    DIET: To avoid nausea, slowly advance diet as tolerated, avoiding spicy or greasy foods for the first day.  Add more substantial food to your diet according to your physician's instructions.  Babies can be fed formula or breast milk as soon as they are hungry.  INCREASE FLUIDS AND FIBER TO AVOID CONSTIPATION.    SURGICAL DRESSING/BATHING: *MAY SHOWER.  NO TUB BATHS, HOT TUBS OR SOAKING.  LEAVE STERI STRIPS ON UNTIL THEY FALL OFF**    FOLLOW-UP APPOINTMENT:  A follow-up appointment should be arranged with your doctor; call to schedule.    You should CALL YOUR PHYSICIAN if you develop:  Fever greater than 101 degrees F.  Pain not relieved by medication, or persistent nausea or vomiting.  Excessive bleeding (blood soaking through dressing) or unexpected drainage from the wound.  Extreme redness or swelling around the incision site, drainage of pus or foul smelling drainage.  Inability to urinate or empty your bladder within 8 hours.  Problems with breathing or chest pain.    You should call 911 if you develop problems with breathing or chest pain.  If you are unable to contact your doctor or surgical center, you should go to the nearest emergency room or urgent care center.    Physician's telephone #: *[phone]**    If any questions arise, call your doctor.  If your doctor is not available, please feel  free to call the Surgical Center at [phone].  The Center is open Monday through Friday from 7AM to 7PM.  You can also call the HEALTH HOTLINE open 24 hours/day, 7 days/week and speak to a nurse at [phone], or toll free at [phone].    A registered nurse may call you a few days after your surgery to see how you are doing after your procedure.    MEDICATIONS: Resume taking daily medication.  Take prescribed pain medication with food.  If no medication is prescribed, you may take non-aspirin pain medication if needed.  PAIN MEDICATION CAN BE VERY CONSTIPATING.  Take a stool softener or laxative such as senokot, pericolace, or milk of magnesia if needed.    Prescription given for *NORCO AND ZOFRAN**.  Last pain medication given at **NONE*.    If your physician has prescribed pain medication that includes Acetaminophen (Tylenol), do not take additional Acetaminophen (Tylenol) while taking the prescribed medication.    Discharge Education for patients on LUIS (Obstructive Sleep Apnea) Protocol    Prior to receiving sedation or anesthesia, we screen all patients for Obstructive Sleep Apnea.  During your screening, you were identified as having suspected, but not confirmed Obstructive Sleep Apnea(LUIS).    What is Obstructive Sleep Apnea?  Sleep apnea (AP-ne-ah) is a common disorder which involves breathing pauses that occur during sleep.  These can last from 10 seconds to a minute or longer.  Normal breathing resumes often with a loud snort or choking sound.    Sleep apnea occurs in all age groups and both genders but is more common in men and people over 40 years of age.  It has been estimated that as many as 18 million Americans have sleep apnea.  Most people who have sleep apnea don’t know they have it because it only occurs during sleep.  A family member and/or bed partner may first notice the signs of sleep apnea.  Sleep apnea is a chronic (ongoing) condition that disrupts the quality and quantity of your  sleep repeatedly throughout the night.  This often results in excessive daytime sleepiness or fatigue during the day.  It may also contribute to high blood pressure, heart problems, and complications following medications used for surgery and procedures.    To establish a definitive diagnosis, further testing from a specialist would be needed.  We recommend that you follow up with your primary care physician.    We recommend that you should be with an adult observer for at least 24 hours after your sedation/anesthesia.  If you have a CPAP machine, you should wear it during any sleep period (day or night) for the week following your procedure.  We encourage you to sleep on your side or in a sitting position, even with napping.  Lying flat on your back increases the risk of apnea and airway obstruction during your post procedure recovery period.    It is important to prevent over-sedation that could increase your risk for apnea.  Please take all pain medication as directed by your physician.  If you are not getting pain relief, please contact your physician to discuss possible approaches to relieving pain while minimizing medications that can affect your breathing and oxygen levels.

## 2017-10-30 ENCOUNTER — HOSPITAL ENCOUNTER (OUTPATIENT)
Dept: RADIATION ONCOLOGY | Facility: MEDICAL CENTER | Age: 62
End: 2017-10-30

## 2017-10-30 PROCEDURE — 77280 THER RAD SIMULAJ FIELD SMPL: CPT | Performed by: RADIOLOGY

## 2017-10-30 PROCEDURE — 77334 RADIATION TREATMENT AID(S): CPT | Performed by: RADIOLOGY

## 2017-11-01 ENCOUNTER — HOSPITAL ENCOUNTER (OUTPATIENT)
Dept: RADIATION ONCOLOGY | Facility: MEDICAL CENTER | Age: 62
End: 2017-11-30
Attending: RADIOLOGY
Payer: COMMERCIAL

## 2017-11-02 ENCOUNTER — HOSPITAL ENCOUNTER (OUTPATIENT)
Dept: RADIATION ONCOLOGY | Facility: MEDICAL CENTER | Age: 62
End: 2017-11-02

## 2017-11-02 PROCEDURE — 77263 THER RADIOLOGY TX PLNG CPLX: CPT | Performed by: RADIOLOGY

## 2017-11-02 PROCEDURE — 77470 SPECIAL RADIATION TREATMENT: CPT | Mod: 26 | Performed by: RADIOLOGY

## 2017-11-02 PROCEDURE — 77290 THER RAD SIMULAJ FIELD CPLX: CPT | Performed by: RADIOLOGY

## 2017-11-02 PROCEDURE — 77334 RADIATION TREATMENT AID(S): CPT | Mod: 26 | Performed by: RADIOLOGY

## 2017-11-02 PROCEDURE — 77332 RADIATION TREATMENT AID(S): CPT | Performed by: RADIOLOGY

## 2017-11-02 PROCEDURE — 77470 SPECIAL RADIATION TREATMENT: CPT | Performed by: RADIOLOGY

## 2017-11-02 PROCEDURE — 77290 THER RAD SIMULAJ FIELD CPLX: CPT | Mod: 26 | Performed by: RADIOLOGY

## 2017-11-03 PROCEDURE — 77300 RADIATION THERAPY DOSE PLAN: CPT | Mod: 26 | Performed by: RADIOLOGY

## 2017-11-03 PROCEDURE — 77295 3-D RADIOTHERAPY PLAN: CPT | Mod: 26 | Performed by: RADIOLOGY

## 2017-11-03 PROCEDURE — 77300 RADIATION THERAPY DOSE PLAN: CPT | Performed by: RADIOLOGY

## 2017-11-03 PROCEDURE — 77295 3-D RADIOTHERAPY PLAN: CPT | Performed by: RADIOLOGY

## 2017-11-06 ENCOUNTER — HOSPITAL ENCOUNTER (OUTPATIENT)
Dept: RADIATION ONCOLOGY | Facility: MEDICAL CENTER | Age: 62
End: 2017-11-06

## 2017-11-06 PROCEDURE — 77280 THER RAD SIMULAJ FIELD SMPL: CPT | Mod: 26,XE | Performed by: RADIOLOGY

## 2017-11-06 PROCEDURE — 77280 THER RAD SIMULAJ FIELD SMPL: CPT | Performed by: RADIOLOGY

## 2017-11-06 PROCEDURE — 77770 HDR RDNCL NTRSTL/ICAV BRCHTX: CPT | Mod: 26,XE | Performed by: RADIOLOGY

## 2017-11-06 PROCEDURE — C1717 BRACHYTX, NON-STR,HDR IR-192: HCPCS | Performed by: RADIOLOGY

## 2017-11-06 PROCEDURE — 77770 HDR RDNCL NTRSTL/ICAV BRCHTX: CPT | Performed by: RADIOLOGY

## 2017-11-06 PROCEDURE — 77370 RADIATION PHYSICS CONSULT: CPT | Performed by: RADIOLOGY

## 2017-11-07 ENCOUNTER — HOSPITAL ENCOUNTER (OUTPATIENT)
Dept: RADIATION ONCOLOGY | Facility: MEDICAL CENTER | Age: 62
End: 2017-11-07

## 2017-11-07 PROCEDURE — 77280 THER RAD SIMULAJ FIELD SMPL: CPT | Mod: 26,XE | Performed by: RADIOLOGY

## 2017-11-07 PROCEDURE — C1717 BRACHYTX, NON-STR,HDR IR-192: HCPCS | Performed by: RADIOLOGY

## 2017-11-07 PROCEDURE — 77280 THER RAD SIMULAJ FIELD SMPL: CPT | Performed by: RADIOLOGY

## 2017-11-07 PROCEDURE — 77336 RADIATION PHYSICS CONSULT: CPT | Performed by: RADIOLOGY

## 2017-11-07 PROCEDURE — 77770 HDR RDNCL NTRSTL/ICAV BRCHTX: CPT | Mod: 26,XE | Performed by: RADIOLOGY

## 2017-11-07 PROCEDURE — 77770 HDR RDNCL NTRSTL/ICAV BRCHTX: CPT | Performed by: RADIOLOGY

## 2017-11-08 ENCOUNTER — HOSPITAL ENCOUNTER (OUTPATIENT)
Dept: RADIATION ONCOLOGY | Facility: MEDICAL CENTER | Age: 62
End: 2017-11-08

## 2017-11-08 PROCEDURE — 77770 HDR RDNCL NTRSTL/ICAV BRCHTX: CPT | Performed by: RADIOLOGY

## 2017-11-08 PROCEDURE — 77280 THER RAD SIMULAJ FIELD SMPL: CPT | Mod: 26,XE | Performed by: RADIOLOGY

## 2017-11-08 PROCEDURE — 77770 HDR RDNCL NTRSTL/ICAV BRCHTX: CPT | Mod: 26,XE | Performed by: RADIOLOGY

## 2017-11-08 PROCEDURE — 77280 THER RAD SIMULAJ FIELD SMPL: CPT | Performed by: RADIOLOGY

## 2017-11-08 PROCEDURE — C1717 BRACHYTX, NON-STR,HDR IR-192: HCPCS | Performed by: RADIOLOGY

## 2017-11-09 ENCOUNTER — HOSPITAL ENCOUNTER (OUTPATIENT)
Dept: RADIATION ONCOLOGY | Facility: MEDICAL CENTER | Age: 62
End: 2017-11-09

## 2017-11-09 PROCEDURE — 77770 HDR RDNCL NTRSTL/ICAV BRCHTX: CPT | Mod: 26,XE | Performed by: RADIOLOGY

## 2017-11-09 PROCEDURE — 77280 THER RAD SIMULAJ FIELD SMPL: CPT | Mod: 26,XE | Performed by: RADIOLOGY

## 2017-11-09 PROCEDURE — C1717 BRACHYTX, NON-STR,HDR IR-192: HCPCS | Mod: XE | Performed by: RADIOLOGY

## 2017-11-09 PROCEDURE — 77770 HDR RDNCL NTRSTL/ICAV BRCHTX: CPT | Mod: XE | Performed by: RADIOLOGY

## 2017-11-09 PROCEDURE — 77336 RADIATION PHYSICS CONSULT: CPT | Performed by: RADIOLOGY

## 2017-11-09 PROCEDURE — 77280 THER RAD SIMULAJ FIELD SMPL: CPT | Performed by: RADIOLOGY

## 2017-11-10 ENCOUNTER — HOSPITAL ENCOUNTER (OUTPATIENT)
Dept: RADIATION ONCOLOGY | Facility: MEDICAL CENTER | Age: 62
End: 2017-11-10

## 2017-11-10 PROCEDURE — C1717 BRACHYTX, NON-STR,HDR IR-192: HCPCS | Mod: XE | Performed by: RADIOLOGY

## 2017-11-10 PROCEDURE — 77770 HDR RDNCL NTRSTL/ICAV BRCHTX: CPT | Mod: 26 | Performed by: RADIOLOGY

## 2017-11-10 PROCEDURE — 77770 HDR RDNCL NTRSTL/ICAV BRCHTX: CPT | Mod: XE | Performed by: RADIOLOGY

## 2017-11-10 PROCEDURE — C1717 BRACHYTX, NON-STR,HDR IR-192: HCPCS | Performed by: RADIOLOGY

## 2017-11-10 PROCEDURE — 77770 HDR RDNCL NTRSTL/ICAV BRCHTX: CPT | Mod: 26,XE | Performed by: RADIOLOGY

## 2017-11-10 PROCEDURE — 77770 HDR RDNCL NTRSTL/ICAV BRCHTX: CPT | Performed by: RADIOLOGY

## 2017-11-10 PROCEDURE — 77280 THER RAD SIMULAJ FIELD SMPL: CPT | Mod: 26 | Performed by: RADIOLOGY

## 2017-11-10 PROCEDURE — 77280 THER RAD SIMULAJ FIELD SMPL: CPT | Mod: 59 | Performed by: RADIOLOGY

## 2017-11-10 PROCEDURE — 77280 THER RAD SIMULAJ FIELD SMPL: CPT | Mod: 26,XE | Performed by: RADIOLOGY

## 2017-11-10 PROCEDURE — 77280 THER RAD SIMULAJ FIELD SMPL: CPT | Performed by: RADIOLOGY

## 2017-12-13 ENCOUNTER — PATIENT MESSAGE (OUTPATIENT)
Dept: SLEEP MEDICINE | Facility: MEDICAL CENTER | Age: 62
End: 2017-12-13

## 2017-12-14 NOTE — TELEPHONE ENCOUNTER
Received: Today   Message Contents   KRISTAL Reis, Hi Ass't              Please call patient and see if she can try a different mask with her DME company. They may have one for more sensative skin?          Spoke to Paige, they have mask barriers she can try. Pt aware, she will let us know if this doesn't help./ap

## 2017-12-14 NOTE — TELEPHONE ENCOUNTER
Non-Urgent Medical Question   Received: Today   Message Contents   [facility]   Phone Number: [phone]             ISABEL Rowland,     I used the CPAP last night with a brand new mask for 8 hours and woke up this morning with burning face and returning rash.  Is it possible I have developed an allergy to the material in the mask?     Thank You,   Kori

## 2017-12-18 ENCOUNTER — HOSPITAL ENCOUNTER (OUTPATIENT)
Dept: RADIATION ONCOLOGY | Facility: MEDICAL CENTER | Age: 62
End: 2017-12-31
Attending: RADIOLOGY
Payer: COMMERCIAL

## 2017-12-18 ENCOUNTER — OFFICE VISIT (OUTPATIENT)
Dept: MEDICAL GROUP | Facility: PHYSICIAN GROUP | Age: 62
End: 2017-12-18
Payer: COMMERCIAL

## 2017-12-18 ENCOUNTER — PATIENT OUTREACH (OUTPATIENT)
Dept: OTHER | Facility: MEDICAL CENTER | Age: 62
End: 2017-12-18

## 2017-12-18 VITALS
WEIGHT: 272 LBS | RESPIRATION RATE: 24 BRPM | HEART RATE: 94 BPM | DIASTOLIC BLOOD PRESSURE: 84 MMHG | HEIGHT: 63 IN | SYSTOLIC BLOOD PRESSURE: 126 MMHG | OXYGEN SATURATION: 92 % | TEMPERATURE: 97.6 F | BODY MASS INDEX: 48.2 KG/M2

## 2017-12-18 VITALS
BODY MASS INDEX: 49.75 KG/M2 | WEIGHT: 272 LBS | HEART RATE: 83 BPM | OXYGEN SATURATION: 91 % | DIASTOLIC BLOOD PRESSURE: 67 MMHG | TEMPERATURE: 98.4 F | SYSTOLIC BLOOD PRESSURE: 129 MMHG

## 2017-12-18 DIAGNOSIS — R79.89 ABNORMAL TSH: ICD-10-CM

## 2017-12-18 DIAGNOSIS — Z23 NEED FOR PNEUMOCOCCAL VACCINATION: ICD-10-CM

## 2017-12-18 DIAGNOSIS — I10 ESSENTIAL HYPERTENSION: ICD-10-CM

## 2017-12-18 DIAGNOSIS — R06.02 SHORTNESS OF BREATH: ICD-10-CM

## 2017-12-18 DIAGNOSIS — Z23 NEED FOR SHINGLES VACCINE: ICD-10-CM

## 2017-12-18 DIAGNOSIS — Z23 NEED FOR INFLUENZA VACCINATION: ICD-10-CM

## 2017-12-18 DIAGNOSIS — D05.11 INTRADUCTAL CARCINOMA IN SITU OF RIGHT BREAST: ICD-10-CM

## 2017-12-18 DIAGNOSIS — E66.01 MORBID OBESITY WITH BMI OF 45.0-49.9, ADULT (HCC): ICD-10-CM

## 2017-12-18 DIAGNOSIS — R21 RASH: ICD-10-CM

## 2017-12-18 DIAGNOSIS — J43.9 PULMONARY EMPHYSEMA, UNSPECIFIED EMPHYSEMA TYPE (HCC): ICD-10-CM

## 2017-12-18 DIAGNOSIS — Z23 NEED FOR TDAP VACCINATION: ICD-10-CM

## 2017-12-18 PROCEDURE — 90732 PPSV23 VACC 2 YRS+ SUBQ/IM: CPT | Performed by: NURSE PRACTITIONER

## 2017-12-18 PROCEDURE — 90715 TDAP VACCINE 7 YRS/> IM: CPT | Performed by: NURSE PRACTITIONER

## 2017-12-18 PROCEDURE — 90686 IIV4 VACC NO PRSV 0.5 ML IM: CPT | Performed by: NURSE PRACTITIONER

## 2017-12-18 PROCEDURE — 90471 IMMUNIZATION ADMIN: CPT | Performed by: NURSE PRACTITIONER

## 2017-12-18 PROCEDURE — 99214 OFFICE O/P EST MOD 30 MIN: CPT | Mod: 25 | Performed by: NURSE PRACTITIONER

## 2017-12-18 PROCEDURE — 99212 OFFICE O/P EST SF 10 MIN: CPT | Performed by: RADIOLOGY

## 2017-12-18 PROCEDURE — 90472 IMMUNIZATION ADMIN EACH ADD: CPT | Performed by: NURSE PRACTITIONER

## 2017-12-18 RX ORDER — TRIAMCINOLONE ACETONIDE 1 MG/G
CREAM TOPICAL
Qty: 30 G | Refills: 1 | Status: SHIPPED | OUTPATIENT
Start: 2017-12-18 | End: 2019-09-03

## 2017-12-18 RX ORDER — ALBUTEROL SULFATE 90 UG/1
2 AEROSOL, METERED RESPIRATORY (INHALATION) EVERY 6 HOURS PRN
Qty: 1 INHALER | Refills: 3 | Status: SHIPPED | OUTPATIENT
Start: 2017-12-18 | End: 2018-07-17 | Stop reason: SDUPTHER

## 2017-12-18 ASSESSMENT — PAIN SCALES - GENERAL: PAINLEVEL: NO PAIN

## 2017-12-18 NOTE — PROGRESS NOTES
Met with patient to review breast cancer treatment summary and survivorship care plan.     Pt denied distress, needs or concerns at this time. She states she does not want to split her follow--up amongst many providers, plan will be to do follow up with Dr. Boateng, per pt.   General [facility] breast cancer follow up guidelines reviewed with patient. Patient referred to treating physician for individualized follow up schedule and recommendations / questions.    Scanned to EPIC

## 2017-12-18 NOTE — NON-PROVIDER
Patient was seen today in clinic with Dr. Boateng for Follow Up.  Vitals signs and weight were obtained and pain assessment was completed.  Allergies and medications were reviewed with the patient.  Toxicities of treatment assessed.     Vitals/Pain:  Vitals:    12/18/17 0749   BP: 129/67   Pulse: 83   Temp: 36.9 °C (98.4 °F)   SpO2: 91%   Weight: 123.4 kg (272 lb)   Pain Score: No pain        Allergies:   Bee venom    Current Medications:  Current Outpatient Prescriptions   Medication Sig Dispense Refill   • lisinopril-hydrochlorothiazide (PRINZIDE, ZESTORETIC) 10-12.5 MG per tablet Take 1 Tab by mouth every day. 90 Tab 2   • levothyroxine (SYNTHROID) 50 MCG Tab Take 1 Tab by mouth Every morning on an empty stomach. 90 Tab 3   • albuterol 108 (90 BASE) MCG/ACT Aero Soln inhalation aerosol Inhale 2 Puffs by mouth every 6 hours as needed for Shortness of Breath. 1 Inhaler 3   • budesonide-formoterol (SYMBICORT) 160-4.5 MCG/ACT Aerosol Inhale 2 Puffs by mouth 2 Times a Day. Use spacer. Rinse mouth after each use. 1 Inhaler 6   • MELATONIN PO Take 1 tablet by mouth at bedtime as needed.       No current facility-administered medications for this encounter.          PCP:  Dean Castillo, Med Ass't

## 2017-12-18 NOTE — PROGRESS NOTES
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      INTERVAL HISTORY: I had the pleasure of seeing Ms. Xie today in follow up for her breast cancer.  She states that the insertion site for her catheter has healed. She does not have any pain or discomfort in the implant site. She does still have subtle redness overlying the treatment area. States this has been continually decreasing in character. She did a great deal of research on taken an aromatase inhibitor or not for ductal carcinoma in situ and has elected to not proceed with aromatase inhibitor. She is continuing to pursue lifestyle changes to minimize her weight.    PHYSICAL EXAM:    Vitals:    12/18/17 0749   BP: 129/67   Pulse: 83   Temp: 36.9 °C (98.4 °F)   SpO2: 91%   Weight: 123.4 kg (272 lb)   Pain Score: No pain      1= Restricted in physically strenuous activity, but ambulatory and able to carry out work of a light sedentary nature, e.g., light housework, office work.        GENERAL: No apparent distress.  HEENT:  Pupils are equal, round, and reactive to light.  Extraocular muscles   are intact. Sclerae nonicteric.  Conjunctivae pink.  Oral cavity, tongue   protrudes midline.   NECK:  Supple without evidence of thyromegaly.  NODES:  No peripheral adenopathy of the neck, supraclavicular fossa or axillae   bilaterally.  LUNGS:  Clear to ascultation bilaterally   HEART:  Regular rate and rhythm.  No murmur appreciated  BREAST: No suspicious lesions found in either breast or axilla patient has subtle redness above the treatment site.  ABDOMEN:  Soft. No evidence of hepatosplenomegaly.  Positive bowel sounds.  EXTREMITIES:  Without Edema.  NEUROLOGIC:  Cranial nerves II  through XII were intact. 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 Patient questions the necessity to see multiple doctors in follow-up for her condition. I did discuss with the patient the data surrounding a single survivorship coordinator. Currently our primary care colleagues feel challenged with the right tools to ensure they are following the patient's to the right national guidelines especially in the 1st 5 years of survivorship. Therefore we typically have some 1 on the oncology team helping with urination of care during the 1st 5 year time period. A single provider however could be responsible for that care. If she is going to follow with Dr. Mansfield I would be happy to follow her on an as-needed basis. Or she would prefer to be followed in radiation oncology I am comfortable with keeping Dr. Mansfield informed. Patient will have her mammogram in August. I will plan on seeing her after her mammogram. She will be seeing Dr. Mansfield in June and discuss this possibility with her as well. I discussed with the patient she will require an annual mammogram as well as annual clinical breast exam regardless of who she follows with.    If patient has any questions or concerns, she should feel free to contact me.

## 2017-12-19 NOTE — ASSESSMENT & PLAN NOTE
This is a chronic condition, uncontrolled. Patient's weight is 272 pounds, BMI is 48.93. She is up from 249 pounds in June 2017. She does continue to struggle with weight loss especially with the right kinds of foods to eat. She has been trying to eat low carbohydrates, but continues to eat rice and bread. She states she is eating the appropriate portions but she is not aware of how many calories she eats in a day. She is going to work on this. She states she would like to try this for the next 3 months and if she does not see improvement she will like to be referred to [facility].

## 2017-12-19 NOTE — ASSESSMENT & PLAN NOTE
Patient recently diagnosed with intraductal carcinoma in situ of the right breast and has been followed by surgeon as well as oncologist. She declined taking the recommended methotrexate after the research she did on her own online. Surgeon and oncologist are aware and recommend close follow-up. However patient does not think that she needs to be followed closely. I did discuss with her the risks of not following up with surgeon and oncology at least every 3-6 months because she is not taking the recommended medication. She states she verbalizes understanding especially with regard to her risk.

## 2017-12-19 NOTE — PROGRESS NOTES
Chief Complaint   Patient presents with   • Follow-Up     breast cancer, SOB   • Immunizations     pneumo, flu, shingles, tdap vacc         This is a 62 y.o.female patient that presents today with the following:Follow-up visit    Morbid obesity with BMI of 45.0-49.9, adult ([facility])  This is a chronic condition, uncontrolled. Patient's weight is 272 pounds, BMI is 48.93. She is up from 249 pounds in June 2017. She does continue to struggle with weight loss especially with the right kinds of foods to eat. She has been trying to eat low carbohydrates, but continues to eat rice and bread. She states she is eating the appropriate portions but she is not aware of how many calories she eats in a day. She is going to work on this. She states she would like to try this for the next 3 months and if she does not see improvement she will like to be referred to the [facility] improvement program.    Pulmonary emphysema (CMS-[facility])  This is a chronic condition, stable and fairly well-controlled on medications. She does need refills on her albuterol inhaler, this has been called in for her. She has been followed by pulmonology for this as well. Her O2 saturations on room air today are 92% and she does not report any recent exacerbations.    Intraductal carcinoma in situ of right breast  Patient recently diagnosed with intraductal carcinoma in situ of the right breast and has been followed by surgeon as well as oncologist. She declined taking the recommended methotrexate after the research she did on her own online. Surgeon and oncologist are aware and recommend close follow-up. However patient does not think that she needs to be followed closely. I did discuss with her the risks of not following up with surgeon and oncology at least every 3-6 months because she is not taking the recommended medication. She states she verbalizes understanding especially with regard to her risk.      No visits with results within 1 Month(s) from this  visit.   Latest known visit with results is:   Orders Only on 10/20/2017   Component Date Value   • Report 10/20/2017                      Value:[facility]    Test Date:  2017-10-20  Pt Name:    JEREMY MATHEW                   Department: [facility]  MRN:        [id number]                      Room:  Gender:     F                            Technician: [facility]  :        [id number]                   Requested By:GINO BA  Order #:    [id number]                    Reading MD: Conner Marcus MD    Measurements  Intervals                                Axis  Rate:       91                           P:          61  TN:         148                          QRS:        -48  QRSD:       102                          T:          65  QT:         392  QTc:        483    Interpretive Statements  SINUS RHYTHM  SINUS PAUSE/ARREST WITH ATRIAL ESCAPE  LEFT ANTERIOR FASCICULAR BLOCK  ABNRM R PROG, CONSIDER ASMI OR LEAD PLACEMENT  No previous ECG available for comparison    Electronically Signed On 10- 16:28:28 PDT by Conner Marcus MD     • WBC 10/20/2017 8.0    • RBC 10/20/2017 4.36    • Hemoglobin 10/20/2017 13.6    • Hematocrit 10/20/2017 41.1    • MCV 10/20/2017 94.3    • MCH 10/20/2017 31.2    • MCHC 10/20/2017 33.1*   • RDW 10/20/2017 41.6    • Platelet Count 10/20/2017 205    • MPV 10/20/2017 11.4    • Sodium 10/20/2017 139    • Potassium 10/20/2017 4.0    • Chloride 10/20/2017 108    • Co2 10/20/2017 25    • Glucose 10/20/2017 75    • Bun 10/20/2017 20    • Creatinine 10/20/2017 0.57    • Calcium 10/20/2017 9.8    • Anion Gap 10/20/2017 6.0    • GFR If  10/20/2017 >60    • GFR If Non  Ameri* 10/20/2017 >60          clinical course has been stable    Past Medical History:   Diagnosis Date   • Asthma    • Bowel habit changes    • Breast cancer (CMS-HCC)        • Breath shortness    • Cancer (CMS-HCC)     Breast   • Chickenpox    • Colon polyps        • Congestive heart failure (CMS-HCC)     • Dental disorder     Full upper/lower dentures.   • Emphysema of lung (CMS-HCC)    • Essential hypertension 3/31/2017   • Heart burn    • Indigestion    • Mumps    • Obesity    • Sleep apnea     CPAP use.   • Tobacco abuse        Past Surgical History:   Procedure Laterality Date   • MASTECTOMY Right 10/24/2017    Procedure: MASTECTOMY- PARTIAL, WIRE LOCALIZED;  Surgeon: Marina Mansfield M.D.;  Location: SURGERY SAME DAY [facility];  Service: General   • COLONOSCOPY         Family History   Problem Relation Age of Onset   • No Known Problems Daughter    • Cancer Sister      cervical   • Cancer Sister      renal   • Cancer Maternal Uncle      lung   • Cancer Maternal Uncle      melanoma   • Sleep Apnea Neg Hx        Bee venom    Current Outpatient Prescriptions Ordered in [facility]   Medication Sig Dispense Refill   • NON SPECIFIED Please administer Zostivax 1 Each 0   • triamcinolone acetonide (KENALOG) 0.1 % Cream Twice daily for 2 weeks at a time 30 g 1   • albuterol 108 (90 Base) MCG/ACT Aero Soln inhalation aerosol Inhale 2 Puffs by mouth every 6 hours as needed for Shortness of Breath. 1 Inhaler 3   • lisinopril-hydrochlorothiazide (PRINZIDE, ZESTORETIC) 10-12.5 MG per tablet Take 1 Tab by mouth every day. 90 Tab 2   • levothyroxine (SYNTHROID) 50 MCG Tab Take 1 Tab by mouth Every morning on an empty stomach. 90 Tab 3   • MELATONIN PO Take 1 tablet by mouth at bedtime as needed.     • budesonide-formoterol (SYMBICORT) 160-4.5 MCG/ACT Aerosol Inhale 2 Puffs by mouth 2 Times a Day. Use spacer. Rinse mouth after each use. 1 Inhaler 6     No current [facility]-ordered facility-administered medications on file.        Constitutional ROS: No unexpected change in weight, No weakness, No unexplained fevers, sweats, or chills  Pulmonary ROS: Positive per history of present illness  Cardiovascular ROS: No chest pain, No edema, No palpitations, Positive for hypertension, controlled  Gastrointestinal ROS: No abdominal pain, No  "nausea, vomiting, diarrhea, or constipation  Breast ROS: Positive per history of present illness  Musculoskeletal/Extremities ROS: No clubbing, No peripheral edema, No pain, redness or swelling on the joints  Neurologic ROS: Normal development, No seizures, No weakness  Endocrine ROS: Positive per history of present illness    Physical exam:  /84   Pulse 94   Temp 36.4 °C (97.6 °F)   Resp (!) 24   Ht 1.588 m (5' 2.52\")   Wt 123.4 kg (272 lb)   SpO2 92%   BMI 48.93 kg/m²   General Appearance: Older female, alert, no distress, morbidly obese,  well-groomed  Skin: Skin color, texture, turgor normal. No rashes or lesions.  Lungs: negative findings: normal respiratory rate and rhythm, lungs clear to auscultation  Heart: negative. RRR without murmur, gallop, or rubs.  No ectopy.  Abdomen: Abdomen soft, non-tender. BS normal. No masses,  No organomegaly  Musculoskeletal: negative findings: ROM of all joints is normal, no evidence of joint instability, strength normal, no deformities present  Neurologic: intact, oriented, mood appropriate, judgment intact. Cranial nerves II through XII grossly intact    Medical decision making/discussion: Patient will get immunizations including TDaP, pneumonia 23 and flu . She has been given prescription for Zostavax to be done at pharmacy. She is going to continue working on healthy diet, regular physical activity as able and continued efforts towards weight loss. If she does not see improvement and her weight over the next 6 months, she will request referral to health improvement program. Her medications have been refilled. She was encouraged to continue with care per her specialists including surgeon and oncologist. We will see her back in 6 months with labs before visit.    Mallorie was seen today for follow-up and immunizations.    Diagnoses and all orders for this visit:    Morbid obesity with BMI of 45.0-49.9, adult (HCC)  -     Patient identified as having weight " management issue.  Appropriate orders and counseling given.    Need for Tdap vaccination  -     TDAP VACCINE =>6YO IM    Need for pneumococcal vaccination  -     PneumoVax PPV23 =>1yo    Need for influenza vaccination  -     INFLUENZA VACCINE QUAD INJ >3Y(PF)    Need for shingles vaccine  -     NON SPECIFIED; Please administer Zostivax    Rash  -     triamcinolone acetonide (KENALOG) 0.1 % Cream; Twice daily for 2 weeks at a time    Shortness of breath  -     albuterol 108 (90 Base) MCG/ACT Aero Soln inhalation aerosol; Inhale 2 Puffs by mouth every 6 hours as needed for Shortness of Breath.    Pulmonary emphysema, unspecified emphysema type (CMS-HCC)  -     albuterol 108 (90 Base) MCG/ACT Aero Soln inhalation aerosol; Inhale 2 Puffs by mouth every 6 hours as needed for Shortness of Breath.    Intraductal carcinoma in situ of right breast    Essential hypertension  -     COMP METABOLIC PANEL; Future  -     LIPID PROFILE; Future    Abnormal TSH  -     TSH WITH REFLEX TO FT4; Future          Please note that this dictation was created using voice recognition software. I have made every reasonable attempt to correct obvious errors, but I expect that there are errors of grammar and possibly content that I did not discover before finalizing the note.

## 2017-12-19 NOTE — ASSESSMENT & PLAN NOTE
This is a chronic condition, stable and fairly well-controlled on medications. She does need refills on her albuterol inhaler, this has been called in for her. She has been followed by pulmonology for this as well. Her O2 saturations on room air today are 92% and she does not report any recent exacerbations.

## 2018-01-04 DIAGNOSIS — J43.9 PULMONARY EMPHYSEMA, UNSPECIFIED EMPHYSEMA TYPE (HCC): ICD-10-CM

## 2018-01-04 RX ORDER — BUDESONIDE AND FORMOTEROL FUMARATE DIHYDRATE 160; 4.5 UG/1; UG/1
2 AEROSOL RESPIRATORY (INHALATION) 2 TIMES DAILY
Qty: 1 INHALER | Refills: 6 | Status: SHIPPED | OUTPATIENT
Start: 2018-01-04 | End: 2018-04-23 | Stop reason: SDUPTHER

## 2018-04-23 ENCOUNTER — HOSPITAL ENCOUNTER (OUTPATIENT)
Dept: LAB | Facility: MEDICAL CENTER | Age: 63
End: 2018-04-23
Attending: NURSE PRACTITIONER
Payer: COMMERCIAL

## 2018-04-23 ENCOUNTER — HOSPITAL ENCOUNTER (OUTPATIENT)
Dept: RADIOLOGY | Facility: MEDICAL CENTER | Age: 63
End: 2018-04-23
Attending: NURSE PRACTITIONER
Payer: COMMERCIAL

## 2018-04-23 ENCOUNTER — OFFICE VISIT (OUTPATIENT)
Dept: PULMONOLOGY | Facility: HOSPICE | Age: 63
End: 2018-04-23
Payer: COMMERCIAL

## 2018-04-23 VITALS
DIASTOLIC BLOOD PRESSURE: 72 MMHG | RESPIRATION RATE: 16 BRPM | SYSTOLIC BLOOD PRESSURE: 132 MMHG | HEIGHT: 62 IN | TEMPERATURE: 98.6 F | OXYGEN SATURATION: 91 % | BODY MASS INDEX: 51.27 KG/M2 | HEART RATE: 69 BPM | WEIGHT: 278.6 LBS

## 2018-04-23 DIAGNOSIS — J43.9 PULMONARY EMPHYSEMA, UNSPECIFIED EMPHYSEMA TYPE (HCC): ICD-10-CM

## 2018-04-23 DIAGNOSIS — R06.09 DYSPNEA ON EXERTION: ICD-10-CM

## 2018-04-23 DIAGNOSIS — Z01.812 ENCOUNTER FOR PREPROCEDURAL LABORATORY EXAMINATION: ICD-10-CM

## 2018-04-23 DIAGNOSIS — I10 ESSENTIAL HYPERTENSION: ICD-10-CM

## 2018-04-23 DIAGNOSIS — R09.02 HYPOXIA: ICD-10-CM

## 2018-04-23 DIAGNOSIS — R79.89 ABNORMAL TSH: ICD-10-CM

## 2018-04-23 LAB
ALBUMIN SERPL BCP-MCNC: 4.4 G/DL (ref 3.2–4.9)
ALBUMIN/GLOB SERPL: 1.5 G/DL
ALP SERPL-CCNC: 78 U/L (ref 30–99)
ALT SERPL-CCNC: 41 U/L (ref 2–50)
ANION GAP SERPL CALC-SCNC: 9 MMOL/L (ref 0–11.9)
AST SERPL-CCNC: 24 U/L (ref 12–45)
BILIRUB SERPL-MCNC: 0.5 MG/DL (ref 0.1–1.5)
BUN SERPL-MCNC: 19 MG/DL (ref 8–22)
BUN SERPL-MCNC: 19 MG/DL (ref 8–22)
CALCIUM SERPL-MCNC: 11.1 MG/DL (ref 8.5–10.5)
CHLORIDE SERPL-SCNC: 101 MMOL/L (ref 96–112)
CHOLEST SERPL-MCNC: 251 MG/DL (ref 100–199)
CO2 SERPL-SCNC: 30 MMOL/L (ref 20–33)
CREAT SERPL-MCNC: 0.62 MG/DL (ref 0.5–1.4)
CREAT SERPL-MCNC: 0.65 MG/DL (ref 0.5–1.4)
GLOBULIN SER CALC-MCNC: 3 G/DL (ref 1.9–3.5)
GLUCOSE SERPL-MCNC: 86 MG/DL (ref 65–99)
HDLC SERPL-MCNC: 53 MG/DL
LDLC SERPL CALC-MCNC: 158 MG/DL
POTASSIUM SERPL-SCNC: 4.3 MMOL/L (ref 3.6–5.5)
PROT SERPL-MCNC: 7.4 G/DL (ref 6–8.2)
SODIUM SERPL-SCNC: 140 MMOL/L (ref 135–145)
TRIGL SERPL-MCNC: 200 MG/DL (ref 0–149)
TSH SERPL DL<=0.005 MIU/L-ACNC: 4.44 UIU/ML (ref 0.38–5.33)

## 2018-04-23 PROCEDURE — 84520 ASSAY OF UREA NITROGEN: CPT

## 2018-04-23 PROCEDURE — 71275 CT ANGIOGRAPHY CHEST: CPT

## 2018-04-23 PROCEDURE — 36415 COLL VENOUS BLD VENIPUNCTURE: CPT

## 2018-04-23 PROCEDURE — 80061 LIPID PANEL: CPT

## 2018-04-23 PROCEDURE — 80053 COMPREHEN METABOLIC PANEL: CPT

## 2018-04-23 PROCEDURE — 700117 HCHG RX CONTRAST REV CODE 255: Performed by: NURSE PRACTITIONER

## 2018-04-23 PROCEDURE — 84443 ASSAY THYROID STIM HORMONE: CPT

## 2018-04-23 PROCEDURE — 82565 ASSAY OF CREATININE: CPT

## 2018-04-23 PROCEDURE — 99214 OFFICE O/P EST MOD 30 MIN: CPT | Performed by: NURSE PRACTITIONER

## 2018-04-23 RX ORDER — BUDESONIDE AND FORMOTEROL FUMARATE DIHYDRATE 160; 4.5 UG/1; UG/1
2 AEROSOL RESPIRATORY (INHALATION) 2 TIMES DAILY
Qty: 1 INHALER | Refills: 6 | Status: SHIPPED | OUTPATIENT
Start: 2018-04-23 | End: 2018-05-08

## 2018-04-23 RX ORDER — PREDNISONE 10 MG/1
TABLET ORAL
Qty: 18 TAB | Refills: 0 | Status: SHIPPED | OUTPATIENT
Start: 2018-04-23 | End: 2018-05-08

## 2018-04-23 RX ADMIN — IOHEXOL 100 ML: 350 INJECTION, SOLUTION INTRAVENOUS at 18:04

## 2018-04-23 NOTE — PROGRESS NOTES
Chief Complaint   Patient presents with   • Follow-Up     LUIS         HPI: This patient is a 62 y.o. female, who presents for follow-up shortness of breath. She is typically seen for sleep apnea. She was last seen October 20, 2017.    Medical history includes HTN, hypothyroid, obesity BMI 49, breast cancer S/P lumpectomy and radiation therapy. She follows with oncology. She has declined any type of medications for her breast cancer.    Patient is a former smoker, 47 pack year history quit December 2016. Former PFTs indicate an FEV1 of 1.42 L 61% predicted, DLCO 147% predicted with positive bronchodilator response, indicating asthmatic component. She is been using Symbicort 160/4.5, feels this works best for her. She has albuterol to use as needed. She's been noncompliant with daytime O2. Over the past 2 weeks she's noticed significant changes in her breathing. She is very dyspneic with minimal exertion. She reports her oxygen saturations have been dropping into the 70s despite wearing her oxygen. Denies cough or wheeze. She denies any infectious symptoms. She denies chest pain or pressure, lower extremity edema or pain. No evidence of decompensated heart failure on exam.    She is a history of LUIS. Please see note dated 10/20/2017 for full details.    Past Medical History:   Diagnosis Date   • Asthma    • Bowel habit changes    • Breast cancer (CMS-HCC)     2017   • Breath shortness    • Cancer (CMS-HCC)     Breast   • Chickenpox    • Colon polyps     2017   • Congestive heart failure (CMS-HCC)    • Dental disorder     Full upper/lower dentures.   • Emphysema of lung (CMS-HCC)    • Essential hypertension 3/31/2017   • Heart burn    • Indigestion    • Mumps    • Obesity    • Sleep apnea     CPAP use.   • Tobacco abuse        Social History   Substance Use Topics   • Smoking status: Former Smoker     Packs/day: 1.00     Years: 47.00     Types: Cigarettes     Quit date: 12/2/2016   • Smokeless tobacco: Never Used   •  "Alcohol use No       Family History   Problem Relation Age of Onset   • No Known Problems Daughter    • Cancer Sister      cervical   • Cancer Sister      renal   • Cancer Maternal Uncle      lung   • Cancer Maternal Uncle      melanoma   • Sleep Apnea Neg Hx        Current medications as of today   Current Outpatient Prescriptions   Medication Sig Dispense Refill   • Multiple Vitamins-Minerals (MULTIVITAMIN ADULT PO) Take  by mouth.     • budesonide-formoterol (SYMBICORT) 160-4.5 MCG/ACT Aerosol Inhale 2 Puffs by mouth 2 Times a Day. Use spacer. Rinse mouth after each use. 1 Inhaler 6   • predniSONE (DELTASONE) 10 MG Tab Take 30mg x 3 days, then take 20mg x 3 days, then take 10mg x 3 days, with food, then discontinue. 18 Tab 0   • albuterol 108 (90 Base) MCG/ACT Aero Soln inhalation aerosol Inhale 2 Puffs by mouth every 6 hours as needed for Shortness of Breath. 1 Inhaler 3   • lisinopril-hydrochlorothiazide (PRINZIDE, ZESTORETIC) 10-12.5 MG per tablet Take 1 Tab by mouth every day. 90 Tab 2   • levothyroxine (SYNTHROID) 50 MCG Tab Take 1 Tab by mouth Every morning on an empty stomach. 90 Tab 3   • MELATONIN PO Take 1 tablet by mouth at bedtime as needed.     • NON SPECIFIED Please administer Zostivax 1 Each 0   • triamcinolone acetonide (KENALOG) 0.1 % Cream Twice daily for 2 weeks at a time 30 g 1     No current facility-administered medications for this visit.        Allergies: Bee venom    Blood pressure 132/72, pulse 69, temperature 37 °C (98.6 °F), resp. rate 16, height 1.575 m (5' 2\"), weight (!) 126.4 kg (278 lb 9.6 oz), SpO2 91 %.      ROS: As per HPI and otherwise negative if not stated.    Physical exam:   Constitutional: Morbid obesity, in no acute distress  Eyes: PERRL  Mouth/Throat: Oropharynx is moist, clear, no lesions  Neck: supple, trachea midline  Respiratory: no intercostal retractions or accessory muscle use   Lungs auscultation: Diminished bilateral bases, otherwise clear good air movement, no " evidence of wheeze or rhonchi  Cardiovascular: Regular rate rhythm no murmurs, rubs or gallops  Musculoskeletal:  no clubbing or cyanosis, unsteady gait  Skin: No rashes or lesions noted on exposed skin  Neuro: No focal deficit noted  Psychiatric: Oriented to time, person and place.     Diagnosis:  1. Dyspnea on exertion  CT-CTA CHEST PULMONARY ARTERY W/ RECONS    AMB PULMONARY FUNCTION TEST/LAB    ECHOCARDIOGRAM COMP W/O CONT    predniSONE (DELTASONE) 10 MG Tab   2. Hypoxia  CT-CTA CHEST PULMONARY ARTERY W/ RECONS   3. Encounter for preprocedural laboratory examination  BUN    CREATININE   4. Pulmonary emphysema, unspecified emphysema type (CMS-HCC)  budesonide-formoterol (SYMBICORT) 160-4.5 MCG/ACT Aerosol    predniSONE (DELTASONE) 10 MG Tab       Plan:  Patient reports significant exertional dyspnea with associated desaturations despite O2. She reports her oxygen requirements have gone up in the past 2 weeks. She denies any infectious symptoms. No evidence of heart failure on exam. She has no lower extremity edema.She has not had any recent travel, no trauma or evidence of DVT, nonetheless With her significant dyspnea and increasing oxygen requirements will obtain CTA stat to rule out PE.   She understands if evidence of PE on exam she will be admitted to the hospital.    If no evidence of PE she'll continue Symbicort 160/4.5, 2 puffs, twice a day. RX sent to her pharmacy. Consider adding Spiriva at next OV    Update PFTs, repeat Multiox at next visit    Prednisone taper to pharmacy    Echocardiogram, patient does have sleep apnea, will check for pulmonary hypertension    Consider cardiology referral    Follow-up after testing to review

## 2018-04-26 DIAGNOSIS — E78.2 MIXED HYPERLIPIDEMIA: ICD-10-CM

## 2018-04-26 RX ORDER — ATORVASTATIN CALCIUM 40 MG/1
40 TABLET, FILM COATED ORAL DAILY
Qty: 90 TAB | Refills: 1 | Status: SHIPPED | OUTPATIENT
Start: 2018-04-26 | End: 2018-07-17 | Stop reason: SDUPTHER

## 2018-05-07 ENCOUNTER — HOSPITAL ENCOUNTER (OUTPATIENT)
Dept: CARDIOLOGY | Facility: MEDICAL CENTER | Age: 63
End: 2018-05-07
Attending: NURSE PRACTITIONER
Payer: COMMERCIAL

## 2018-05-07 DIAGNOSIS — R06.09 DYSPNEA ON EXERTION: ICD-10-CM

## 2018-05-07 LAB
LV EJECT FRACT  99904: 65
LV EJECT FRACT MOD 4C 99902: 64.31

## 2018-05-07 PROCEDURE — 93306 TTE W/DOPPLER COMPLETE: CPT

## 2018-05-07 PROCEDURE — 93306 TTE W/DOPPLER COMPLETE: CPT | Mod: 26 | Performed by: INTERNAL MEDICINE

## 2018-05-08 ENCOUNTER — OFFICE VISIT (OUTPATIENT)
Dept: PULMONOLOGY | Facility: HOSPICE | Age: 63
End: 2018-05-08
Payer: COMMERCIAL

## 2018-05-08 ENCOUNTER — NON-PROVIDER VISIT (OUTPATIENT)
Dept: PULMONOLOGY | Facility: HOSPICE | Age: 63
End: 2018-05-08
Payer: COMMERCIAL

## 2018-05-08 VITALS
HEIGHT: 62 IN | DIASTOLIC BLOOD PRESSURE: 62 MMHG | BODY MASS INDEX: 50.61 KG/M2 | TEMPERATURE: 98.8 F | WEIGHT: 275 LBS | SYSTOLIC BLOOD PRESSURE: 98 MMHG | HEART RATE: 81 BPM | OXYGEN SATURATION: 92 % | RESPIRATION RATE: 16 BRPM

## 2018-05-08 VITALS — BODY MASS INDEX: 50.3 KG/M2 | WEIGHT: 275 LBS

## 2018-05-08 DIAGNOSIS — J43.9 PULMONARY EMPHYSEMA, UNSPECIFIED EMPHYSEMA TYPE (HCC): ICD-10-CM

## 2018-05-08 DIAGNOSIS — J96.11 CHRONIC RESPIRATORY FAILURE WITH HYPOXIA (HCC): ICD-10-CM

## 2018-05-08 DIAGNOSIS — R06.09 DYSPNEA ON EXERTION: ICD-10-CM

## 2018-05-08 DIAGNOSIS — G47.33 OSA (OBSTRUCTIVE SLEEP APNEA): ICD-10-CM

## 2018-05-08 DIAGNOSIS — E66.01 MORBID OBESITY WITH BMI OF 45.0-49.9, ADULT (HCC): ICD-10-CM

## 2018-05-08 DIAGNOSIS — Z87.891 FORMER CIGARETTE SMOKER: ICD-10-CM

## 2018-05-08 PROCEDURE — 94726 PLETHYSMOGRAPHY LUNG VOLUMES: CPT | Performed by: INTERNAL MEDICINE

## 2018-05-08 PROCEDURE — 99214 OFFICE O/P EST MOD 30 MIN: CPT | Performed by: NURSE PRACTITIONER

## 2018-05-08 PROCEDURE — 94060 EVALUATION OF WHEEZING: CPT | Performed by: INTERNAL MEDICINE

## 2018-05-08 PROCEDURE — 94729 DIFFUSING CAPACITY: CPT | Performed by: INTERNAL MEDICINE

## 2018-05-08 ASSESSMENT — PULMONARY FUNCTION TESTS
FEV1_PERCENT_CHANGE: 22
FEV1/FVC_PERCENT_LLN: 64
FEV1: 1.02
FEV1/FVC_PREDICTED: 77
FVC_LLN: 2.49
FEV1/FVC_PERCENT_CHANGE: 82
FEV1_PERCENT_CHANGE: 18
FEV1_PREDICTED: 2.28
FEV1/FVC_PERCENT_CHANGE: -3
FEV1/FVC: 71.6
FEV1/FVC: 74
FEV1/FVC: 72
FVC_PERCENT_PREDICTED: 56
FEV1: 1.21
FVC_PERCENT_PREDICTED: 46
FEV1/FVC_PERCENT_PREDICTED: 77
FVC_PREDICTED: 2.98
FEV1/FVC_PERCENT_PREDICTED: 96
FEV1_PERCENT_PREDICTED: 44
FEV1/FVC_PERCENT_PREDICTED: 93
FEV1_LLN: 1.90
FEV1/FVC: 74
FEV1/FVC_PERCENT_PREDICTED: 95
FVC: 1.37
FEV1_PERCENT_PREDICTED: 53
FEV1/FVC_PERCENT_PREDICTED: 96
FVC: 1.69

## 2018-05-08 NOTE — PROGRESS NOTES
Chief Complaint   Patient presents with   • Apnea     PFT/ Echo results         HPI: Kori is 62 y.o. female, who presents for PFT and echo results.     Medical history includes HTN, hypothyroid, obesity BMI 50, breast cancer S/P lumpectomy and radiation therapy. She follows with oncology. She has declined any type of medications for her breast cancer.     Patient is a former smoker, 47 pack year history quit December 2016.  PFTs today show decline from previous with an FEV1 of 1.02 L 44% predicted, FEV1 FVC ratio of 74, TLC and DLCO are normal.  There is positive bronchodilator response, FEV1 improved to 53% of predicted.  She is been using Symbicort 160/4.5, she has tried Advair, Breo, Spiriva and Anoro in the past with adverse reaction.  Symbicort does not cause adverse reaction but she does not feel it is beneficial.  She uses albuterol with minimal improvement in dyspnea. She was provided prednisone taper after last visit with no improvement in dyspnea.  She does report significant seasonal allergy, watery eyes, frequent sneezing, postnasal drip.  She reports her worsening dyspnea started around the same time as her allergy symptoms. She has a friend at work who uses Dulera with benefit. She would like to try this.  She should be using 2 L of oxygen with exertion and nocturnally.  She says she uses this most of the time but not always.     In regards to LUIS, PSG indicates AHI of 21.5, minimum saturation of 76%.  She is compliant with auto CPAP 8-12 cm H2O.  She restarted this after her last visit.  She has had 100% compliance since restarting.  Average use 8 hours per night, AHI of 2.6.  She has trouble with mask interface, she is using a full facemask, she has not tried a nasal mask.  She finds CPAP to be more disruptive than beneficial.  She understands the importance of use. The pressure is comfortable.    Past Medical History:   Diagnosis Date   • Asthma    • Bowel habit changes    • Breast cancer (HCC)      2017   • Breath shortness    • Cancer (HCC)     Breast   • Chickenpox    • Colon polyps     2017   • Congestive heart failure (HCC)    • Dental disorder     Full upper/lower dentures.   • Emphysema of lung (HCC)    • Essential hypertension 3/31/2017   • Heart burn    • Indigestion    • Mumps    • Obesity    • Sleep apnea     CPAP use.   • Tobacco abuse        Social History   Substance Use Topics   • Smoking status: Former Smoker     Packs/day: 1.00     Years: 47.00     Types: Cigarettes     Quit date: 12/2/2016   • Smokeless tobacco: Never Used   • Alcohol use No       Family History   Problem Relation Age of Onset   • No Known Problems Daughter    • Cancer Sister      cervical   • Cancer Sister      renal   • Cancer Maternal Uncle      lung   • Cancer Maternal Uncle      melanoma   • Sleep Apnea Neg Hx        Current medications as of today   Current Outpatient Prescriptions   Medication Sig Dispense Refill   • Mometasone Furo-Formoterol Fum (DULERA) 200-5 MCG/ACT Aerosol Inhale 2 Puffs by mouth 2 Times a Day. Use spacer. Rinse mouth after use. 2 Inhaler 0   • atorvastatin (LIPITOR) 40 MG Tab Take 1 Tab by mouth every day. 90 Tab 1   • triamcinolone acetonide (KENALOG) 0.1 % Cream Twice daily for 2 weeks at a time 30 g 1   • albuterol 108 (90 Base) MCG/ACT Aero Soln inhalation aerosol Inhale 2 Puffs by mouth every 6 hours as needed for Shortness of Breath. 1 Inhaler 3   • lisinopril-hydrochlorothiazide (PRINZIDE, ZESTORETIC) 10-12.5 MG per tablet Take 1 Tab by mouth every day. 90 Tab 2   • levothyroxine (SYNTHROID) 50 MCG Tab Take 1 Tab by mouth Every morning on an empty stomach. 90 Tab 3   • Multiple Vitamins-Minerals (MULTIVITAMIN ADULT PO) Take  by mouth.     • NON SPECIFIED Please administer Zostivax 1 Each 0   • MELATONIN PO Take 1 tablet by mouth at bedtime as needed.       No current facility-administered medications for this visit.        Allergies: Bee venom    Blood pressure (!) 98/62, pulse 81,  "temperature 37.1 °C (98.8 °F), resp. rate 16, height 1.575 m (5' 2\"), weight 124.7 kg (275 lb), SpO2 92 %.      ROS: As per HPI and otherwise negative if not stated.    Physical exam:   Constitutional: Obese, in no acute distress  Eyes: PERRL  Mouth/Throat: Oropharynx is moist, clear, no lesions  Neck: supple, trachea midline  Respiratory: no intercostal retractions or accessory muscle use   Lungs auscultation: Clear to auscultation bilaterally  Cardiovascular: Regular rate rhythm no murmurs, rubs or gallops  Musculoskeletal:  no clubbing or cyanosis  Skin: No rashes or lesions noted on exposed skin  Neuro: No focal deficit noted  Psychiatric: Oriented to time, person and place.     Diagnosis:  1. LUIS (obstructive sleep apnea)  DME OTHER   2. Pulmonary emphysema, unspecified emphysema type (HCC)     3. Dyspnea on exertion  REFERRAL TO CARDIOLOGY   4. Morbid obesity with BMI of 45.0-49.9, adult (HCC)     5. Former cigarette smoker     6. Chronic respiratory failure with hypoxia (HCC)         Plan:    Her only complaint remains exertional dyspnea.  She says that this worsened approximately 2 months ago with allergy season.  She denies chest pain or pressure.  She denies lower extremity edema.  CTA was negative for PE, but did note prominent pulmonary vasculature.  Echocardiogram showed a normal ejection fraction, no mention of pulmonary hypertension.  She denies cough or wheeze, although she does have an moderate to severe obstructive lung disease with asthmatic component on PFTs and for this reason is encouraged to continue ICS/LABA.      1.  D/C symbicort Start Dulera, 2 puffs 2 x per day. RX to  sample from clinic  2.  Use oxygen with exertion and nocturnal, she understands the potential cardiac and neurologic implications of chronic hypoxemia.  3.  Continue CPAP, order for mask fitting to [facility]  4.  Stat cardiology referral to further evaluate exertional dyspnea, echocardiogram was unremarkable, patient will likely " require stress testing.  5.  Start daily Claritin for allergy symptoms  6.  Follow-up here in 1 month  7.  ER for any worsening symptoms  8.  Permanent and complete smoking cessation recommend

## 2018-05-08 NOTE — PROCEDURES
Technician Marixa Bullock, [facility] Comments:Good patient effort & cooperation.  The results of this test meet the ATS/ERS standards for acceptability & reproducibility.  Test was performed on the Atavist Body Plethysmograph-Elite DX system.  Predicted values were [facility]-3 for spirometry, [facility] for DLCO, ITS for Lung Volumes.  The DLCO was uncorrected for Hgb.  A bronchodilator of Ventolin HFA -2puffs via spacer administered.  DLCO performed during dilation period.      The FVC is 1.69 L or 56%, FEV1 is 1.21 L or 53%, FEV1/FVC 72%.  Significant bronchodilator response.  Normal TLC of 97%.  Increased RV.  DLCO 118%.    Interpretation:  PFT shows moderately severe obstructive ventilatory defect with significant bronchodilator response.  The best FEV1 is 1.21 L or 53% predicted.  Normal diffusion capacity.

## 2018-05-08 NOTE — PATIENT INSTRUCTIONS
1.  D/C symbicort Start Dulera, 2 puffs 2 x per day.  sample from clinic  2.  Use oxygen with exertion and nocturnal  3.  Continue CPAP, order for mask fitting to [facility]  4.  Stat cardiology referral  5.  Start daily Claritin for allergy symptoms  6.  Follow-up here in 1 month  7.  ER for any worsening symptoms  8.  Permanent and complete smoking cessation recommend

## 2018-05-14 DIAGNOSIS — R79.89 ABNORMAL TSH: ICD-10-CM

## 2018-05-14 NOTE — TELEPHONE ENCOUNTER
Was the patient seen in the last year in this department? Yes     Does patient have an active prescription for medications requested? No     Received Request Via: Pharmacy      Pt met protocol?: Yes pt last ov 12/17   TSH   Date Value Ref Range Status   04/23/2018 4.440 0.380 - 5.330 uIU/mL Final     Comment:     Please note new reference ranges effective 12/14/2017 10:00 AM  Pregnant Females, 1st Trimester  0.050-3.700  Pregnant Females, 2nd Trimester  0.310-4.350  Pregnant Females, 3rd Trimester  0.410-5.180

## 2018-05-16 RX ORDER — LEVOTHYROXINE SODIUM 0.05 MG/1
50 TABLET ORAL
Qty: 90 TAB | Refills: 1 | Status: SHIPPED | OUTPATIENT
Start: 2018-05-16 | End: 2018-07-17 | Stop reason: SDUPTHER

## 2018-05-16 NOTE — TELEPHONE ENCOUNTER
Last seen by PCP 12/17. Will send 6 months to pharmacy.    TSH   Date Value Ref Range Status   04/23/2018 4.440 0.380 - 5.330 uIU/mL Final     Comment:     Please note new reference ranges effective 12/14/2017 10:00 AM  Pregnant Females, 1st Trimester  0.050-3.700  Pregnant Females, 2nd Trimester  0.310-4.350  Pregnant Females, 3rd Trimester  0.410-5.180

## 2018-05-23 NOTE — TELEPHONE ENCOUNTER
Have we ever prescribed this med? Yes.  If yes, what date? 12/18/17    Last OV: 5/18/18    Next OV:6/5/18    DX: Pulmonary emphysema, unspecified emphysema type (HCC    Medications: Mometasone Furo-Formoterol Fum (DULERA) 200-5 MCG/ACT Aerosol

## 2018-05-23 NOTE — TELEPHONE ENCOUNTER
Regarding: FW: Prescription Question  Contact: [phone]  Can you start my chart encounter and upload Dulera to the right pharmacy with 90 day supply    Can you also work with Ivania to see if prior auth needed.    Severiano Hughes  ----- Message -----  From: Kathie Sylvester, Med Ass't  Sent: 5/21/2018   8:13 AM  To: [email or web address] [email or web address]  Subject: Prescription Question                            ----- Message from Kathie Sylvester, Med Ass't sent at 5/21/2018  8:13 AM PDT -----       ----- Message from Mallorie Xie to YEN [email or web address] sent at 5/18/2018  6:45 PM -----   Dharmesh Mckenzie,  I wanted to let you know I am seeing improvement in how I feel & my oxygen levels.  I have only been using the Dulera for about a week, but my levels are not dropping so quickly as before.  and I am having some success losing weight.   I wanted to ask you to go ahead and give me a prescription for Dulera.  The State of [place] is changing Rx providers 7/1/2018 to [facility].  They have indicated that Dulera, Symbicort & drugs of that category will require a Step Review be initiated by my DR on their website after 7/1 & the process may delay approval of my prescription.  To that end, I am requesting you order a 3 month supply for me from [facility] in [place]; not [facility] as they won't do 90 days for this med.    Thank You,  Kori

## 2018-05-25 ENCOUNTER — TELEPHONE (OUTPATIENT)
Dept: PULMONOLOGY | Facility: HOSPICE | Age: 63
End: 2018-05-25

## 2018-05-25 NOTE — TELEPHONE ENCOUNTER
MEDICATION PRIOR AUTHORIZATION NEEDED:    1. Name of Medication: Dulera 200/5 mcg    2. Requested By (Name of Pharmacy): [facility]     3. Is insurance on file current? yes    4. What is the name & phone number of the 3rd party payor? [facility] [phone]

## 2018-05-25 NOTE — TELEPHONE ENCOUNTER
DOCUMENTATION OF PRIOR AUTH STATUS    1. Medication name and dose: Dulera 200/5 mcg    2. Name and Phone # of Prescription coverage company: [employer] [phone]    3. Date Prior Auth was submitted: 5/25/2018    4. What information was given to obtain insurance decision: Clinical notes    5. Prior Auth letter Approved or Denied: Approved through 5/24/2019    6. Pharmacy notified: Yes    7. Patient notified: Yes

## 2018-05-25 NOTE — TELEPHONE ENCOUNTER
----- Message from Mallorie Xie sent at 5/24/2018  5:43 PM PDT -----  Regarding: Prescription Question  Contact: [phone]  Dharmesh Mckenzie,    I am down to my last 14 days of Dulera and no RX from you at either [facility] or [facility].  Hoping you order it soon.  I don't want to run out, it is working very well.  Thank You,  Kori

## 2018-06-05 ENCOUNTER — TELEPHONE (OUTPATIENT)
Dept: PULMONOLOGY | Facility: HOSPICE | Age: 63
End: 2018-06-05

## 2018-06-05 ENCOUNTER — OFFICE VISIT (OUTPATIENT)
Dept: PULMONOLOGY | Facility: HOSPICE | Age: 63
End: 2018-06-05
Payer: COMMERCIAL

## 2018-06-05 VITALS
SYSTOLIC BLOOD PRESSURE: 110 MMHG | DIASTOLIC BLOOD PRESSURE: 72 MMHG | HEIGHT: 62 IN | OXYGEN SATURATION: 94 % | WEIGHT: 272 LBS | HEART RATE: 94 BPM | RESPIRATION RATE: 16 BRPM | TEMPERATURE: 97.7 F | BODY MASS INDEX: 50.05 KG/M2

## 2018-06-05 DIAGNOSIS — J96.11 CHRONIC RESPIRATORY FAILURE WITH HYPOXIA (HCC): ICD-10-CM

## 2018-06-05 DIAGNOSIS — G47.33 OSA (OBSTRUCTIVE SLEEP APNEA): ICD-10-CM

## 2018-06-05 DIAGNOSIS — Z72.0 TOBACCO ABUSE: ICD-10-CM

## 2018-06-05 DIAGNOSIS — Z87.891 FORMER CIGARETTE SMOKER: ICD-10-CM

## 2018-06-05 DIAGNOSIS — E66.01 MORBID OBESITY WITH BMI OF 45.0-49.9, ADULT (HCC): ICD-10-CM

## 2018-06-05 DIAGNOSIS — R06.09 DYSPNEA ON EXERTION: ICD-10-CM

## 2018-06-05 DIAGNOSIS — J43.9 PULMONARY EMPHYSEMA, UNSPECIFIED EMPHYSEMA TYPE (HCC): ICD-10-CM

## 2018-06-05 PROCEDURE — 99214 OFFICE O/P EST MOD 30 MIN: CPT | Performed by: NURSE PRACTITIONER

## 2018-06-05 NOTE — PROGRESS NOTES
Chief Complaint   Patient presents with   • Follow-Up     1 mth FU         HPI: This patient is a 63 y.o. female, who presents for one-month follow-up dyspnea.     Medical history includes HTN, hypothyroid, obesity BMI 50, breast cancer S/P lumpectomy and radiation therapy. She follows with oncology and is scheduled for F/U mammogram. She has declined any type of medications for her breast cancer.     Patient is a former smoker, 47 pack year history quit December 2016. Her primary complaint has been dyspnea with exertion.  CTA was negative for PE.  Echocardiogram showed normal ejection fraction, no mention of pulmonary hypertension.  PFTs May 2018 show decline from previous with an FEV1 of 1.02 L 44% predicted, FEV1 FVC ratio of 74, TLC and DLCO are normal.  There is positive bronchodilator response, FEV1 improved to 53% of predicted.  She has tried Symbicort 160/4.5, Advair, Breo, Spiriva and Anoro in the past with adverse reaction or no subjective benefit. She was started on Dulera at last visit.    She feels this is been very beneficial.  Her dyspnea is improved significantly.  She has been walking more and trying to lose weight.  She has been noncompliant with O2 with exertion.  She was referred to cardiology after her last visit to further evaluate exertional dyspnea.  She is scheduled to see cardiology in July. she was started on Claritin for allergy symptoms.       In regards to LUIS, PSG indicates AHI of 21.5, minimum saturation of 76%.  She is compliant with auto CPAP 8-12 cm H2O. Compliance report shows 100% use, average use of almost 8 hours per night, AHI of 1.8.  She is tolerating her machine.  She is following up with [facility] today for a new mask    Past Medical History:   Diagnosis Date   • Asthma    • Bowel habit changes    • Breast cancer (HCC)     2017   • Breath shortness    • Cancer (HCC)     Breast   • Chickenpox    • Colon polyps     2017   • Congestive heart failure (HCC)    • Dental  "disorder     Full upper/lower dentures.   • Emphysema of lung (HCC)    • Essential hypertension 3/31/2017   • Heart burn    • Indigestion    • Mumps    • Obesity    • Sleep apnea     CPAP use.   • Tobacco abuse        Social History   Substance Use Topics   • Smoking status: Former Smoker     Packs/day: 1.00     Years: 47.00     Types: Cigarettes     Quit date: 12/2/2016   • Smokeless tobacco: Never Used   • Alcohol use No       Family History   Problem Relation Age of Onset   • No Known Problems Daughter    • Cancer Sister      cervical   • Cancer Sister      renal   • Cancer Maternal Uncle      lung   • Cancer Maternal Uncle      melanoma   • Sleep Apnea Neg Hx        Current medications as of today   Current Outpatient Prescriptions   Medication Sig Dispense Refill   • Mometasone Furo-Formoterol Fum (DULERA) 200-5 MCG/ACT Aerosol Inhale 2 Puffs by mouth 2 Times a Day. Use spacer. Rinse mouth after use. 3 Inhaler 3   • levothyroxine (SYNTHROID) 50 MCG Tab Take 1 Tab by mouth Every morning on an empty stomach. 90 Tab 1   • atorvastatin (LIPITOR) 40 MG Tab Take 1 Tab by mouth every day. 90 Tab 1   • Multiple Vitamins-Minerals (MULTIVITAMIN ADULT PO) Take  by mouth.     • NON SPECIFIED Please administer Zostivax 1 Each 0   • triamcinolone acetonide (KENALOG) 0.1 % Cream Twice daily for 2 weeks at a time 30 g 1   • albuterol 108 (90 Base) MCG/ACT Aero Soln inhalation aerosol Inhale 2 Puffs by mouth every 6 hours as needed for Shortness of Breath. 1 Inhaler 3   • lisinopril-hydrochlorothiazide (PRINZIDE, ZESTORETIC) 10-12.5 MG per tablet Take 1 Tab by mouth every day. 90 Tab 2   • MELATONIN PO Take 1 tablet by mouth at bedtime as needed.       No current facility-administered medications for this visit.        Allergies: Bee venom    Blood pressure 110/72, pulse 94, temperature 36.5 °C (97.7 °F), resp. rate 16, height 1.575 m (5' 2\"), weight 123.4 kg (272 lb), SpO2 94 %.      ROS: As per HPI and otherwise negative if " not stated.    Physical exam:   Constitutional: Obese, in no acute distress  Eyes: PERRL  Mouth/Throat: Oropharynx is moist, clear, no lesions, Mallampati 4  Neck: supple, trachea midline  Respiratory: no intercostal retractions or accessory muscle use   Lungs auscultation: Clear to auscultation bilaterally, diminished bilateral bases  Cardiovascular: Regular rate rhythm no murmurs, rubs or gallops  Musculoskeletal:  no clubbing or cyanosis  Skin: No rashes or lesions noted on exposed skin  Neuro: No focal deficit noted  Psychiatric: Oriented to time, person and place.     Diagnosis:  1. Tobacco abuse     2. Morbid obesity with BMI of 45.0-49.9, adult ([facility])     3. Former cigarette smoker     4. Dyspnea on exertion     5. Pulmonary emphysema, unspecified emphysema type ([facility])     6. LUIS (obstructive sleep apnea)     7. Chronic respiratory failure with hypoxia ([facility])         Plan:  Patient continues to have exertional dyspnea but this is significantly improved with the addition of Dulera.  She denies other pulmonary complaints.  She denies chest pain or pressure.  Despite improvement in dyspnea I still recommend follow-up with cardiology for further evaluation.  She is amendable to this and agrees.    1.  Continue Dulera 200/5, 2 puffs, twice a day, rinse mouth after use.  Patient's insurance is changing as of July 1 and she will require another prior authorization.  Our office will work on this.  2.  Continue albuterol as needed  3.  Routine walking and weight loss encouraged  4.  Follow-up in July as scheduled with cardiology  5.  Permanent and complete smoking cessation recommend  6.  Follow-up here in 6 months, sooner for any worsening symptoms  7. Patient is up-to-date with influenza, Pneumovax 23 and Prevnar 13 vaccinations.

## 2018-06-05 NOTE — TELEPHONE ENCOUNTER
Ivania Sheikh's insurance will change on July 1st and she will need another prior auth for her Dulera on this day and a new RX for dulera and [facility]. Can you work on this on July 1?    Thanks Magaly

## 2018-06-18 ENCOUNTER — OFFICE VISIT (OUTPATIENT)
Dept: MEDICAL GROUP | Facility: PHYSICIAN GROUP | Age: 63
End: 2018-06-18
Payer: COMMERCIAL

## 2018-06-18 VITALS
BODY MASS INDEX: 49.5 KG/M2 | OXYGEN SATURATION: 94 % | TEMPERATURE: 98.1 F | WEIGHT: 269 LBS | HEIGHT: 62 IN | HEART RATE: 76 BPM | SYSTOLIC BLOOD PRESSURE: 124 MMHG | RESPIRATION RATE: 16 BRPM | DIASTOLIC BLOOD PRESSURE: 86 MMHG

## 2018-06-18 DIAGNOSIS — E66.01 MORBID OBESITY WITH BMI OF 45.0-49.9, ADULT (HCC): ICD-10-CM

## 2018-06-18 DIAGNOSIS — I10 ESSENTIAL HYPERTENSION: ICD-10-CM

## 2018-06-18 DIAGNOSIS — J43.9 PULMONARY EMPHYSEMA, UNSPECIFIED EMPHYSEMA TYPE (HCC): ICD-10-CM

## 2018-06-18 DIAGNOSIS — D05.11 INTRADUCTAL CARCINOMA IN SITU OF RIGHT BREAST: ICD-10-CM

## 2018-06-18 DIAGNOSIS — G47.33 OSA (OBSTRUCTIVE SLEEP APNEA): ICD-10-CM

## 2018-06-18 DIAGNOSIS — E03.9 ACQUIRED HYPOTHYROIDISM: ICD-10-CM

## 2018-06-18 DIAGNOSIS — J96.11 CHRONIC RESPIRATORY FAILURE WITH HYPOXIA (HCC): ICD-10-CM

## 2018-06-18 PROBLEM — R09.02 HYPOXIA: Status: RESOLVED | Noted: 2017-04-07 | Resolved: 2018-06-18

## 2018-06-18 PROBLEM — Z01.812 ENCOUNTER FOR PREPROCEDURAL LABORATORY EXAMINATION: Status: RESOLVED | Noted: 2018-04-23 | Resolved: 2018-06-18

## 2018-06-18 PROCEDURE — 99214 OFFICE O/P EST MOD 30 MIN: CPT | Performed by: NURSE PRACTITIONER

## 2018-06-18 RX ORDER — INHALER, ASSIST DEVICES
SPACER (EA) MISCELLANEOUS
COMMUNITY
Start: 2018-04-23 | End: 2018-07-17 | Stop reason: SDUPTHER

## 2018-06-18 RX ORDER — LORATADINE 10 MG/1
10 TABLET ORAL DAILY
COMMUNITY
End: 2022-07-20

## 2018-06-18 ASSESSMENT — PATIENT HEALTH QUESTIONNAIRE - PHQ9: CLINICAL INTERPRETATION OF PHQ2 SCORE: 0

## 2018-06-18 NOTE — PROGRESS NOTES
Chief Complaint   Patient presents with   • Hypertension     6 month follow up         This is a 63 y.o.female patient that presents today with the following: Six-month follow-up    Acquired hypothyroidism  This is a chronic condition, stable and fairly well controlled on current medications including levothyroxine 50 mcg daily.  She will be due for labs next April 2018.  She tolerates medications well with no significant or bothersome side effects.  She does not need refills at this time, but can call as needed.    Pulmonary emphysema (CMS-HCC)  This is a chronic condition, stable.  She has been tried on several different controller steroid inhalers without adequate control.  She was most recently put on Dulera by pulmonology and feels that this medication is working very well for her.  She does continue to desaturate with exertion.  She also has history of obstructive sleep apnea and is noncompliant with use due to mask keeping her awake and feeling like she is suffocating.  She reports to me that she did discuss this with pulmonology as well.  I strongly advised her to continue with CPAP use and she understands the risks associated with not using this consistently.  She is to keep her upcoming appointments with pulmonology    LUIS (obstructive sleep apnea)  This is a chronic condition, stable.  However she reports to me that she has difficulty with wearing her mask, she has been tried on several different masks that do not make any improvement.  She reports to me that she is constantly waking up through the night, she does not like to wear the CPAP makes her feel, makes her feel like she is suffocating.  I did advise her to talk to DME to see if there is any other type of mask she can wear, she states she is not interested she does not think changing the mask will help as she has tried a couple of other types.  I did strongly advised her to continue with CPAP, she understands the risks associated with not using  the CPAP as directed.    Intraductal carcinoma in situ of right breast  Patient was diagnosed with intraductal carcinoma in situ of the right breast.  She did have a lumpectomy, but has declined further treatment including medications and breast reconstruction.  She does understand the risks associated with not treating this.  She is due for a mammogram, this has been ordered.    Morbid obesity with BMI of 45.0-49.9, adult (HCC)  .  This is chronic, uncontrolled but slightly improving.  She has lost 3 pounds since June 5, she was congratulated for this.  She does understand the risks associated with her weight and believes that her weight is a big contributing to her difficulty with breathing.  She continues to work on this, she is trying to exercises much as she can tolerate and trying to make significant dietary changes.      No visits with results within 1 Month(s) from this visit.   Latest known visit with results is:   Hospital Outpatient Visit on 05/07/2018   Component Date Value   • Eject.Frac. MOD 4C 05/07/2018 64.31    • Left Ventrical Ejection * 05/07/2018 65          clinical course has been stable    Past Medical History:   Diagnosis Date   • Asthma    • Bowel habit changes    • Breast cancer (HCC)     2017   • Breath shortness    • Cancer (HCC)     Breast   • Chickenpox    • Colon polyps     2017   • Congestive heart failure (HCC)    • Dental disorder     Full upper/lower dentures.   • Emphysema of lung (HCC)    • Essential hypertension 3/31/2017   • Heart burn    • Indigestion    • Mumps    • Obesity    • Sleep apnea     CPAP use.   • Tobacco abuse        Past Surgical History:   Procedure Laterality Date   • MASTECTOMY Right 10/24/2017    Procedure: MASTECTOMY- PARTIAL, WIRE LOCALIZED;  Surgeon: Marina Mansfield M.D.;  Location: SURGERY SAME DAY [facility];  Service: General   • COLONOSCOPY         Family History   Problem Relation Age of Onset   • No Known Problems Daughter    • Cancer Sister       cervical   • Cancer Sister      renal   • Cancer Maternal Uncle      lung   • Cancer Maternal Uncle      melanoma   • Sleep Apnea Neg Hx        Bee venom    Current Outpatient Prescriptions Ordered in [facility]   Medication Sig Dispense Refill   • aspirin 81 MG tablet Take 81 mg by mouth every day.     • loratadine (CLARITIN) 10 MG Tab Take 10 mg by mouth every day.     • Cetirizine HCl (ZYRTEC ALLERGY) 10 MG Cap Take  by mouth.     • Spacer/Aero-Holding Chambers (COMPACT SPACE CHAMBER) Device      • Mometasone Furo-Formoterol Fum (DULERA) 200-5 MCG/ACT Aerosol Inhale 2 Puffs by mouth 2 Times a Day. Use spacer. Rinse mouth after use. 3 Inhaler 3   • levothyroxine (SYNTHROID) 50 MCG Tab Take 1 Tab by mouth Every morning on an empty stomach. 90 Tab 1   • atorvastatin (LIPITOR) 40 MG Tab Take 1 Tab by mouth every day. 90 Tab 1   • Multiple Vitamins-Minerals (MULTIVITAMIN ADULT PO) Take  by mouth.     • NON SPECIFIED Please administer Zostivax 1 Each 0   • triamcinolone acetonide (KENALOG) 0.1 % Cream Twice daily for 2 weeks at a time 30 g 1   • albuterol 108 (90 Base) MCG/ACT Aero Soln inhalation aerosol Inhale 2 Puffs by mouth every 6 hours as needed for Shortness of Breath. 1 Inhaler 3   • lisinopril-hydrochlorothiazide (PRINZIDE, ZESTORETIC) 10-12.5 MG per tablet Take 1 Tab by mouth every day. 90 Tab 2   • MELATONIN PO Take 1 tablet by mouth at bedtime as needed.       No current Epic-ordered facility-administered medications on file.        Constitutional ROS: No unexpected change in weight, No weakness, No unexplained fevers, sweats, or chills  Pulmonary ROS: Positive per HPI  Cardiovascular ROS: No chest pain, No edema, No palpitations, Positive for hypertension hyperlipidemia  Gastrointestinal ROS: No abdominal pain, No nausea, vomiting, diarrhea, or constipation, Positive for rectal bleeding  Breast ROS: Positive per HPI  Musculoskeletal/Extremities ROS: No clubbing, No peripheral edema, No pain, redness or swelling  "on the joints  Neurologic ROS: Normal development, No seizures, No weakness  Endocrine ROS: Positive per HPI    Physical exam:  /86   Pulse 76   Temp 36.7 °C (98.1 °F)   Resp 16   Ht 1.575 m (5' 2\")   Wt 122 kg (269 lb)   SpO2 94%   BMI 49.20 kg/m²   General Appearance: Older female, alert, no distress, morbidly obese, well-groomed  Skin: Skin color, texture, turgor normal. No rashes or lesions.  Lungs: negative findings: normal respiratory rate and rhythm, lungs clear to auscultation  Heart: negative. RRR without murmur, gallop, or rubs.  No ectopy.  Abdomen: Abdomen soft, non-tender. BS normal. No masses,  No organomegaly  Musculoskeletal: negative findings: ROM of all joints is normal, strength normal, no deformities present  Neurologic: intact, CN II through XII grossly intact    Medical decision making/discussion: Patient is to continue care for all of her specialist and keep upcoming appointments.  Follow-up diagnostic mammogram has been ordered, she is to have this done before she sees oncology.  She is to have lipid profile done in August as previously ordered, she will be notified of results and further actions if needed.  I would like to see her back in 6 months, December or January is fine.  She is to let me know when her new insurance is effective so that we can send her prescriptions to [facility].  Again, encouraged to complete health maintenance exams, she continues to decline despite known risks of not having them done.    Mallorie was seen today for hypertension.    Diagnoses and all orders for this visit:    LUIS (obstructive sleep apnea)    Pulmonary emphysema, unspecified emphysema type (HCC)    Chronic respiratory failure with hypoxia (HCC)    Morbid obesity with BMI of 45.0-49.9, adult (HCC)  -     Patient identified as having weight management issue.  Appropriate orders and counseling given.    Acquired hypothyroidism    Essential hypertension    Intraductal carcinoma in situ of " right breast  -     MA-MAMMO DIAGNOSTIC BILAT W/IGNACIO W/CAD; Future          Please note that this dictation was created using voice recognition software. I have made every reasonable attempt to correct obvious errors, but I expect that there are errors of grammar and possibly content that I did not discover before finalizing the note.

## 2018-06-18 NOTE — ASSESSMENT & PLAN NOTE
This is a chronic condition, stable and fairly well controlled on current medications including levothyroxine 50 mcg daily.  She will be due for labs next April 2018.  She tolerates medications well with no significant or bothersome side effects.  She does not need refills at this time, but can call as needed.

## 2018-06-18 NOTE — ASSESSMENT & PLAN NOTE
This is a chronic condition, stable.  However she reports to me that she has difficulty with wearing her mask, she has been tried on several different masks that do not make any improvement.  She reports to me that she is constantly waking up through the night, she does not like to wear the CPAP makes her feel, makes her feel like she is suffocating.  I did advise her to talk to DME to see if there is any other type of mask she can wear, she states she is not interested she does not think changing the mask will help as she has tried a couple of other types.  I did strongly advised her to continue with CPAP, she understands the risks associated with not using the CPAP as directed.

## 2018-06-18 NOTE — ASSESSMENT & PLAN NOTE
Patient was diagnosed with intraductal carcinoma in situ of the right breast.  She did have a lumpectomy, but has declined further treatment including medications and breast reconstruction.  She does understand the risks associated with not treating this.  She is due for a mammogram, this has been ordered.

## 2018-06-18 NOTE — PATIENT INSTRUCTIONS
Mammogram before your appt with oncology    Labs in Fall--will notify you of results    Follow up with me in 6 months--January is fine    Let me know when your insurance is up and running so I can send Rx's to [facility]

## 2018-06-18 NOTE — ASSESSMENT & PLAN NOTE
.  This is chronic, uncontrolled but slightly improving.  She has lost 3 pounds since June 5, she was congratulated for this.  She does understand the risks associated with her weight and believes that her weight is a big contributing to her difficulty with breathing.  She continues to work on this, she is trying to exercises much as she can tolerate and trying to make significant dietary changes.

## 2018-06-18 NOTE — ASSESSMENT & PLAN NOTE
This is a chronic condition, stable.  She has been tried on several different controller steroid inhalers without adequate control.  She was most recently put on Dulera by pulmonology and feels that this medication is working very well for her.  She does continue to desaturate with exertion.  She also has history of obstructive sleep apnea and is noncompliant with use due to mask keeping her awake and feeling like she is suffocating.  She reports to me that she did discuss this with pulmonology as well.  I strongly advised her to continue with CPAP use and she understands the risks associated with not using this consistently.  She is to keep her upcoming appointments with pulmonology

## 2018-07-02 ENCOUNTER — TELEPHONE (OUTPATIENT)
Dept: PULMONOLOGY | Facility: HOSPICE | Age: 63
End: 2018-07-02

## 2018-07-02 NOTE — TELEPHONE ENCOUNTER
DOCUMENTATION OF PRIOR AUTH STATUS    1. Medication name and dose: Dulera 200/5 mcg    2. Name and Phone # of Prescription coverage company: [employer] [phone]    3. Date Prior Auth was submitted: 7/2/2018    4. What information was given to obtain insurance decision: Clinical notes    5. Prior Auth letter Approved or Denied: Approved through cover my meds    6. Pharmacy notified: Yes    7. Patient notified: Yes

## 2018-07-02 NOTE — TELEPHONE ENCOUNTER
MEDICATION PRIOR AUTHORIZATION NEEDED:    1. Name of Medication: Dulera 200/5 mcg    2. Requested By (Name of Pharmacy): [facility]     3. Is insurance on file current? yes    4. What is the name & phone number of the 3rd party payor? [facility] [phone]

## 2018-07-06 ENCOUNTER — TELEPHONE (OUTPATIENT)
Dept: CARDIOLOGY | Facility: MEDICAL CENTER | Age: 63
End: 2018-07-06

## 2018-07-06 ENCOUNTER — OFFICE VISIT (OUTPATIENT)
Dept: CARDIOLOGY | Facility: MEDICAL CENTER | Age: 63
End: 2018-07-06
Payer: COMMERCIAL

## 2018-07-06 VITALS
WEIGHT: 273 LBS | HEIGHT: 62 IN | OXYGEN SATURATION: 91 % | HEART RATE: 84 BPM | DIASTOLIC BLOOD PRESSURE: 84 MMHG | SYSTOLIC BLOOD PRESSURE: 132 MMHG | BODY MASS INDEX: 50.24 KG/M2

## 2018-07-06 DIAGNOSIS — E78.2 MIXED HYPERLIPIDEMIA: ICD-10-CM

## 2018-07-06 DIAGNOSIS — R06.09 DYSPNEA ON EXERTION: ICD-10-CM

## 2018-07-06 DIAGNOSIS — I10 ESSENTIAL HYPERTENSION: ICD-10-CM

## 2018-07-06 LAB — EKG IMPRESSION: NORMAL

## 2018-07-06 PROCEDURE — 99244 OFF/OP CNSLTJ NEW/EST MOD 40: CPT | Performed by: INTERNAL MEDICINE

## 2018-07-06 PROCEDURE — 93000 ELECTROCARDIOGRAM COMPLETE: CPT | Performed by: INTERNAL MEDICINE

## 2018-07-06 ASSESSMENT — ENCOUNTER SYMPTOMS
PALPITATIONS: 0
COUGH: 1
SHORTNESS OF BREATH: 1
WHEEZING: 1
CONSTIPATION: 0

## 2018-07-06 NOTE — PROGRESS NOTES
Chief Complaint   Patient presents with   • Shortness of Breath       Subjective:   Mallorie Xie is a 63 y.o. female who presents today for evaluation of shortness of breath    She is seen in consultation at the request of NOLA Hernandez for above issue    She has hypertension and hyperlipidemia but no known cardiac issue.  She used to smoke 1 PPD for 47 years.   Over the past 2 years or so, she has been having dyspnea on short distant walking.  She was subsequently diagnosed with COPD based on PFT and sleep apnea.  She has been prescribed several bronchodilators over the year but did not get much improvement perhaps with the exception of current one (Dulera).  She would still get hypoxic with <200 feet of normal paced walk.  She denies chest discomfort, orthopnea, PND or edema.    She has also been prescribed CPAP but she does not like putting it on.  She has also gained substantial amount of weight this past year.    She underwent ECHO in May;  Limited images were obtained secondary to patient pain.  Normal left ventricular systolic function.  Mild, concentric left ventricular hypertrophy.  Aortic sclerosis without stenosis.    Has never had stress test so far.    Older sister with pacemaker.    Past Medical History:   Diagnosis Date   • Asthma    • Bowel habit changes    • Breast cancer (HCC)     2017   • Breath shortness    • Cancer (HCC)     Breast   • Chickenpox    • Colon polyps     2017   • Congestive heart failure (HCC)    • Dental disorder     Full upper/lower dentures.   • Emphysema of lung (HCC)    • Essential hypertension 3/31/2017   • Heart burn    • Indigestion    • Mumps    • Obesity    • Sleep apnea     CPAP use.   • Tobacco abuse      Past Surgical History:   Procedure Laterality Date   • MASTECTOMY Right 10/24/2017    Procedure: MASTECTOMY- PARTIAL, WIRE LOCALIZED;  Surgeon: Marina Mansfield M.D.;  Location: SURGERY SAME DAY [facility];  Service: General   • COLONOSCOPY       Family  History   Problem Relation Age of Onset   • No Known Problems Daughter    • Cancer Sister      cervical   • Cancer Sister      renal   • Cancer Maternal Uncle      lung   • Cancer Maternal Uncle      melanoma   • Sleep Apnea Neg Hx      Social History     Social History   • Marital status:      Spouse name: N/A   • Number of children: N/A   • Years of education: N/A     Occupational History   • Not on file.     Social History Main Topics   • Smoking status: Former Smoker     Packs/day: 1.00     Years: 47.00     Types: Cigarettes     Quit date: 12/2/2016   • Smokeless tobacco: Never Used   • Alcohol use No   • Drug use: No   • Sexual activity: Not on file     Other Topics Concern   • Not on file     Social History Narrative   • No narrative on file     Allergies   Allergen Reactions   • Bee Venom Anaphylaxis     Outpatient Encounter Prescriptions as of 7/6/2018   Medication Sig Dispense Refill   • Homeopathic Products (ZICAM COLD REMEDY PO) Take  by mouth.     • aspirin 81 MG tablet Take 81 mg by mouth every day.     • loratadine (CLARITIN) 10 MG Tab Take 10 mg by mouth every day.     • Mometasone Furo-Formoterol Fum (DULERA) 200-5 MCG/ACT Aerosol Inhale 2 Puffs by mouth 2 Times a Day. Use spacer. Rinse mouth after use. 3 Inhaler 3   • levothyroxine (SYNTHROID) 50 MCG Tab Take 1 Tab by mouth Every morning on an empty stomach. 90 Tab 1   • atorvastatin (LIPITOR) 40 MG Tab Take 1 Tab by mouth every day. 90 Tab 1   • Multiple Vitamins-Minerals (MULTIVITAMIN ADULT PO) Take  by mouth.     • albuterol 108 (90 Base) MCG/ACT Aero Soln inhalation aerosol Inhale 2 Puffs by mouth every 6 hours as needed for Shortness of Breath. 1 Inhaler 3   • lisinopril-hydrochlorothiazide (PRINZIDE, ZESTORETIC) 10-12.5 MG per tablet Take 1 Tab by mouth every day. 90 Tab 2   • MELATONIN PO Take 1 tablet by mouth at bedtime as needed.     • Cetirizine HCl (ZYRTEC ALLERGY) 10 MG Cap Take  by mouth.     • Spacer/Aero-Holding Chambers  "(COMPACT SPACE CHAMBER) Device      • NON SPECIFIED Please administer Zostivax 1 Each 0   • triamcinolone acetonide (KENALOG) 0.1 % Cream Twice daily for 2 weeks at a time (Patient not taking: Reported on 7/6/2018) 30 g 1     No facility-administered encounter medications on file as of 7/6/2018.      Review of Systems   Constitutional: Positive for malaise/fatigue.        Gained 100 pounds this past year   Respiratory: Positive for cough, shortness of breath and wheezing.         A few inhalers did not help but Dulera appears to be helping   Cardiovascular: Negative for chest pain, palpitations and leg swelling.   Gastrointestinal: Negative for constipation.   Musculoskeletal: Positive for joint pain.   Endo/Heme/Allergies: Positive for environmental allergies.   All other systems reviewed and are negative.       Objective:   /84   Pulse 84   Ht 1.575 m (5' 2\")   Wt 123.8 kg (273 lb)   SpO2 91%   BMI 49.93 kg/m²     Physical Exam   Constitutional: She is oriented to person, place, and time. No distress.   Obese   Neck: No JVD present.   Cardiovascular: Normal rate and regular rhythm.   Occasional extrasystoles are present. Exam reveals distant heart sounds. Exam reveals no gallop.    No murmur heard.  Pulmonary/Chest: Effort normal. No respiratory distress. She has decreased breath sounds. She has wheezes in the right lower field and the left lower field. She has no rales.   Abdominal: Soft. She exhibits no distension. There is no tenderness.   Musculoskeletal: She exhibits no edema.   Neurological: She is alert and oriented to person, place, and time.   Skin: Skin is warm and dry. No erythema.   Psychiatric: She has a normal mood and affect. Her behavior is normal.     EKG today by my review showed sinus rhythm, PACs and PVC, left axis deviation, low voltages in limb leads and late transition    CT chest in April;  Thoracic aorta shows mild atherosclerotic changes.  No mediastinal mass or " adenopathy.  Central pulmonary vessels are prominent.  No filling defects within the pulmonary arteries to indicate emboli.  No focal pulmonary consolidation.  No pleural fluid collection or pneumothorax.  Linear consolidation in the RIGHT middle lobe and lingula.    Labs in April , , CMP normal except calcium 11    Assessment:     1. Dyspnea on exertion  EKG    NM-CARDIAC STRESS TEST   2. Essential hypertension     3. Mixed hyperlipidemia         Medical Decision Making:  Today's Assessment / Status / Plan:     Her dyspnea on exertion is probably mostly from her pulmonary issues.  Given her dyslipidemia, HTN and her weight along with evidence of atherosclerosis on CT chest, will rule our angina equivalent will stress MPI.  Risk factor modification and weight reduction is certainly advised.  I did not start her on any new medication at this time but will need f/u on her lipid and adjust statin accordingly.  We will keep you posted about our findings and further recommendations as they become available. Please also do not hesitate to call for any questions.  Thank you kindly for allowing me to participate in the care of this patient.

## 2018-07-06 NOTE — LETTER
[facility]   [address], [address]  [place] [address]  Phone: [phone]  Fax: [phone]              Mallorie Xie  1955    Encounter Date: 7/6/2018    Kuldeep Dominguez M.D.          CARDIOLOGY CONSULTATION NOTE:  Chief Complaint   Patient presents with   • Shortness of Breath       Subjective:   Mallorie Xie is a 63 y.o. female who presents today for evaluation of shortness of breath    She is seen in consultation at the request of NOLA Hernandez for above issue    She was smoking 1 PPD for 47 years.   She has been having dyspnea on short distant walk for 2 years or so.  She was diagnosed with COPD based on PFT and sleep apnea.  She has been prescribed several bronchodilators over the year but did not get much improvement perhaps with the exception of current one (Dulera).  She would still get hypoxic with <200 feet of normal paced walk.  She denies chest discomfort, orthopnea, PND or edema.    She has also been prescribed CPAP but she does not like putting it on.  She has also gained substantial amount of weight this past year.    She underwent ECHO in May;  Limited images were obtained secondary to patient pain.  Normal left ventricular systolic function.  Mild, concentric left ventricular hypertrophy.  Aortic sclerosis without stenosis.    Has never had stress test so far.    Older sister with pacemaker.    Past Medical History:   Diagnosis Date   • Asthma    • Bowel habit changes    • Breast cancer (HCC)     2017   • Breath shortness    • Cancer (HCC)     Breast   • Chickenpox    • Colon polyps     2017   • Congestive heart failure (HCC)    • Dental disorder     Full upper/lower dentures.   • Emphysema of lung (HCC)    • Essential hypertension 3/31/2017   • Heart burn    • Indigestion    • Mumps    • Obesity    • Sleep apnea     CPAP use.   • Tobacco abuse      Past Surgical History:   Procedure Laterality Date   • MASTECTOMY  Right 10/24/2017    Procedure: MASTECTOMY- PARTIAL, WIRE LOCALIZED;  Surgeon: Marina Mansfield M.D.;  Location: SURGERY SAME DAY [facility];  Service: General   • COLONOSCOPY       Family History   Problem Relation Age of Onset   • No Known Problems Daughter    • Cancer Sister      cervical   • Cancer Sister      renal   • Cancer Maternal Uncle      lung   • Cancer Maternal Uncle      melanoma   • Sleep Apnea Neg Hx      Social History     Social History   • Marital status:      Spouse name: N/A   • Number of children: N/A   • Years of education: N/A     Occupational History   • Not on file.     Social History Main Topics   • Smoking status: Former Smoker     Packs/day: 1.00     Years: 47.00     Types: Cigarettes     Quit date: 12/2/2016   • Smokeless tobacco: Never Used   • Alcohol use No   • Drug use: No   • Sexual activity: Not on file     Other Topics Concern   • Not on file     Social History Narrative   • No narrative on file     Allergies   Allergen Reactions   • Bee Venom Anaphylaxis     Outpatient Encounter Prescriptions as of 7/6/2018   Medication Sig Dispense Refill   • Homeopathic Products (ZICAM COLD REMEDY PO) Take  by mouth.     • aspirin 81 MG tablet Take 81 mg by mouth every day.     • loratadine (CLARITIN) 10 MG Tab Take 10 mg by mouth every day.     • Mometasone Furo-Formoterol Fum (DULERA) 200-5 MCG/ACT Aerosol Inhale 2 Puffs by mouth 2 Times a Day. Use spacer. Rinse mouth after use. 3 Inhaler 3   • levothyroxine (SYNTHROID) 50 MCG Tab Take 1 Tab by mouth Every morning on an empty stomach. 90 Tab 1   • atorvastatin (LIPITOR) 40 MG Tab Take 1 Tab by mouth every day. 90 Tab 1   • Multiple Vitamins-Minerals (MULTIVITAMIN ADULT PO) Take  by mouth.     • albuterol 108 (90 Base) MCG/ACT Aero Soln inhalation aerosol Inhale 2 Puffs by mouth every 6 hours as needed for Shortness of Breath. 1 Inhaler 3   • lisinopril-hydrochlorothiazide (PRINZIDE, ZESTORETIC) 10-12.5 MG per tablet Take 1 Tab by  "mouth every day. 90 Tab 2   • MELATONIN PO Take 1 tablet by mouth at bedtime as needed.     • Cetirizine HCl (ZYRTEC ALLERGY) 10 MG Cap Take  by mouth.     • Spacer/Aero-Holding Chambers (COMPACT SPACE CHAMBER) Device      • NON SPECIFIED Please administer Zostivax 1 Each 0   • triamcinolone acetonide (KENALOG) 0.1 % Cream Twice daily for 2 weeks at a time (Patient not taking: Reported on 7/6/2018) 30 g 1     No facility-administered encounter medications on file as of 7/6/2018.      Review of Systems   Constitutional: Positive for malaise/fatigue.        Gained 100 pounds this past year   Respiratory: Positive for cough, shortness of breath and wheezing.         A few inhalers did not help but Dulera appears to be helping   Cardiovascular: Negative for chest pain, palpitations and leg swelling.   Gastrointestinal: Negative for constipation.   Musculoskeletal: Positive for joint pain.   Endo/Heme/Allergies: Positive for environmental allergies.   All other systems reviewed and are negative.       Objective:   /84   Pulse 84   Ht 1.575 m (5' 2\")   Wt 123.8 kg (273 lb)   SpO2 91%   BMI 49.93 kg/m²      Physical Exam   Constitutional: She is oriented to person, place, and time. No distress.   Obese   Neck: No JVD present.   Cardiovascular: Normal rate and regular rhythm.   Occasional extrasystoles are present. Exam reveals distant heart sounds. Exam reveals no gallop.    No murmur heard.  Pulmonary/Chest: Effort normal. No respiratory distress. She has decreased breath sounds. She has wheezes in the right lower field and the left lower field. She has no rales.   Abdominal: Soft. She exhibits no distension. There is no tenderness.   Musculoskeletal: She exhibits no edema.   Neurological: She is alert and oriented to person, place, and time.   Skin: Skin is warm and dry. No erythema.   Psychiatric: She has a normal mood and affect. Her behavior is normal.       Assessment:     1. Dyspnea on exertion  EKG    " NM-CARDIAC STRESS TEST   2. Essential hypertension     3. Mixed hyperlipidemia         Medical Decision Making:  Today's Assessment / Status / Plan:            No Recipients

## 2018-07-09 DIAGNOSIS — J43.9 PULMONARY EMPHYSEMA, UNSPECIFIED EMPHYSEMA TYPE (HCC): ICD-10-CM

## 2018-07-09 DIAGNOSIS — R60.9 EDEMA, UNSPECIFIED TYPE: ICD-10-CM

## 2018-07-09 DIAGNOSIS — E78.2 MIXED HYPERLIPIDEMIA: ICD-10-CM

## 2018-07-09 DIAGNOSIS — R21 RASH: ICD-10-CM

## 2018-07-09 DIAGNOSIS — R79.89 ABNORMAL TSH: ICD-10-CM

## 2018-07-09 DIAGNOSIS — R06.02 SHORTNESS OF BREATH: ICD-10-CM

## 2018-07-09 NOTE — TELEPHONE ENCOUNTER
See previous email from pt. Please call pt and ask if she has signed up for [facility] yet? If so, we can probably electronically send all her prescriptions.

## 2018-07-17 RX ORDER — LISINOPRIL AND HYDROCHLOROTHIAZIDE 12.5; 1 MG/1; MG/1
1 TABLET ORAL DAILY
Qty: 90 TAB | Refills: 2 | Status: SHIPPED | OUTPATIENT
Start: 2018-07-17 | End: 2018-12-27 | Stop reason: SDUPTHER

## 2018-07-17 RX ORDER — INHALER, ASSIST DEVICES
1 SPACER (EA) MISCELLANEOUS
Qty: 1 DEVICE | Refills: 1 | Status: SHIPPED | OUTPATIENT
Start: 2018-07-17 | End: 2023-01-01

## 2018-07-17 RX ORDER — ATORVASTATIN CALCIUM 40 MG/1
40 TABLET, FILM COATED ORAL DAILY
Qty: 90 TAB | Refills: 1 | Status: SHIPPED | OUTPATIENT
Start: 2018-07-17 | End: 2018-12-27 | Stop reason: SDUPTHER

## 2018-07-17 RX ORDER — LEVOTHYROXINE SODIUM 0.05 MG/1
50 TABLET ORAL
Qty: 90 TAB | Refills: 1 | Status: SHIPPED | OUTPATIENT
Start: 2018-07-17 | End: 2018-12-27 | Stop reason: SDUPTHER

## 2018-07-17 RX ORDER — ALBUTEROL SULFATE 90 UG/1
2 AEROSOL, METERED RESPIRATORY (INHALATION) EVERY 6 HOURS PRN
Qty: 1 INHALER | Refills: 3 | Status: SHIPPED | OUTPATIENT
Start: 2018-07-17 | End: 2018-07-23 | Stop reason: CLARIF

## 2018-07-18 DIAGNOSIS — R06.02 SHORTNESS OF BREATH: ICD-10-CM

## 2018-07-18 DIAGNOSIS — J43.9 PULMONARY EMPHYSEMA, UNSPECIFIED EMPHYSEMA TYPE (HCC): ICD-10-CM

## 2018-07-18 RX ORDER — ALBUTEROL SULFATE 90 UG/1
2 AEROSOL, METERED RESPIRATORY (INHALATION) EVERY 6 HOURS PRN
Qty: 1 INHALER | Refills: 3 | Status: CANCELLED | OUTPATIENT
Start: 2018-07-18

## 2018-07-18 NOTE — TELEPHONE ENCOUNTER
Was the patient seen in the last year in this department? Yes     Does patient have an active prescription for medications requested? No     Received Request Via: Patient     Please see message from patient.

## 2018-07-18 NOTE — TELEPHONE ENCOUNTER
----- Message from Mallorie Xie sent at 7/17/2018  7:42 PM PDT -----  Regarding: Prescription Question  Contact: [phone]  Dharmesh Sheikh,  I am requesting you amend the inhaler RX you ordered to a 90 day supply because singly they are $40, a 90 day supply is $80, saving me $40.      Thank You,  Kori

## 2018-07-23 DIAGNOSIS — R06.02 SHORTNESS OF BREATH: ICD-10-CM

## 2018-07-23 DIAGNOSIS — J43.9 PULMONARY EMPHYSEMA, UNSPECIFIED EMPHYSEMA TYPE (HCC): ICD-10-CM

## 2018-07-23 RX ORDER — ALBUTEROL SULFATE 90 UG/1
2 AEROSOL, METERED RESPIRATORY (INHALATION) EVERY 6 HOURS PRN
Qty: 8.5 G | Refills: 3 | Status: SHIPPED | OUTPATIENT
Start: 2018-07-23 | End: 2018-12-27 | Stop reason: SDUPTHER

## 2018-08-03 ENCOUNTER — HOSPITAL ENCOUNTER (OUTPATIENT)
Dept: LAB | Facility: MEDICAL CENTER | Age: 63
End: 2018-08-03
Attending: NURSE PRACTITIONER
Payer: COMMERCIAL

## 2018-08-03 ENCOUNTER — APPOINTMENT (OUTPATIENT)
Dept: RADIOLOGY | Facility: MEDICAL CENTER | Age: 63
End: 2018-08-03
Attending: INTERNAL MEDICINE
Payer: COMMERCIAL

## 2018-08-03 ENCOUNTER — HOSPITAL ENCOUNTER (OUTPATIENT)
Dept: RADIOLOGY | Facility: MEDICAL CENTER | Age: 63
End: 2018-08-03
Attending: NURSE PRACTITIONER
Payer: COMMERCIAL

## 2018-08-03 DIAGNOSIS — D05.11 INTRADUCTAL CARCINOMA IN SITU OF RIGHT BREAST: ICD-10-CM

## 2018-08-03 DIAGNOSIS — E78.2 MIXED HYPERLIPIDEMIA: ICD-10-CM

## 2018-08-03 LAB
CHOLEST SERPL-MCNC: 136 MG/DL (ref 100–199)
HDLC SERPL-MCNC: 48 MG/DL
LDLC SERPL CALC-MCNC: 65 MG/DL
TRIGL SERPL-MCNC: 117 MG/DL (ref 0–149)

## 2018-08-03 PROCEDURE — 80061 LIPID PANEL: CPT

## 2018-08-03 PROCEDURE — 36415 COLL VENOUS BLD VENIPUNCTURE: CPT

## 2018-08-03 PROCEDURE — G0279 TOMOSYNTHESIS, MAMMO: HCPCS

## 2018-09-04 ENCOUNTER — HOSPITAL ENCOUNTER (OUTPATIENT)
Dept: RADIATION ONCOLOGY | Facility: MEDICAL CENTER | Age: 63
End: 2018-09-30
Attending: RADIOLOGY
Payer: COMMERCIAL

## 2018-09-04 VITALS
TEMPERATURE: 98.2 F | WEIGHT: 278.5 LBS | SYSTOLIC BLOOD PRESSURE: 122 MMHG | DIASTOLIC BLOOD PRESSURE: 73 MMHG | OXYGEN SATURATION: 90 % | HEART RATE: 85 BPM | BODY MASS INDEX: 50.94 KG/M2

## 2018-09-04 PROCEDURE — 99212 OFFICE O/P EST SF 10 MIN: CPT | Performed by: RADIOLOGY

## 2018-09-04 PROCEDURE — 99213 OFFICE O/P EST LOW 20 MIN: CPT | Performed by: RADIOLOGY

## 2018-09-04 ASSESSMENT — PAIN SCALES - GENERAL: PAINLEVEL: NO PAIN

## 2018-09-04 NOTE — NON-PROVIDER
Patient was seen today in clinic with Dr. Boateng for Follow up.  Vitals signs and weight were obtained and pain assessment was completed.  Allergies and medications were reviewed with the patient.  Toxicities of treatment assessed.     Vitals/Pain:  Vitals:    09/04/18 0929   BP: 122/73   Pulse: 85   Temp: 36.8 °C (98.2 °F)   SpO2: 90%   Weight: (!) 126.3 kg (278 lb 8 oz)   Pain Score: No pain        Allergies:   Bee venom    Current Medications:  Current Outpatient Prescriptions   Medication Sig Dispense Refill   • albuterol (VENTOLIN HFA) 108 (90 Base) MCG/ACT Aero Soln inhalation aerosol Inhale 2 Puffs by mouth every 6 hours as needed for Shortness of Breath. 8.5 g 3   • levothyroxine (SYNTHROID) 50 MCG Tab Take 1 Tab by mouth Every morning on an empty stomach. 90 Tab 1   • lisinopril-hydrochlorothiazide (PRINZIDE, ZESTORETIC) 10-12.5 MG per tablet Take 1 Tab by mouth every day. 90 Tab 2   • atorvastatin (LIPITOR) 40 MG Tab Take 1 Tab by mouth every day. 90 Tab 1   • Spacer/Aero-Holding Chambers (COMPACT SPACE CHAMBER) Device Inhale 1 Units by mouth 1 time daily as needed. 1 Device 1   • Homeopathic Products (ZICAM COLD REMEDY PO) Take  by mouth.     • aspirin 81 MG tablet Take 81 mg by mouth every day.     • loratadine (CLARITIN) 10 MG Tab Take 10 mg by mouth every day.     • Cetirizine HCl (ZYRTEC ALLERGY) 10 MG Cap Take  by mouth.     • Mometasone Furo-Formoterol Fum (DULERA) 200-5 MCG/ACT Aerosol Inhale 2 Puffs by mouth 2 Times a Day. Use spacer. Rinse mouth after use. 3 Inhaler 3   • Multiple Vitamins-Minerals (MULTIVITAMIN ADULT PO) Take  by mouth.     • NON SPECIFIED Please administer Zostivax 1 Each 0   • triamcinolone acetonide (KENALOG) 0.1 % Cream Twice daily for 2 weeks at a time (Patient not taking: Reported on 7/6/2018) 30 g 1   • MELATONIN PO Take 1 tablet by mouth at bedtime as needed.       No current facility-administered medications for this encounter.          PCP:  Dean HUMPHRIES  Anna, Med Ass't

## 2018-09-04 NOTE — PROGRESS NOTES
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      INTERVAL HISTORY: I had the pleasure of seeing Ms. Xie today in follow up for her breast cancer.  Patient states from a symptom standpoint she continues to do quite well.  She has not had any symptoms or signs she would attribute to her breast cancer or breast cancer treatment.  As you recall she met with medical oncology regarding an aromatase inhibitor declined the medication.  She had her recent annual mammogram that shows no evidence of malignancy.  She continues to follow with her APRN and is quite happy with her care.  Her question today relates to her need to continue to follow in oncology.  She would like to transition her care back to her APRN for both her annual mammogram and annual breast exam.    PHYSICAL EXAM:    Vitals:    09/04/18 0929   BP: 122/73   Pulse: 85   Temp: 36.8 °C (98.2 °F)   SpO2: 90%   Weight: (!) 126.3 kg (278 lb 8 oz)   Pain Score: No pain      1= Restricted in physically strenuous activity, but ambulatory and able to carry out work of a light sedentary nature, e.g., light housework, office work.        GENERAL: No apparent distress.  HEENT:  Pupils are equal, round, and reactive to light.  Extraocular muscles   are intact. Sclerae nonicteric.  Conjunctivae pink.  Oral cavity, tongue   protrudes midline.   NECK:  Supple without evidence of thyromegaly.  NODES:  No peripheral adenopathy of the neck, supraclavicular fossa or axillae   bilaterally.  LUNGS:  Clear to ascultation bilaterally   HEART:  Regular rate and rhythm.  No murmur appreciated  BREAST: No suspicious lesions found in either breast or axilla.  The site of the implant has healed well without  any skin breakdown.  There are not any areas of palpable abnormality in either breast.  ABDOMEN:  Soft. No evidence of hepatosplenomegaly.  Positive bowel sounds.  EXTREMITIES:  Without Edema.  NEUROLOGIC:  Cranial nerves II through XII were intact. 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      RECOMMENDATIONS:   I discussed the patient her findings over a 15 minute time period, 95% of that time dedicated to an ongoing survivorship plan.  Discussed with patient the data surrounding follow-up and survivorship.  There is clear data suggesting minimizing the number of active engaged care providers for follow-up.  There is nothing unique in her case that would require oncology services.  Therefore she is going to follow with her APRN.  She understands she only requires an annual mammogram and annual breast exam.  There is not any requirement for blood work or other testing.    If patient has any questions or concerns, she should feel free to contact me.

## 2018-10-13 DIAGNOSIS — J96.90 RESPIRATORY FAILURE, UNSPECIFIED CHRONICITY, UNSPECIFIED WHETHER WITH HYPOXIA OR HYPERCAPNIA (HCC): ICD-10-CM

## 2018-10-15 RX ORDER — MOMETASONE FUROATE AND FORMOTEROL FUMARATE DIHYDRATE 200; 5 UG/1; UG/1
AEROSOL RESPIRATORY (INHALATION)
Qty: 1 INHALER | Refills: 11 | Status: SHIPPED | OUTPATIENT
Start: 2018-10-15 | End: 2018-10-22

## 2018-10-15 NOTE — TELEPHONE ENCOUNTER
Have we ever prescribed this med? Yes.  If yes, what date? 05/24/2018    Last OV: 06/05/2018 - Janett Hughes    Next OV: NONE SCHEDULED; NOT DUE TILL December 2018    DX: Chronic resp failure     Medications: Dulera

## 2018-10-22 DIAGNOSIS — J43.9 PULMONARY EMPHYSEMA, UNSPECIFIED EMPHYSEMA TYPE (HCC): ICD-10-CM

## 2018-10-22 RX ORDER — BUDESONIDE AND FORMOTEROL FUMARATE DIHYDRATE 160; 4.5 UG/1; UG/1
2 AEROSOL RESPIRATORY (INHALATION) 2 TIMES DAILY
Qty: 1 INHALER | Refills: 5 | Status: SHIPPED | OUTPATIENT
Start: 2018-10-22 | End: 2018-12-27 | Stop reason: SDUPTHER

## 2018-10-22 NOTE — TELEPHONE ENCOUNTER
----- Message from Mallorie Xie sent at 10/19/2018  4:19 PM PDT -----  Regarding: Prescription Question  Contact: [phone]  Dharmesh Rowland,  I retired today & am signing up for Medicare, including part D and have discovered no one covers the Dulera except 1 insurance company & I will not be able to afford the premium.  They all cover Symbicort.  Is there any problem for me going back to Symbicort?    Kori

## 2019-08-05 DIAGNOSIS — I10 ESSENTIAL HYPERTENSION: ICD-10-CM

## 2019-08-05 DIAGNOSIS — E04.1 THYROID NODULE: ICD-10-CM

## 2019-08-05 DIAGNOSIS — E03.9 ACQUIRED HYPOTHYROIDISM: ICD-10-CM

## 2019-08-05 DIAGNOSIS — Z13.0 ENCOUNTER FOR SCREENING FOR HEMATOLOGIC DISORDER: ICD-10-CM

## 2019-08-27 ENCOUNTER — HOSPITAL ENCOUNTER (OUTPATIENT)
Dept: LAB | Facility: MEDICAL CENTER | Age: 64
End: 2019-08-27
Attending: NURSE PRACTITIONER
Payer: MEDICARE

## 2019-08-27 ENCOUNTER — HOSPITAL ENCOUNTER (OUTPATIENT)
Dept: RADIOLOGY | Facility: MEDICAL CENTER | Age: 64
End: 2019-08-27
Attending: NURSE PRACTITIONER
Payer: MEDICARE

## 2019-08-27 DIAGNOSIS — E03.9 ACQUIRED HYPOTHYROIDISM: ICD-10-CM

## 2019-08-27 DIAGNOSIS — Z13.0 ENCOUNTER FOR SCREENING FOR HEMATOLOGIC DISORDER: ICD-10-CM

## 2019-08-27 DIAGNOSIS — E04.1 THYROID NODULE: ICD-10-CM

## 2019-08-27 DIAGNOSIS — Z12.31 VISIT FOR SCREENING MAMMOGRAM: ICD-10-CM

## 2019-08-27 DIAGNOSIS — I10 ESSENTIAL HYPERTENSION: ICD-10-CM

## 2019-08-27 LAB
ALBUMIN SERPL BCP-MCNC: 4.5 G/DL (ref 3.2–4.9)
ALBUMIN/GLOB SERPL: 1.7 G/DL
ALP SERPL-CCNC: 89 U/L (ref 30–99)
ALT SERPL-CCNC: 38 U/L (ref 2–50)
ANION GAP SERPL CALC-SCNC: 9 MMOL/L (ref 0–11.9)
AST SERPL-CCNC: 26 U/L (ref 12–45)
BASOPHILS # BLD AUTO: 0.5 % (ref 0–1.8)
BASOPHILS # BLD: 0.04 K/UL (ref 0–0.12)
BILIRUB SERPL-MCNC: 0.6 MG/DL (ref 0.1–1.5)
BUN SERPL-MCNC: 15 MG/DL (ref 8–22)
CALCIUM SERPL-MCNC: 10.6 MG/DL (ref 8.5–10.5)
CHLORIDE SERPL-SCNC: 103 MMOL/L (ref 96–112)
CHOLEST SERPL-MCNC: 151 MG/DL (ref 100–199)
CO2 SERPL-SCNC: 28 MMOL/L (ref 20–33)
CREAT SERPL-MCNC: 0.68 MG/DL (ref 0.5–1.4)
EOSINOPHIL # BLD AUTO: 0.17 K/UL (ref 0–0.51)
EOSINOPHIL NFR BLD: 2 % (ref 0–6.9)
ERYTHROCYTE [DISTWIDTH] IN BLOOD BY AUTOMATED COUNT: 47.1 FL (ref 35.9–50)
FASTING STATUS PATIENT QL REPORTED: NORMAL
GLOBULIN SER CALC-MCNC: 2.6 G/DL (ref 1.9–3.5)
GLUCOSE SERPL-MCNC: 94 MG/DL (ref 65–99)
HCT VFR BLD AUTO: 40.1 % (ref 37–47)
HDLC SERPL-MCNC: 48 MG/DL
HGB BLD-MCNC: 12.5 G/DL (ref 12–16)
IMM GRANULOCYTES # BLD AUTO: 0.03 K/UL (ref 0–0.11)
IMM GRANULOCYTES NFR BLD AUTO: 0.4 % (ref 0–0.9)
LDLC SERPL CALC-MCNC: 72 MG/DL
LYMPHOCYTES # BLD AUTO: 1.38 K/UL (ref 1–4.8)
LYMPHOCYTES NFR BLD: 16.1 % (ref 22–41)
MCH RBC QN AUTO: 30.1 PG (ref 27–33)
MCHC RBC AUTO-ENTMCNC: 31.2 G/DL (ref 33.6–35)
MCV RBC AUTO: 96.6 FL (ref 81.4–97.8)
MONOCYTES # BLD AUTO: 0.63 K/UL (ref 0–0.85)
MONOCYTES NFR BLD AUTO: 7.4 % (ref 0–13.4)
NEUTROPHILS # BLD AUTO: 6.32 K/UL (ref 2–7.15)
NEUTROPHILS NFR BLD: 73.6 % (ref 44–72)
NRBC # BLD AUTO: 0 K/UL
NRBC BLD-RTO: 0 /100 WBC
PLATELET # BLD AUTO: 217 K/UL (ref 164–446)
PMV BLD AUTO: 12 FL (ref 9–12.9)
POTASSIUM SERPL-SCNC: 3.6 MMOL/L (ref 3.6–5.5)
PROT SERPL-MCNC: 7.1 G/DL (ref 6–8.2)
RBC # BLD AUTO: 4.15 M/UL (ref 4.2–5.4)
SODIUM SERPL-SCNC: 140 MMOL/L (ref 135–145)
T4 FREE SERPL-MCNC: 1.05 NG/DL (ref 0.53–1.43)
TRIGL SERPL-MCNC: 156 MG/DL (ref 0–149)
TSH SERPL DL<=0.005 MIU/L-ACNC: 8.8 UIU/ML (ref 0.38–5.33)
WBC # BLD AUTO: 8.6 K/UL (ref 4.8–10.8)

## 2019-08-27 PROCEDURE — 85025 COMPLETE CBC W/AUTO DIFF WBC: CPT

## 2019-08-27 PROCEDURE — 80061 LIPID PANEL: CPT

## 2019-08-27 PROCEDURE — 84443 ASSAY THYROID STIM HORMONE: CPT

## 2019-08-27 PROCEDURE — 36415 COLL VENOUS BLD VENIPUNCTURE: CPT

## 2019-08-27 PROCEDURE — 77063 BREAST TOMOSYNTHESIS BI: CPT

## 2019-08-27 PROCEDURE — 80053 COMPREHEN METABOLIC PANEL: CPT

## 2019-08-27 PROCEDURE — 84439 ASSAY OF FREE THYROXINE: CPT

## 2019-09-03 ENCOUNTER — OFFICE VISIT (OUTPATIENT)
Dept: MEDICAL GROUP | Facility: PHYSICIAN GROUP | Age: 64
End: 2019-09-03
Payer: MEDICARE

## 2019-09-03 VITALS
WEIGHT: 274.6 LBS | BODY MASS INDEX: 46.88 KG/M2 | RESPIRATION RATE: 20 BRPM | HEIGHT: 64 IN | HEART RATE: 86 BPM | SYSTOLIC BLOOD PRESSURE: 136 MMHG | DIASTOLIC BLOOD PRESSURE: 86 MMHG | TEMPERATURE: 97.8 F | OXYGEN SATURATION: 95 %

## 2019-09-03 DIAGNOSIS — E83.52 HYPERCALCEMIA: ICD-10-CM

## 2019-09-03 DIAGNOSIS — E66.01 MORBID OBESITY WITH BMI OF 45.0-49.9, ADULT (HCC): ICD-10-CM

## 2019-09-03 DIAGNOSIS — J43.9 PULMONARY EMPHYSEMA, UNSPECIFIED EMPHYSEMA TYPE (HCC): ICD-10-CM

## 2019-09-03 DIAGNOSIS — E03.9 ACQUIRED HYPOTHYROIDISM: ICD-10-CM

## 2019-09-03 DIAGNOSIS — Z12.11 SCREENING FOR COLORECTAL CANCER: ICD-10-CM

## 2019-09-03 DIAGNOSIS — J96.11 CHRONIC RESPIRATORY FAILURE WITH HYPOXIA (HCC): ICD-10-CM

## 2019-09-03 DIAGNOSIS — Z11.59 NEED FOR HEPATITIS C SCREENING TEST: ICD-10-CM

## 2019-09-03 DIAGNOSIS — Z12.12 SCREENING FOR COLORECTAL CANCER: ICD-10-CM

## 2019-09-03 DIAGNOSIS — D05.11 INTRADUCTAL CARCINOMA IN SITU OF RIGHT BREAST: ICD-10-CM

## 2019-09-03 PROCEDURE — 99214 OFFICE O/P EST MOD 30 MIN: CPT | Performed by: NURSE PRACTITIONER

## 2019-09-03 ASSESSMENT — PAIN SCALES - GENERAL: PAINLEVEL: NO PAIN

## 2019-09-03 ASSESSMENT — PATIENT HEALTH QUESTIONNAIRE - PHQ9: CLINICAL INTERPRETATION OF PHQ2 SCORE: 0

## 2019-09-03 NOTE — PATIENT INSTRUCTIONS
Will have you increase the thyroid to 2 pills once a week, 1 pill daily on all other days    Labs in 8 weeks    Follow up as determined by lab results

## 2019-09-03 NOTE — PROGRESS NOTES
Chief Complaint   Patient presents with   • Annual Exam     yearly   • Labs Only   • Other     declines immunization         This is a 64 y.o.female patient that presents today with the following: Annual exam, referrals, lab review    Acquired hypothyroidism  This is a chronic condition, suboptimally controlled on current dose of levothyroxine, currently takes 50 mg daily.  TSH mildly increased at 8.80, T4 within normal limits.  We are going to have her continue on 50 mg daily for 6 days a week and on the seventh day she will take 2 pills.  She is going to follow-up in 8 weeks with repeat TSH.    Chronic respiratory failure with hypoxia (HCC)  Patient has chronic respiratory failure secondary to morbid obesity as well as COPD including pulmonary emphysema.  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  In office her O2 saturations at rest on room air was 95%, with exertion O2 saturations dropped to 86% and after applying O2 and with exertion her saturations increased to 92%.  She does qualify for O2 per Medicare guidelines, new prescription sent to her new DME of choice.  She was advised to follow-up with pulmonology for ongoing evaluation and treatment of her chronic respiratory failure secondary to COPD.  She is also advised to follow-up with them secondary to her LUIS.    Intraductal carcinoma in situ of right breast  Patient does have history of carcinoma of the right breast for which she has had lumpectomy but declined further treatment associated with this.  She is followed by oncology and was advised to follow-up in 1 year.    Morbid obesity with BMI of 45.0-49.9, adult (HCC)  This is a chronic condition, patient continues to work on this.  Weight is 274 pounds, BMI is 47.13.  She does understand the risks associated with her weight especially in the setting of her comorbid conditions.      Hospital Outpatient Visit on 08/27/2019   Component Date Value   • TSH 08/27/2019 8.800*   • Cholesterol,Tot  08/27/2019 151    • Triglycerides 08/27/2019 156*   • HDL 08/27/2019 48    • LDL 08/27/2019 72    • WBC 08/27/2019 8.6    • RBC 08/27/2019 4.15*   • Hemoglobin 08/27/2019 12.5    • Hematocrit 08/27/2019 40.1    • MCV 08/27/2019 96.6    • MCH 08/27/2019 30.1    • MCHC 08/27/2019 31.2*   • RDW 08/27/2019 47.1    • Platelet Count 08/27/2019 217    • MPV 08/27/2019 12.0    • Neutrophils-Polys 08/27/2019 73.60*   • Lymphocytes 08/27/2019 16.10*   • Monocytes 08/27/2019 7.40    • Eosinophils 08/27/2019 2.00    • Basophils 08/27/2019 0.50    • Immature Granulocytes 08/27/2019 0.40    • Nucleated RBC 08/27/2019 0.00    • Neutrophils (Absolute) 08/27/2019 6.32    • Lymphs (Absolute) 08/27/2019 1.38    • Monos (Absolute) 08/27/2019 0.63    • Eos (Absolute) 08/27/2019 0.17    • Baso (Absolute) 08/27/2019 0.04    • Immature Granulocytes (a* 08/27/2019 0.03    • NRBC (Absolute) 08/27/2019 0.00    • Sodium 08/27/2019 140    • Potassium 08/27/2019 3.6    • Chloride 08/27/2019 103    • Co2 08/27/2019 28    • Anion Gap 08/27/2019 9.0    • Glucose 08/27/2019 94    • Bun 08/27/2019 15    • Creatinine 08/27/2019 0.68    • Calcium 08/27/2019 10.6*   • AST(SGOT) 08/27/2019 26    • ALT(SGPT) 08/27/2019 38    • Alkaline Phosphatase 08/27/2019 89    • Total Bilirubin 08/27/2019 0.6    • Albumin 08/27/2019 4.5    • Total Protein 08/27/2019 7.1    • Globulin 08/27/2019 2.6    • A-G Ratio 08/27/2019 1.7    • Fasting Status 08/27/2019 Fasting    • Free T-4 08/27/2019 1.05    • GFR If  08/27/2019 >60    • GFR If Non  Ameri* 08/27/2019 >60          clinical course has been stable    Past Medical History:   Diagnosis Date   • Asthma    • Bowel habit changes    • Breast cancer (HCC)     2017   • Breath shortness    • Cancer (HCC)     Breast   • Chickenpox    • Colon polyps     2017   • Congestive heart failure (HCC)    • Dental disorder     Full upper/lower dentures.   • Emphysema of lung (HCC)    • Essential hypertension  3/31/2017   • Heart burn    • Indigestion    • Mumps    • Obesity    • Sleep apnea     CPAP use.   • Tobacco abuse        Past Surgical History:   Procedure Laterality Date   • MASTECTOMY Right 10/24/2017    Procedure: MASTECTOMY- PARTIAL, WIRE LOCALIZED;  Surgeon: Marina Mansfield M.D.;  Location: SURGERY SAME DAY [facility];  Service: General   • COLONOSCOPY         Family History   Problem Relation Age of Onset   • No Known Problems Daughter    • Cancer Sister         cervical   • Cancer Sister         renal   • Cancer Maternal Uncle         lung   • Cancer Maternal Uncle         melanoma   • Sleep Apnea Neg Hx        Bee venom    Current Outpatient Medications Ordered in Epic   Medication Sig Dispense Refill   • PSYLLIUM HUSK PO      • Sennosides-Docusate Sodium (STOOL SOFTENER & LAXATIVE PO) Take  by mouth.     • levothyroxine (SYNTHROID) 50 MCG Tab Take 1 Tab by mouth Every morning on an empty stomach. 90 Tab 3   • lisinopril-hydrochlorothiazide (PRINZIDE, ZESTORETIC) 10-12.5 MG per tablet Take 1 Tab by mouth every day. 90 Tab 3   • atorvastatin (LIPITOR) 40 MG Tab Take 1 Tab by mouth every day. 90 Tab 3   • budesonide-formoterol (SYMBICORT) 160-4.5 MCG/ACT Aerosol Inhale 2 Puffs by mouth 2 Times a Day. Use spacer. Rinse mouth after each use. 6 Inhaler 3   • albuterol (VENTOLIN HFA) 108 (90 Base) MCG/ACT Aero Soln inhalation aerosol Inhale 2 Puffs by mouth every 6 hours as needed for Shortness of Breath. 8.5 g 3   • Spacer/Aero-Holding Chambers (COMPACT SPACE CHAMBER) Device Inhale 1 Units by mouth 1 time daily as needed. 1 Device 1   • aspirin 81 MG tablet Take 81 mg by mouth every day.     • loratadine (CLARITIN) 10 MG Tab Take 10 mg by mouth every day.     • Multiple Vitamins-Minerals (MULTIVITAMIN ADULT PO) Take  by mouth.     • MELATONIN PO Take 1 tablet by mouth at bedtime as needed.     • NON SPECIFIED Please administer Zostivax 1 Each 0     No current [facility]-ordered facility-administered medications on  "file.        Constitutional ROS: No unexpected change in weight, No weakness, No unexplained fevers, sweats, or chills  Pulmonary ROS: Positive per HPI  Cardiovascular ROS: No chest pain, No edema, No palpitations  Gastrointestinal ROS: No abdominal pain, No nausea, vomiting, diarrhea, or constipation  Breast ROS: Positive per HPI  Musculoskeletal/Extremities ROS: No clubbing, No peripheral edema, No pain, redness or swelling on the joints  Neurologic ROS: Normal development, No seizures, No weakness  Endocrine ROS: Positive per HPI    Physical exam:  /86 (BP Location: Left arm, Patient Position: Sitting, BP Cuff Size: Adult)   Pulse 86   Temp 36.6 °C (97.8 °F) (Temporal)   Resp 20   Ht 1.626 m (5' 4\")   Wt 124.6 kg (274 lb 9.6 oz)   SpO2 95%   Breastfeeding? No   BMI 47.13 kg/m²   General Appearance: Pleasant older female, alert, no distress, morbidly obese, well-groomed  Skin: Skin color, texture, turgor normal. No rashes or lesions.  Lungs: negative findings: normal respiratory rate and rhythm, lungs clear to auscultation  Heart: negative. RRR without murmur, gallop, or rubs.  No ectopy.  Abdomen: Abdomen soft, non-tender. BS normal. No masses,  No organomegaly  Musculoskeletal: negative findings: no evidence of joint instability, no evidence of muscle atrophy, no deformities present  Neurologic: intact, CN II through XII grossly intact    Medical decision making/discussion: Patient will have repeat labs in 8 weeks, she is going to increase her levothyroxine to 2 pills (100 mg) once a week and on all other days take 50 mcg daily.  Referral has been placed to gastroenterology for screening colonoscopy.  She was advised to follow-up with specialists including pulmonology.  She is otherwise follow-up with me annually and as needed.    Mlalorie was seen today for annual exam, labs only and other.    Diagnoses and all orders for this visit:    Screening for colorectal cancer  -     REFERRAL TO GI FOR " COLONOSCOPY    Morbid obesity with BMI of 45.0-49.9, adult (HCC)  -     Patient identified as having weight management issue.  Appropriate orders and counseling given.    Intraductal carcinoma in situ of right breast    Chronic respiratory failure with hypoxia (HCC)    Acquired hypothyroidism  -     TSH WITH REFLEX TO FT4; Future    Pulmonary emphysema, unspecified emphysema type (HCC)    Hypercalcemia  -     IONIZED CALCIUM; Future    Need for hepatitis C screening test  -     HEP C VIRUS ANTIBODY; Future        Return if symptoms worsen or fail to improve, for Follow-up.        Please note that this dictation was created using voice recognition software. I have made every reasonable attempt to correct obvious errors, but I expect that there are errors of grammar and possibly content that I did not discover before finalizing the note.

## 2019-09-04 NOTE — ASSESSMENT & PLAN NOTE
Patient has chronic respiratory failure secondary to morbid obesity as well as COPD including pulmonary emphysema.  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  In office her O2 saturations at rest on room air was 95%, with exertion O2 saturations dropped to 86% and after applying O2 and with exertion her saturations increased to 92%.  She does qualify for O2 per Medicare guidelines, new prescription sent to her new DME of choice.  She was advised to follow-up with pulmonology for ongoing evaluation and treatment of her chronic respiratory failure secondary to COPD.  She is also advised to follow-up with them secondary to her LUIS.

## 2019-09-04 NOTE — ASSESSMENT & PLAN NOTE
This is a chronic condition, patient continues to work on this.  Weight is 274 pounds, BMI is 47.13.  She does understand the risks associated with her weight especially in the setting of her comorbid conditions.

## 2019-09-04 NOTE — ASSESSMENT & PLAN NOTE
Patient does have history of carcinoma of the right breast for which she has had lumpectomy but declined further treatment associated with this.  She is followed by oncology and was advised to follow-up in 1 year.

## 2019-09-04 NOTE — ASSESSMENT & PLAN NOTE
This is a chronic condition, suboptimally controlled on current dose of levothyroxine, currently takes 50 mg daily.  TSH mildly increased at 8.80, T4 within normal limits.  We are going to have her continue on 50 mg daily for 6 days a week and on the seventh day she will take 2 pills.  She is going to follow-up in 8 weeks with repeat TSH.

## 2019-12-18 DIAGNOSIS — R60.9 EDEMA, UNSPECIFIED TYPE: ICD-10-CM

## 2019-12-18 DIAGNOSIS — E78.2 MIXED HYPERLIPIDEMIA: ICD-10-CM

## 2019-12-18 DIAGNOSIS — R79.89 ABNORMAL TSH: ICD-10-CM

## 2019-12-18 RX ORDER — ATORVASTATIN CALCIUM 40 MG/1
TABLET, FILM COATED ORAL
Qty: 90 TAB | Refills: 1 | Status: SHIPPED | OUTPATIENT
Start: 2019-12-18 | End: 2020-05-23

## 2019-12-18 RX ORDER — LEVOTHYROXINE SODIUM 0.05 MG/1
TABLET ORAL
Qty: 90 TAB | Refills: 1 | Status: SHIPPED | OUTPATIENT
Start: 2019-12-18 | End: 2020-05-23

## 2019-12-18 RX ORDER — LISINOPRIL AND HYDROCHLOROTHIAZIDE 12.5; 1 MG/1; MG/1
1 TABLET ORAL DAILY
Qty: 90 TAB | Refills: 1 | Status: SHIPPED | OUTPATIENT
Start: 2019-12-18 | End: 2020-05-23

## 2019-12-18 NOTE — TELEPHONE ENCOUNTER
Was the patient seen in the last year in this department? Yes    Does patient have an active prescription for medications requested? No     Received Request Via: Pharmacy    Pt met protocol?: Yes     Last OV 09/03/19    BP Readings from Last 1 Encounters:   09/03/19 136/86     Lab Results   Component Value Date/Time    CHOLSTRLTOT 151 08/27/2019 0721    TRIGLYCERIDE 156 (H) 08/27/2019 0721    HDL 48 08/27/2019 0721    LDL 72 08/27/2019 0721       TSH   Date Value Ref Range Status   08/27/2019 8.800 (H) 0.380 - 5.330 uIU/mL Final     Comment:     Please note new reference ranges effective 12/14/2017 10:00 AM  Pregnant Females, 1st Trimester  0.050-3.700  Pregnant Females, 2nd Trimester  0.310-4.350  Pregnant Females, 3rd Trimester  0.410-5.180

## 2019-12-18 NOTE — TELEPHONE ENCOUNTER
Pt has had OV within the 12 month protocol and lipid panel is current. 6 month supply sent to pharmacy. Pt's thyroid was off and dose has been adjusted.   Lab Results   Component Value Date/Time    CHOLSTRLTOT 151 08/27/2019 07:21 AM    LDL 72 08/27/2019 07:21 AM    HDL 48 08/27/2019 07:21 AM    TRIGLYCERIDE 156 (H) 08/27/2019 07:21 AM       Lab Results   Component Value Date/Time    SODIUM 140 08/27/2019 07:21 AM    POTASSIUM 3.6 08/27/2019 07:21 AM    CHLORIDE 103 08/27/2019 07:21 AM    CO2 28 08/27/2019 07:21 AM    GLUCOSE 94 08/27/2019 07:21 AM    BUN 15 08/27/2019 07:21 AM    CREATININE 0.68 08/27/2019 07:21 AM     Lab Results   Component Value Date/Time    ALKPHOSPHAT 89 08/27/2019 07:21 AM    ASTSGOT 26 08/27/2019 07:21 AM    ALTSGPT 38 08/27/2019 07:21 AM    TBILIRUBIN 0.6 08/27/2019 07:21 AM

## 2020-05-22 DIAGNOSIS — R79.89 ABNORMAL TSH: ICD-10-CM

## 2020-05-22 DIAGNOSIS — R60.9 EDEMA, UNSPECIFIED TYPE: ICD-10-CM

## 2020-05-22 DIAGNOSIS — E78.2 MIXED HYPERLIPIDEMIA: ICD-10-CM

## 2020-05-23 RX ORDER — ATORVASTATIN CALCIUM 40 MG/1
TABLET, FILM COATED ORAL
Qty: 90 TAB | Refills: 1 | Status: SHIPPED | OUTPATIENT
Start: 2020-05-23 | End: 2021-01-06

## 2020-05-23 RX ORDER — LISINOPRIL AND HYDROCHLOROTHIAZIDE 12.5; 1 MG/1; MG/1
TABLET ORAL
Qty: 90 TAB | Refills: 1 | Status: SHIPPED | OUTPATIENT
Start: 2020-05-23 | End: 2021-01-06

## 2020-05-23 RX ORDER — LEVOTHYROXINE SODIUM 0.05 MG/1
TABLET ORAL
Qty: 30 TAB | Refills: 0 | Status: SHIPPED | OUTPATIENT
Start: 2020-05-23 | End: 2020-08-12

## 2020-08-22 ENCOUNTER — PATIENT MESSAGE (OUTPATIENT)
Dept: MEDICAL GROUP | Facility: PHYSICIAN GROUP | Age: 65
End: 2020-08-22

## 2020-08-24 NOTE — TELEPHONE ENCOUNTER
From: Mallorie Xie  To: KRISTAL Edmonds  Sent: 8/22/2020 7:18 PM PDT  Subject: Procedure Question    I received a notice today from [facility] to recertify my need for oxygen by 9/3/20. I am terrified to go to your office considering my health issues amid covid. I have an oximeter and a BP cuff. Could we please do this via telemed on the phone or computer? I can assure you I still need oxygen; without it I can't breathe. Please help me with a solution that doesn't require me to leave my home.    Respectfully,  Kori Xie

## 2020-08-26 ENCOUNTER — TELEMEDICINE (OUTPATIENT)
Dept: MEDICAL GROUP | Facility: PHYSICIAN GROUP | Age: 65
End: 2020-08-26
Payer: MEDICARE

## 2020-08-26 ENCOUNTER — NON-PROVIDER VISIT (OUTPATIENT)
Dept: MEDICAL GROUP | Facility: PHYSICIAN GROUP | Age: 65
End: 2020-08-26
Payer: MEDICARE

## 2020-08-26 DIAGNOSIS — I10 ESSENTIAL HYPERTENSION: ICD-10-CM

## 2020-08-26 DIAGNOSIS — E03.9 ACQUIRED HYPOTHYROIDISM: ICD-10-CM

## 2020-08-26 DIAGNOSIS — R79.89 ABNORMAL TSH: ICD-10-CM

## 2020-08-26 DIAGNOSIS — J96.11 CHRONIC RESPIRATORY FAILURE WITH HYPOXIA (HCC): ICD-10-CM

## 2020-08-26 PROCEDURE — 99214 OFFICE O/P EST MOD 30 MIN: CPT | Mod: 95,CR | Performed by: NURSE PRACTITIONER

## 2020-08-26 ASSESSMENT — PATIENT HEALTH QUESTIONNAIRE - PHQ9: CLINICAL INTERPRETATION OF PHQ2 SCORE: 0

## 2020-08-26 NOTE — ASSESSMENT & PLAN NOTE
"Patient reports this to be chronic and stable  However unable to evaluate blood pressure secondary to this being a virtual visit, patient reports to me that she is \"absolutely terrified\" to come in in the setting of current pandemic especially in light of her chronic respiratory illness  I did strongly advise that she does need to have fasting labs done as these have not been done in well over a year, she was told that it could be arranged for her to be the very first patient of the day when the lab opens at 6 AM  States she will have them done at some point when she is \"ready\" to come in  She does understand that she may not receive ongoing refills if she waits too long to have her labs done as kidney and liver function needs to be monitored while on medications  "

## 2020-08-26 NOTE — NON-PROVIDER
O2 TESTING     ROOM AIR AT REST: 81 % without oxygen      SATS ON O2 @ rest : 93% ON 2 L  SATS ON O2 WHILE AMBULATION: 92% ON 3 L

## 2020-08-26 NOTE — ASSESSMENT & PLAN NOTE
Patient presents via virtual visit for O2 recertification  She has chronic respiratory failure and is on continuous oxygen  Patient did present today for walk test, she was 81% without her oxygen thus the complete walk test was not necessary  Order will be filled out for DME of choice and sent  Patient advised to continue care with pulmonologist

## 2020-08-26 NOTE — PROGRESS NOTES
"Virtual Visit: Established Patient   This visit was conducted via Zoom  using secure and encrypted videoconferencing technology. The patient was in a private location in the state of [place].    The patient's identity was confirmed and verbal consent was obtained for this virtual visit.    Subjective:   CC:   Chief Complaint   Patient presents with   • Oxygen Dependency     recert for DME / patient completed 02 in clinic this AM        Mallorie Xie is a 65 y.o. female presenting for evaluation and management of:    Chronic respiratory failure with hypoxia (HCC)  Patient presents via virtual visit for O2 recertification  She has chronic respiratory failure and is on continuous oxygen  Patient did present today in clinic for walk test, O2 saturations were at 81% without her oxygen thus the complete walk test was not necessary  Order will be filled out for DME of choice and sent  Patient advised to continue care with pulmonologist    Acquired hypothyroidism  Patient does have history of hypothyroidism  Past due for labs, she is on levothyroxine 50 mcg daily  She does deny symptoms of hypothyroidism and does understand to take this medication on an empty stomach first thing in the morning apart from other medications    Essential hypertension  Patient reports this to be chronic and stable  However unable to evaluate blood pressure secondary to this being a virtual visit, patient reports to me that she is \"absolutely terrified\" to come in in the setting of current pandemic especially in light of her chronic respiratory illness  I did strongly advise that she does need to have fasting labs done as these have not been done in well over a year, she was told that it could be arranged for her to be the very first patient of the day when the lab opens at 6 AM  States she will have them done at some point when she is \"ready\" to come in  She does understand that she may not receive ongoing refills if she waits too long to " have her labs done as kidney and liver function needs to be monitored while on medications        ROS   Respiratory ROS: positive per HPI  CV ROS: Positive for hypertension  Endocrine ROS: Positive for hypothyroidism    Allergies   Allergen Reactions   • Bee Venom Anaphylaxis       Current medicines (including changes today)  Current Outpatient Medications   Medication Sig Dispense Refill   • levothyroxine (SYNTHROID) 50 MCG Tab TAKE 1 TABLET EVERY MORNINGON AN EMPTY STOMACH.       MUST HAVE LABS DONE        BEFORE FURTHER REFILLS 90 Tab 0   • lisinopril-hydrochlorothiazide (PRINZIDE) 10-12.5 MG per tablet TAKE 1 TABLET DAILY 90 Tab 1   • atorvastatin (LIPITOR) 40 MG Tab TAKE 1 TABLET DAILY 90 Tab 1   • albuterol (VENTOLIN HFA) 108 (90 Base) MCG/ACT Aero Soln inhalation aerosol Inhale 2 Puffs by mouth every 6 hours as needed for Shortness of Breath. 8.5 g 3   • Spacer/Aero-Holding Chambers (COMPACT SPACE CHAMBER) Device Inhale 1 Units by mouth 1 time daily as needed. 1 Device 1   • aspirin 81 MG tablet Take 81 mg by mouth every day.     • loratadine (CLARITIN) 10 MG Tab Take 10 mg by mouth every day.     • Multiple Vitamins-Minerals (MULTIVITAMIN ADULT PO) Take  by mouth.     • NON SPECIFIED Please administer Zostivax 1 Each 0     No current facility-administered medications for this visit.        Patient Active Problem List    Diagnosis Date Noted   • Chronic respiratory failure with hypoxia ([facility]) 05/08/2018   • Intraductal carcinoma in situ of right breast 10/24/2017   • LUIS (obstructive sleep apnea) 08/11/2017   • Acquired hypothyroidism 06/19/2017   • Thyroid nodule 04/07/2017   • Pulmonary emphysema (HCC) 04/07/2017   • Vision changes 04/07/2017   • Abnormal TSH 04/07/2017   • Essential hypertension 03/31/2017   • Morbid obesity with BMI of 45.0-49.9, adult ([facility]) 03/31/2017   • Former cigarette smoker 03/31/2017   • Dyspnea on exertion 03/31/2017   • Left shoulder pain 08/27/2014   • Upper back pain on left  side 08/27/2014   • Radiculopathy of arm 12/04/2012   • Upper extremity pain, diffuse 12/04/2012   • Tobacco abuse        Family History   Problem Relation Age of Onset   • No Known Problems Daughter    • Cancer Sister         cervical   • Cancer Sister         renal   • Cancer Maternal Uncle         lung   • Cancer Maternal Uncle         melanoma   • Sleep Apnea Neg Hx        She  has a past medical history of Asthma, Bowel habit changes, Breast cancer (HCC), Breath shortness, Cancer (HCC), Chickenpox, Colon polyps, Congestive heart failure (HCC), Dental disorder, Emphysema of lung (HCC), Essential hypertension (3/31/2017), Heart burn, Indigestion, Mumps, Obesity, Sleep apnea, and Tobacco abuse. She also has no past medical history of Hyperlipidemia.  She  has a past surgical history that includes colonoscopy and mastectomy (Right, 10/24/2017).       Objective:   There were no vitals taken for this visit.    Physical Exam:  Constitutional: Alert, no distress, well-groomed.  Skin: No rashes in visible areas.  Eye: Round. Conjunctiva clear, lids normal. No icterus.   ENMT: Lips pink without lesions, good dentition, moist mucous membranes. Phonation normal.  Neck: No masses, no thyromegaly. Moves freely without pain.  Respiratory: Unlabored respiratory effort, no cough or audible wheeze  Psych: Alert and oriented x3, normal affect and mood.       Assessment and Plan:   The following treatment plan was discussed:     1. Chronic respiratory failure with hypoxia (HCC)    2. Abnormal TSH    3. Acquired hypothyroidism  - TSH WITH REFLEX TO FT4; Future    4. Essential hypertension  - Comp Metabolic Panel; Future  - CBC WITH DIFFERENTIAL; Future  - Lipid Profile; Future        Follow-up: Return if symptoms worsen or fail to improve, for Follow-up, Discuss Labs.

## 2020-08-26 NOTE — ASSESSMENT & PLAN NOTE
Patient does have history of hypothyroidism  Past due for labs, she is on levothyroxine 50 mcg daily  She does deny symptoms of hypothyroidism and does understand to take this medication on an empty stomach first thing in the morning apart from other medications

## 2020-09-07 ENCOUNTER — PATIENT MESSAGE (OUTPATIENT)
Dept: MEDICAL GROUP | Facility: PHYSICIAN GROUP | Age: 65
End: 2020-09-07

## 2020-09-08 NOTE — TELEPHONE ENCOUNTER
"From: Mallorie Xie  To: KRISTAL Edmonds  Sent: 9/7/2020 4:54 AM PDT  Subject: Non-Urgent Medical Question    Artis/Kori Sheikh said you would be able to schedule a \"first appointment of the day\" with no  touching and minimal interaction with others to get my lab tests she has ordered done. I will be in town 10/5 - 10/9/2020 and am hoping you can get me an appointment for that week. I am so concerned about covid19 and now even more with flu season upon us. I very much appreciate your compassion and understanding.    Thank You,  Kori Xie  "

## 2020-09-15 ENCOUNTER — PATIENT MESSAGE (OUTPATIENT)
Dept: MEDICAL GROUP | Facility: PHYSICIAN GROUP | Age: 65
End: 2020-09-15

## 2020-09-15 NOTE — TELEPHONE ENCOUNTER
From: Mallorie Xie  To: KRISTAL Edmonds  Sent: 9/15/2020 12:05 PM PDT  Subject: Prescription Question    [employer] showed up at my home to take my oxygen equipment saying they did not get the paperwork recertifying me and prescription for oxygen. Will you please communicate with Shaina @ [employer] [phone] and straighten this out? I came into the [facility] office and completed the test. Do I need to do anything else for my oxygen recert?    Thank You,  Kori Xie

## 2020-09-28 DIAGNOSIS — Z11.59 NEED FOR HEPATITIS C SCREENING TEST: ICD-10-CM

## 2020-10-07 ENCOUNTER — HOSPITAL ENCOUNTER (OUTPATIENT)
Dept: LAB | Facility: MEDICAL CENTER | Age: 65
End: 2020-10-07
Attending: NURSE PRACTITIONER
Payer: MEDICARE

## 2020-10-07 DIAGNOSIS — Z11.59 NEED FOR HEPATITIS C SCREENING TEST: ICD-10-CM

## 2020-10-07 DIAGNOSIS — E03.9 ACQUIRED HYPOTHYROIDISM: ICD-10-CM

## 2020-10-07 DIAGNOSIS — I10 ESSENTIAL HYPERTENSION: ICD-10-CM

## 2020-10-07 LAB
BASOPHILS # BLD AUTO: 0.5 % (ref 0–1.8)
BASOPHILS # BLD: 0.04 K/UL (ref 0–0.12)
EOSINOPHIL # BLD AUTO: 0.19 K/UL (ref 0–0.51)
EOSINOPHIL NFR BLD: 2.3 % (ref 0–6.9)
ERYTHROCYTE [DISTWIDTH] IN BLOOD BY AUTOMATED COUNT: 48.9 FL (ref 35.9–50)
HCT VFR BLD AUTO: 40.3 % (ref 37–47)
HGB BLD-MCNC: 12.5 G/DL (ref 12–16)
IMM GRANULOCYTES # BLD AUTO: 0.02 K/UL (ref 0–0.11)
IMM GRANULOCYTES NFR BLD AUTO: 0.2 % (ref 0–0.9)
LYMPHOCYTES # BLD AUTO: 1.36 K/UL (ref 1–4.8)
LYMPHOCYTES NFR BLD: 16.2 % (ref 22–41)
MCH RBC QN AUTO: 29.6 PG (ref 27–33)
MCHC RBC AUTO-ENTMCNC: 31 G/DL (ref 33.6–35)
MCV RBC AUTO: 95.3 FL (ref 81.4–97.8)
MONOCYTES # BLD AUTO: 0.67 K/UL (ref 0–0.85)
MONOCYTES NFR BLD AUTO: 8 % (ref 0–13.4)
NEUTROPHILS # BLD AUTO: 6.1 K/UL (ref 2–7.15)
NEUTROPHILS NFR BLD: 72.8 % (ref 44–72)
NRBC # BLD AUTO: 0 K/UL
NRBC BLD-RTO: 0 /100 WBC
PLATELET # BLD AUTO: 199 K/UL (ref 164–446)
PMV BLD AUTO: 12.6 FL (ref 9–12.9)
RBC # BLD AUTO: 4.23 M/UL (ref 4.2–5.4)
WBC # BLD AUTO: 8.4 K/UL (ref 4.8–10.8)

## 2020-10-07 PROCEDURE — 84439 ASSAY OF FREE THYROXINE: CPT

## 2020-10-07 PROCEDURE — 84443 ASSAY THYROID STIM HORMONE: CPT

## 2020-10-07 PROCEDURE — 80061 LIPID PANEL: CPT

## 2020-10-07 PROCEDURE — 80053 COMPREHEN METABOLIC PANEL: CPT

## 2020-10-07 PROCEDURE — 36415 COLL VENOUS BLD VENIPUNCTURE: CPT | Mod: GA

## 2020-10-07 PROCEDURE — 85025 COMPLETE CBC W/AUTO DIFF WBC: CPT

## 2020-10-07 PROCEDURE — G0472 HEP C SCREEN HIGH RISK/OTHER: HCPCS | Mod: GA

## 2020-10-08 LAB
ALBUMIN SERPL BCP-MCNC: 4.3 G/DL (ref 3.2–4.9)
ALBUMIN/GLOB SERPL: 1.7 G/DL
ALP SERPL-CCNC: 98 U/L (ref 30–99)
ALT SERPL-CCNC: 46 U/L (ref 2–50)
ANION GAP SERPL CALC-SCNC: 11 MMOL/L (ref 7–16)
AST SERPL-CCNC: 26 U/L (ref 12–45)
BILIRUB SERPL-MCNC: 0.3 MG/DL (ref 0.1–1.5)
BUN SERPL-MCNC: 21 MG/DL (ref 8–22)
CALCIUM SERPL-MCNC: 10.2 MG/DL (ref 8.5–10.5)
CHLORIDE SERPL-SCNC: 101 MMOL/L (ref 96–112)
CHOLEST SERPL-MCNC: 150 MG/DL (ref 100–199)
CO2 SERPL-SCNC: 26 MMOL/L (ref 20–33)
CREAT SERPL-MCNC: 0.58 MG/DL (ref 0.5–1.4)
FASTING STATUS PATIENT QL REPORTED: NORMAL
GLOBULIN SER CALC-MCNC: 2.6 G/DL (ref 1.9–3.5)
GLUCOSE SERPL-MCNC: 116 MG/DL (ref 65–99)
HCV AB SER QL: NORMAL
HDLC SERPL-MCNC: 53 MG/DL
LDLC SERPL CALC-MCNC: 69 MG/DL
POTASSIUM SERPL-SCNC: 4.1 MMOL/L (ref 3.6–5.5)
PROT SERPL-MCNC: 6.9 G/DL (ref 6–8.2)
SODIUM SERPL-SCNC: 138 MMOL/L (ref 135–145)
T4 FREE SERPL-MCNC: 1.07 NG/DL (ref 0.93–1.7)
TRIGL SERPL-MCNC: 140 MG/DL (ref 0–149)
TSH SERPL DL<=0.005 MIU/L-ACNC: 6.17 UIU/ML (ref 0.38–5.33)

## 2020-10-13 DIAGNOSIS — R73.01 ELEVATED FASTING GLUCOSE: ICD-10-CM

## 2020-10-13 DIAGNOSIS — E03.9 ACQUIRED HYPOTHYROIDISM: ICD-10-CM

## 2020-12-17 ENCOUNTER — NURSE TRIAGE (OUTPATIENT)
Dept: HEALTH INFORMATION MANAGEMENT | Facility: OTHER | Age: 65
End: 2020-12-17
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
## 2021-01-04 DIAGNOSIS — E78.2 MIXED HYPERLIPIDEMIA: ICD-10-CM

## 2021-01-04 DIAGNOSIS — R60.9 EDEMA, UNSPECIFIED TYPE: ICD-10-CM

## 2021-01-06 RX ORDER — LISINOPRIL AND HYDROCHLOROTHIAZIDE 12.5; 1 MG/1; MG/1
TABLET ORAL
Qty: 90 TAB | Refills: 1 | Status: SHIPPED | OUTPATIENT
Start: 2021-01-06 | End: 2021-06-18 | Stop reason: SDUPTHER

## 2021-01-06 RX ORDER — ATORVASTATIN CALCIUM 40 MG/1
TABLET, FILM COATED ORAL
Qty: 90 TAB | Refills: 1 | Status: SHIPPED | OUTPATIENT
Start: 2021-01-06 | End: 2021-06-18 | Stop reason: SDUPTHER

## 2021-06-16 DIAGNOSIS — E78.2 MIXED HYPERLIPIDEMIA: ICD-10-CM

## 2021-06-16 DIAGNOSIS — R60.9 EDEMA, UNSPECIFIED TYPE: ICD-10-CM

## 2021-06-18 ENCOUNTER — PATIENT MESSAGE (OUTPATIENT)
Dept: MEDICAL GROUP | Facility: PHYSICIAN GROUP | Age: 66
End: 2021-06-18

## 2021-06-18 DIAGNOSIS — R79.89 ABNORMAL TSH: ICD-10-CM

## 2021-06-18 DIAGNOSIS — E78.2 MIXED HYPERLIPIDEMIA: ICD-10-CM

## 2021-06-18 DIAGNOSIS — R60.9 EDEMA, UNSPECIFIED TYPE: ICD-10-CM

## 2021-06-18 RX ORDER — ATORVASTATIN CALCIUM 40 MG/1
40 TABLET, FILM COATED ORAL
Qty: 90 TABLET | Refills: 1 | Status: CANCELLED | OUTPATIENT
Start: 2021-06-18

## 2021-06-18 RX ORDER — LEVOTHYROXINE SODIUM 0.05 MG/1
50 TABLET ORAL
Qty: 90 TABLET | Refills: 0 | Status: SHIPPED | OUTPATIENT
Start: 2021-06-18 | End: 2021-12-27 | Stop reason: SDUPTHER

## 2021-06-18 RX ORDER — LISINOPRIL AND HYDROCHLOROTHIAZIDE 12.5; 1 MG/1; MG/1
TABLET ORAL
Qty: 90 TABLET | Refills: 0 | Status: SHIPPED | OUTPATIENT
Start: 2021-06-18 | End: 2021-09-20 | Stop reason: SDUPTHER

## 2021-06-18 RX ORDER — ATORVASTATIN CALCIUM 40 MG/1
TABLET, FILM COATED ORAL
Qty: 90 TABLET | Refills: 1 | Status: SHIPPED | OUTPATIENT
Start: 2021-06-18 | End: 2022-06-14

## 2021-06-18 RX ORDER — LEVOTHYROXINE SODIUM 0.05 MG/1
50 TABLET ORAL
Qty: 90 TABLET | Refills: 3 | Status: CANCELLED | OUTPATIENT
Start: 2021-06-18

## 2021-06-18 RX ORDER — LISINOPRIL AND HYDROCHLOROTHIAZIDE 12.5; 1 MG/1; MG/1
1 TABLET ORAL
Qty: 90 TABLET | Refills: 1 | Status: CANCELLED | OUTPATIENT
Start: 2021-06-18

## 2021-06-18 NOTE — TELEPHONE ENCOUNTER
From: Mallorie Xie  To: Nurse Practitioner Kori Moran  Sent: 6/18/2021 2:34 PM PDT  Subject: Prescription Question    [facility] says they are unable to reach you in order to refill my Lisinopril and my Atorvastatin and requests you contact them to complete my order. Thank You!    Kori Xie

## 2021-06-21 RX ORDER — LISINOPRIL AND HYDROCHLOROTHIAZIDE 12.5; 1 MG/1; MG/1
TABLET ORAL
Qty: 90 TABLET | Refills: 1 | Status: SHIPPED | OUTPATIENT
Start: 2021-06-21 | End: 2021-12-27 | Stop reason: SDUPTHER

## 2021-06-21 RX ORDER — ATORVASTATIN CALCIUM 40 MG/1
TABLET, FILM COATED ORAL
Qty: 90 TABLET | Refills: 1 | Status: SHIPPED | OUTPATIENT
Start: 2021-06-21 | End: 2021-12-27 | Stop reason: SDUPTHER

## 2021-06-21 NOTE — TELEPHONE ENCOUNTER
Pt has had OV within the 12 month protocol and lipid panel is current. 6 month supply sent to pharmacy.   Lab Results   Component Value Date/Time    CHOLSTRLTOT 150 10/07/2020 06:10 AM    LDL 69 10/07/2020 06:10 AM    HDL 53 10/07/2020 06:10 AM    TRIGLYCERIDE 140 10/07/2020 06:10 AM       Lab Results   Component Value Date/Time    SODIUM 138 10/07/2020 06:10 AM    POTASSIUM 4.1 10/07/2020 06:10 AM    CHLORIDE 101 10/07/2020 06:10 AM    CO2 26 10/07/2020 06:10 AM    GLUCOSE 116 (H) 10/07/2020 06:10 AM    BUN 21 10/07/2020 06:10 AM    CREATININE 0.58 10/07/2020 06:10 AM     Lab Results   Component Value Date/Time    ALKPHOSPHAT 98 10/07/2020 06:10 AM    ASTSGOT 26 10/07/2020 06:10 AM    ALTSGPT 46 10/07/2020 06:10 AM    TBILIRUBIN 0.3 10/07/2020 06:10 AM

## 2021-12-26 ENCOUNTER — PATIENT MESSAGE (OUTPATIENT)
Dept: MEDICAL GROUP | Facility: PHYSICIAN GROUP | Age: 66
End: 2021-12-26

## 2021-12-26 DIAGNOSIS — R60.9 EDEMA, UNSPECIFIED TYPE: ICD-10-CM

## 2021-12-26 DIAGNOSIS — R79.89 ABNORMAL TSH: ICD-10-CM

## 2021-12-26 DIAGNOSIS — E78.2 MIXED HYPERLIPIDEMIA: ICD-10-CM

## 2021-12-27 RX ORDER — LEVOTHYROXINE SODIUM 0.05 MG/1
50 TABLET ORAL
Qty: 90 TABLET | Refills: 0 | Status: SHIPPED | OUTPATIENT
Start: 2021-12-27 | End: 2022-03-15

## 2021-12-27 RX ORDER — ATORVASTATIN CALCIUM 40 MG/1
40 TABLET, FILM COATED ORAL DAILY
Qty: 90 TABLET | Refills: 0 | Status: SHIPPED | OUTPATIENT
Start: 2021-12-27 | End: 2022-03-15

## 2021-12-27 RX ORDER — LISINOPRIL AND HYDROCHLOROTHIAZIDE 12.5; 1 MG/1; MG/1
1 TABLET ORAL DAILY
Qty: 90 TABLET | Refills: 0 | Status: SHIPPED | OUTPATIENT
Start: 2021-12-27 | End: 2022-04-26 | Stop reason: SDUPTHER

## 2021-12-27 NOTE — PATIENT COMMUNICATION
Received request via: Patient    Was the patient seen in the last year in this department? No    Last OV 8/26/20    Does the patient have an active prescription (recently filled or refills available) for medication(s) requested? No    Requested Prescriptions     Pending Prescriptions Disp Refills   • lisinopril-hydrochlorothiazide (PRINZIDE) 10-12.5 MG per tablet 90 Tablet 0     Sig: Take 1 Tablet by mouth every day.   • atorvastatin (LIPITOR) 40 MG Tab 90 Tablet 0     Sig: Take 1 Tablet by mouth every day.   • levothyroxine (SYNTHROID) 50 MCG Tab 90 Tablet 0     Sig: Take 1 Tablet by mouth every morning on an empty stomach.

## 2022-03-14 DIAGNOSIS — R79.89 ABNORMAL TSH: ICD-10-CM

## 2022-03-14 DIAGNOSIS — E78.2 MIXED HYPERLIPIDEMIA: ICD-10-CM

## 2022-03-15 RX ORDER — ATORVASTATIN CALCIUM 40 MG/1
40 TABLET, FILM COATED ORAL DAILY
Qty: 90 TABLET | Refills: 0 | Status: SHIPPED | OUTPATIENT
Start: 2022-03-15 | End: 2022-06-14

## 2022-03-15 RX ORDER — LEVOTHYROXINE SODIUM 0.05 MG/1
50 TABLET ORAL
Qty: 90 TABLET | Refills: 0 | Status: SHIPPED | OUTPATIENT
Start: 2022-03-15 | End: 2022-06-14

## 2022-03-15 NOTE — TELEPHONE ENCOUNTER
Received request via: Pharmacy    Was the patient seen in the last year in this department? Yes    Does the patient have an active prescription (recently filled or refills available) for medication(s) requested? No   Requested Prescriptions     Pending Prescriptions Disp Refills    atorvastatin (LIPITOR) 40 MG Tab [Pharmacy Med Name: ATORVASTATIN TAB 40MG] 90 Tablet 0     Sig: TAKE 1 TABLET DAILY    levothyroxine (SYNTHROID) 50 MCG Tab [Pharmacy Med Name: LEVOTHYROXIN TAB 50MCG] 90 Tablet 0     Sig: TAKE 1 TABLET EVERY MORNINGON AN EMPTY STOMACH

## 2022-04-26 ENCOUNTER — TELEPHONE (OUTPATIENT)
Dept: MEDICAL GROUP | Facility: PHYSICIAN GROUP | Age: 67
End: 2022-04-26
Payer: MEDICARE

## 2022-04-26 DIAGNOSIS — R60.9 EDEMA, UNSPECIFIED TYPE: ICD-10-CM

## 2022-04-26 RX ORDER — LISINOPRIL AND HYDROCHLOROTHIAZIDE 12.5; 1 MG/1; MG/1
1 TABLET ORAL DAILY
Qty: 90 TABLET | Refills: 0 | Status: SHIPPED | OUTPATIENT
Start: 2022-04-26 | End: 2022-07-20 | Stop reason: SDUPTHER

## 2022-04-26 NOTE — TELEPHONE ENCOUNTER
Received request via: Patient    Was the patient seen in the last year in this department? No    Does the patient have an active prescription (recently filled or refills available) for medication(s) requested? No     Last ov 8/26/2020  Next appt 8/30/22 with Mary Lopez to [facility]

## 2022-04-26 NOTE — TELEPHONE ENCOUNTER
Received fax from [employer] for re-cert for oxygen. Patient last seen in august 2020. Left voicemail for patient to call to est care with a new pcp in order for certificate to be signed

## 2022-07-20 PROBLEM — R73.01 IMPAIRED FASTING BLOOD SUGAR: Status: ACTIVE | Noted: 2022-07-20

## 2022-08-01 ENCOUNTER — HOSPITAL ENCOUNTER (OUTPATIENT)
Dept: LAB | Facility: MEDICAL CENTER | Age: 67
End: 2022-08-01
Attending: NURSE PRACTITIONER
Payer: MEDICARE

## 2022-08-01 DIAGNOSIS — R73.01 IMPAIRED FASTING BLOOD SUGAR: ICD-10-CM

## 2022-08-01 DIAGNOSIS — E03.9 ACQUIRED HYPOTHYROIDISM: ICD-10-CM

## 2022-08-01 DIAGNOSIS — I10 ESSENTIAL HYPERTENSION: ICD-10-CM

## 2022-08-01 DIAGNOSIS — E66.01 MORBID OBESITY WITH BMI OF 45.0-49.9, ADULT (HCC): ICD-10-CM

## 2022-08-01 DIAGNOSIS — E55.9 VITAMIN D DEFICIENCY: ICD-10-CM

## 2022-08-01 LAB
ALBUMIN SERPL BCP-MCNC: 4.4 G/DL (ref 3.2–4.9)
ALBUMIN/GLOB SERPL: 1.6 G/DL
ALP SERPL-CCNC: 94 U/L (ref 30–99)
ALT SERPL-CCNC: 43 U/L (ref 2–50)
ANION GAP SERPL CALC-SCNC: 10 MMOL/L (ref 7–16)
AST SERPL-CCNC: 35 U/L (ref 12–45)
BASOPHILS # BLD AUTO: 0.3 % (ref 0–1.8)
BASOPHILS # BLD: 0.02 K/UL (ref 0–0.12)
BILIRUB SERPL-MCNC: 0.4 MG/DL (ref 0.1–1.5)
BUN SERPL-MCNC: 15 MG/DL (ref 8–22)
CALCIUM SERPL-MCNC: 10.6 MG/DL (ref 8.5–10.5)
CHLORIDE SERPL-SCNC: 98 MMOL/L (ref 96–112)
CHOLEST SERPL-MCNC: 166 MG/DL (ref 100–199)
CO2 SERPL-SCNC: 33 MMOL/L (ref 20–33)
CREAT SERPL-MCNC: 0.57 MG/DL (ref 0.5–1.4)
EOSINOPHIL # BLD AUTO: 0.13 K/UL (ref 0–0.51)
EOSINOPHIL NFR BLD: 2 % (ref 0–6.9)
ERYTHROCYTE [DISTWIDTH] IN BLOOD BY AUTOMATED COUNT: 44.5 FL (ref 35.9–50)
EST. AVERAGE GLUCOSE BLD GHB EST-MCNC: 134 MG/DL
FASTING STATUS PATIENT QL REPORTED: NORMAL
GFR SERPLBLD CREATININE-BSD FMLA CKD-EPI: 98 ML/MIN/1.73 M 2
GLOBULIN SER CALC-MCNC: 2.7 G/DL (ref 1.9–3.5)
GLUCOSE SERPL-MCNC: 118 MG/DL (ref 65–99)
HBA1C MFR BLD: 6.3 % (ref 4–5.6)
HCT VFR BLD AUTO: 35.6 % (ref 37–47)
HDLC SERPL-MCNC: 49 MG/DL
HGB BLD-MCNC: 11 G/DL (ref 12–16)
IMM GRANULOCYTES # BLD AUTO: 0.03 K/UL (ref 0–0.11)
IMM GRANULOCYTES NFR BLD AUTO: 0.5 % (ref 0–0.9)
LDLC SERPL CALC-MCNC: 85 MG/DL
LYMPHOCYTES # BLD AUTO: 1.37 K/UL (ref 1–4.8)
LYMPHOCYTES NFR BLD: 21 % (ref 22–41)
MCH RBC QN AUTO: 29.2 PG (ref 27–33)
MCHC RBC AUTO-ENTMCNC: 30.9 G/DL (ref 33.6–35)
MCV RBC AUTO: 94.4 FL (ref 81.4–97.8)
MONOCYTES # BLD AUTO: 0.5 K/UL (ref 0–0.85)
MONOCYTES NFR BLD AUTO: 7.7 % (ref 0–13.4)
NEUTROPHILS # BLD AUTO: 4.47 K/UL (ref 2–7.15)
NEUTROPHILS NFR BLD: 68.5 % (ref 44–72)
NRBC # BLD AUTO: 0 K/UL
NRBC BLD-RTO: 0 /100 WBC
PLATELET # BLD AUTO: 225 K/UL (ref 164–446)
PMV BLD AUTO: 11.8 FL (ref 9–12.9)
POTASSIUM SERPL-SCNC: 3.9 MMOL/L (ref 3.6–5.5)
PROT SERPL-MCNC: 7.1 G/DL (ref 6–8.2)
RBC # BLD AUTO: 3.77 M/UL (ref 4.2–5.4)
SODIUM SERPL-SCNC: 141 MMOL/L (ref 135–145)
TRIGL SERPL-MCNC: 158 MG/DL (ref 0–149)
WBC # BLD AUTO: 6.5 K/UL (ref 4.8–10.8)

## 2022-08-01 PROCEDURE — 80061 LIPID PANEL: CPT

## 2022-08-01 PROCEDURE — 80053 COMPREHEN METABOLIC PANEL: CPT

## 2022-08-01 PROCEDURE — 83036 HEMOGLOBIN GLYCOSYLATED A1C: CPT | Mod: GA

## 2022-08-01 PROCEDURE — 84443 ASSAY THYROID STIM HORMONE: CPT

## 2022-08-01 PROCEDURE — 85025 COMPLETE CBC W/AUTO DIFF WBC: CPT

## 2022-08-01 PROCEDURE — 36415 COLL VENOUS BLD VENIPUNCTURE: CPT

## 2022-08-01 PROCEDURE — 82306 VITAMIN D 25 HYDROXY: CPT

## 2022-08-02 LAB
25(OH)D3 SERPL-MCNC: 71 NG/ML (ref 30–100)
TSH SERPL DL<=0.005 MIU/L-ACNC: 4.39 UIU/ML (ref 0.38–5.33)

## 2022-08-06 SDOH — ECONOMIC STABILITY: INCOME INSECURITY: IN THE LAST 12 MONTHS, WAS THERE A TIME WHEN YOU WERE NOT ABLE TO PAY THE MORTGAGE OR RENT ON TIME?: NO

## 2022-08-06 SDOH — ECONOMIC STABILITY: HOUSING INSECURITY
IN THE LAST 12 MONTHS, WAS THERE A TIME WHEN YOU DID NOT HAVE A STEADY PLACE TO SLEEP OR SLEPT IN A SHELTER (INCLUDING NOW)?: NO

## 2022-08-06 SDOH — HEALTH STABILITY: MENTAL HEALTH
STRESS IS WHEN SOMEONE FEELS TENSE, NERVOUS, ANXIOUS, OR CAN'T SLEEP AT NIGHT BECAUSE THEIR MIND IS TROUBLED. HOW STRESSED ARE YOU?: NOT AT ALL

## 2022-08-06 SDOH — ECONOMIC STABILITY: TRANSPORTATION INSECURITY
IN THE PAST 12 MONTHS, HAS THE LACK OF TRANSPORTATION KEPT YOU FROM MEDICAL APPOINTMENTS OR FROM GETTING MEDICATIONS?: NO

## 2022-08-06 SDOH — ECONOMIC STABILITY: HOUSING INSECURITY: IN THE LAST 12 MONTHS, HOW MANY PLACES HAVE YOU LIVED?: 1

## 2022-08-06 SDOH — HEALTH STABILITY: PHYSICAL HEALTH: ON AVERAGE, HOW MANY MINUTES DO YOU ENGAGE IN EXERCISE AT THIS LEVEL?: 0 MIN

## 2022-08-06 SDOH — ECONOMIC STABILITY: FOOD INSECURITY: WITHIN THE PAST 12 MONTHS, THE FOOD YOU BOUGHT JUST DIDN'T LAST AND YOU DIDN'T HAVE MONEY TO GET MORE.: NEVER TRUE

## 2022-08-06 SDOH — ECONOMIC STABILITY: FOOD INSECURITY: WITHIN THE PAST 12 MONTHS, YOU WORRIED THAT YOUR FOOD WOULD RUN OUT BEFORE YOU GOT MONEY TO BUY MORE.: NEVER TRUE

## 2022-08-06 SDOH — ECONOMIC STABILITY: TRANSPORTATION INSECURITY
IN THE PAST 12 MONTHS, HAS LACK OF TRANSPORTATION KEPT YOU FROM MEETINGS, WORK, OR FROM GETTING THINGS NEEDED FOR DAILY LIVING?: NO

## 2022-08-06 SDOH — HEALTH STABILITY: PHYSICAL HEALTH: ON AVERAGE, HOW MANY DAYS PER WEEK DO YOU ENGAGE IN MODERATE TO STRENUOUS EXERCISE (LIKE A BRISK WALK)?: 0 DAYS

## 2022-08-06 SDOH — ECONOMIC STABILITY: INCOME INSECURITY: HOW HARD IS IT FOR YOU TO PAY FOR THE VERY BASICS LIKE FOOD, HOUSING, MEDICAL CARE, AND HEATING?: NOT HARD AT ALL

## 2022-08-06 SDOH — ECONOMIC STABILITY: TRANSPORTATION INSECURITY
IN THE PAST 12 MONTHS, HAS LACK OF RELIABLE TRANSPORTATION KEPT YOU FROM MEDICAL APPOINTMENTS, MEETINGS, WORK OR FROM GETTING THINGS NEEDED FOR DAILY LIVING?: NO

## 2022-08-06 ASSESSMENT — SOCIAL DETERMINANTS OF HEALTH (SDOH)
HOW OFTEN DO YOU GET TOGETHER WITH FRIENDS OR RELATIVES?: NEVER
HOW OFTEN DO YOU ATTENT MEETINGS OF THE CLUB OR ORGANIZATION YOU BELONG TO?: NEVER
IN A TYPICAL WEEK, HOW MANY TIMES DO YOU TALK ON THE PHONE WITH FAMILY, FRIENDS, OR NEIGHBORS?: NEVER
DO YOU BELONG TO ANY CLUBS OR ORGANIZATIONS SUCH AS CHURCH GROUPS UNIONS, FRATERNAL OR ATHLETIC GROUPS, OR SCHOOL GROUPS?: NO
WITHIN THE PAST 12 MONTHS, YOU WORRIED THAT YOUR FOOD WOULD RUN OUT BEFORE YOU GOT THE MONEY TO BUY MORE: NEVER TRUE
IN A TYPICAL WEEK, HOW MANY TIMES DO YOU TALK ON THE PHONE WITH FAMILY, FRIENDS, OR NEIGHBORS?: NEVER
HOW OFTEN DO YOU ATTEND CHURCH OR RELIGIOUS SERVICES?: NEVER
HOW HARD IS IT FOR YOU TO PAY FOR THE VERY BASICS LIKE FOOD, HOUSING, MEDICAL CARE, AND HEATING?: NOT HARD AT ALL
DO YOU BELONG TO ANY CLUBS OR ORGANIZATIONS SUCH AS CHURCH GROUPS UNIONS, FRATERNAL OR ATHLETIC GROUPS, OR SCHOOL GROUPS?: NO
HOW OFTEN DO YOU ATTENT MEETINGS OF THE CLUB OR ORGANIZATION YOU BELONG TO?: NEVER
HOW OFTEN DO YOU GET TOGETHER WITH FRIENDS OR RELATIVES?: NEVER
HOW OFTEN DO YOU HAVE SIX OR MORE DRINKS ON ONE OCCASION: NEVER
HOW MANY DRINKS CONTAINING ALCOHOL DO YOU HAVE ON A TYPICAL DAY WHEN YOU ARE DRINKING: PATIENT DOES NOT DRINK
HOW OFTEN DO YOU ATTEND CHURCH OR RELIGIOUS SERVICES?: NEVER
HOW OFTEN DO YOU HAVE A DRINK CONTAINING ALCOHOL: NEVER

## 2022-08-06 ASSESSMENT — LIFESTYLE VARIABLES
HOW OFTEN DO YOU HAVE A DRINK CONTAINING ALCOHOL: NEVER
HOW OFTEN DO YOU HAVE SIX OR MORE DRINKS ON ONE OCCASION: NEVER
SKIP TO QUESTIONS 9-10: 1
HOW MANY STANDARD DRINKS CONTAINING ALCOHOL DO YOU HAVE ON A TYPICAL DAY: PATIENT DOES NOT DRINK
AUDIT-C TOTAL SCORE: 0

## 2022-08-09 ENCOUNTER — OFFICE VISIT (OUTPATIENT)
Dept: MEDICAL GROUP | Facility: PHYSICIAN GROUP | Age: 67
End: 2022-08-09
Payer: MEDICARE

## 2022-08-09 VITALS
TEMPERATURE: 96.6 F | BODY MASS INDEX: 49.54 KG/M2 | HEART RATE: 86 BPM | HEIGHT: 64 IN | SYSTOLIC BLOOD PRESSURE: 118 MMHG | RESPIRATION RATE: 18 BRPM | WEIGHT: 290.2 LBS | DIASTOLIC BLOOD PRESSURE: 70 MMHG | OXYGEN SATURATION: 98 %

## 2022-08-09 DIAGNOSIS — E66.01 MORBID OBESITY WITH BMI OF 45.0-49.9, ADULT (HCC): ICD-10-CM

## 2022-08-09 DIAGNOSIS — R79.89 ABNORMAL TSH: ICD-10-CM

## 2022-08-09 DIAGNOSIS — J44.9 CHRONIC OBSTRUCTIVE PULMONARY DISEASE, UNSPECIFIED COPD TYPE (HCC): ICD-10-CM

## 2022-08-09 DIAGNOSIS — E78.2 MIXED HYPERLIPIDEMIA: ICD-10-CM

## 2022-08-09 DIAGNOSIS — Z76.89 ENCOUNTER TO ESTABLISH CARE: ICD-10-CM

## 2022-08-09 DIAGNOSIS — J96.11 CHRONIC RESPIRATORY FAILURE WITH HYPOXIA (HCC): ICD-10-CM

## 2022-08-09 DIAGNOSIS — R60.9 EDEMA, UNSPECIFIED TYPE: ICD-10-CM

## 2022-08-09 DIAGNOSIS — D64.9 ANEMIA, UNSPECIFIED TYPE: ICD-10-CM

## 2022-08-09 DIAGNOSIS — K59.01 SLOW TRANSIT CONSTIPATION: ICD-10-CM

## 2022-08-09 PROCEDURE — 99214 OFFICE O/P EST MOD 30 MIN: CPT | Performed by: NURSE PRACTITIONER

## 2022-08-09 RX ORDER — ATORVASTATIN CALCIUM 40 MG/1
40 TABLET, FILM COATED ORAL DAILY
Qty: 90 TABLET | Refills: 3 | Status: SHIPPED | OUTPATIENT
Start: 2022-08-09 | End: 2023-01-01 | Stop reason: SDUPTHER

## 2022-08-09 RX ORDER — ALBUTEROL SULFATE 90 UG/1
2 AEROSOL, METERED RESPIRATORY (INHALATION) EVERY 4 HOURS PRN
Qty: 1 EACH | Refills: 2 | Status: SHIPPED | OUTPATIENT
Start: 2022-08-09 | End: 2023-01-01

## 2022-08-09 RX ORDER — LISINOPRIL AND HYDROCHLOROTHIAZIDE 12.5; 1 MG/1; MG/1
1 TABLET ORAL DAILY
Qty: 30 TABLET | Refills: 3 | Status: SHIPPED | OUTPATIENT
Start: 2022-08-09 | End: 2022-11-14 | Stop reason: SDUPTHER

## 2022-08-09 RX ORDER — LEVOTHYROXINE SODIUM 0.05 MG/1
TABLET ORAL
Qty: 90 TABLET | Refills: 3 | Status: SHIPPED | OUTPATIENT
Start: 2022-08-09 | End: 2023-01-01

## 2022-08-09 ASSESSMENT — FIBROSIS 4 INDEX: FIB4 SCORE: 1.64

## 2022-08-09 NOTE — ASSESSMENT & PLAN NOTE
Noticed this condition.  Patient reports long history of chronic constipation and having difficult bowel movements.  She is working on increasing bulk fiber in her diet.  She does occasionally have blood on the tissue when wiping however she reports this is from these sheer size of her stools and her anus occasionally bleeds.    Not up-to-date on colorectal cancer screening and refuses orders placed today.

## 2022-08-09 NOTE — ASSESSMENT & PLAN NOTE
"Pleasant 67-year-old female presents today to establish care she has not been seen in over 2 years since the pandemic started due to fear and isolation due to COVID-19.    Patient presents today with oxygen concentrator due to chronic respiratory failure and hypoxia with chronic dyspnea on exertion.  She was a former cigarette smoker.  Previously was followed by cardiology reviewed notes form 2018, dyspnea on exertion is most likely respiratory in nature.    Reviewed pulm previous pulmonology results from 2018 indicating pulmonary emphysema unspecified emphysema type.  Patient reports today that Dulera inhaler resolved symptoms best for her however due to cost was unable to continue on medication.  She does currently use albuterol several times per week for symptoms.    Patient has previous history of breast cancer with breast duct removal however declines annual mammogram screenings.  She is aware the risk associated in doing so.  Patient also declines colorectal cancer screenings.  Today she states in clinic \"if I die I die\".   Reviewed care gaps indicating that she is due for COVID-19 vaccine and pneumonia vaccine however patient declines today.  Patient is aware the risk associated declining vaccinations.  At length discussion about the importance of preventative treatments however patient continues to decline all preventative treatments at this time.      "

## 2022-08-09 NOTE — ASSESSMENT & PLAN NOTE
"Vitals 9/4/2018 9/3/2019 7/20/2022 8/9/2022   WEIGHT 278.5 274.6 291 290.2   HEIGHT  5' 4\" 5' 4\" 5' 4\"   BMI 50.94 kg/m2 47.13 kg/m2 49.95 kg/m2 49.81 kg/m2   Chronic ongoing health concern.  Patient continues to struggle with weight loss.  She reports that she does not eat a lot at home and is unsure why she continues to be unable to lose weight.  She is unable to exercise due to chronic obesity and obstructive pulmonary disease oxygen dependent.  "

## 2022-08-09 NOTE — PROGRESS NOTES
"Chief Complaint   Patient presents with   • New Patient     Est care   • Medication Refill     Albuterol- only generic. Not ventolin brand       Subjective:     HPI:   Mallorie Xie is a 67 y.o. female here to discuss the evaluation and management of:      Encounter to establish care  Pleasant 67-year-old female presents today to establish care she has not been seen in over 2 years since the pandemic started due to fear and isolation due to COVID-19.    Patient presents today with oxygen concentrator due to chronic respiratory failure and hypoxia with chronic dyspnea on exertion.  She was a former cigarette smoker.  Previously was followed by cardiology reviewed notes form 2018, dyspnea on exertion is most likely respiratory in nature.    Reviewed pulm previous pulmonology results from 2018 indicating pulmonary emphysema unspecified emphysema type.  Patient reports today that Dulera inhaler resolved symptoms best for her however due to cost was unable to continue on medication.  She does currently use albuterol several times per week for symptoms.    Patient has previous history of breast cancer with breast duct removal however declines annual mammogram screenings.  She is aware the risk associated in doing so.  Patient also declines colorectal cancer screenings.  Today she states in clinic \"if I die I die\".   Reviewed care gaps indicating that she is due for COVID-19 vaccine and pneumonia vaccine however patient declines today.  Patient is aware the risk associated declining vaccinations.  At length discussion about the importance of preventative treatments however patient continues to decline all preventative treatments at this time.        Chronic respiratory failure with hypoxia (HCC)  Chronic and ongoing health concern without exacerbation.  Patient currently uses albuterol several times per week to help with shortness of breath symptoms with exertion.  She reports today that the majority of her shortness of " "breath is due to her obesity.    Currently on 24/7 oxygen at 3 to 4 L nasal cannula.  Has not followed with pulmonology since 2018.    She reports her at home oxygen's are between 92 and 98%.  With ambulation she reports they dropped down to mid 70's.      Morbid obesity with BMI of 45.0-49.9, adult ([facility])  Vitals 9/4/2018 9/3/2019 7/20/2022 8/9/2022   WEIGHT 278.5 274.6 291 290.2   HEIGHT  5' 4\" 5' 4\" 5' 4\"   BMI 50.94 kg/m2 47.13 kg/m2 49.95 kg/m2 49.81 kg/m2   Chronic ongoing health concern.  Patient continues to struggle with weight loss.  She reports that she does not eat a lot at home and is unsure why she continues to be unable to lose weight.  She is unable to exercise due to chronic obesity and obstructive pulmonary disease oxygen dependent.    Anemia  Component      Latest Ref Rng & Units 8/27/2019 10/7/2020 8/1/2022           7:21 AM  6:10 AM  6:10 AM   WBC      4.8 - 10.8 K/uL 8.6 8.4 6.5   RBC      4.20 - 5.40 M/uL 4.15 (L) 4.23 3.77 (L)   Hemoglobin      12.0 - 16.0 g/dL 12.5 12.5 11.0 (L)   Hematocrit      37.0 - 47.0 % 40.1 40.3 35.6 (L)   MCV      81.4 - 97.8 fL 96.6 95.3 94.4   MCH      27.0 - 33.0 pg 30.1 29.6 29.2   MCHC      33.6 - 35.0 g/dL 31.2 (L) 31.0 (L) 30.9 (L)   RDW      35.9 - 50.0 fL 47.1 48.9 44.5   Platelet Count      164 - 446 K/uL 217 199 225   MPV      9.0 - 12.9 fL 12.0 12.6 11.8   Neutrophils-Polys      44.00 - 72.00 % 73.60 (H) 72.80 (H) 68.50   Lymphocytes      22.00 - 41.00 % 16.10 (L) 16.20 (L) 21.00 (L)   Monocytes      0.00 - 13.40 % 7.40 8.00 7.70     Reviewed labs indicating mild anemia.  Patient does report chronic constipation with a small amount of courtney blood only at her anus when wiping however denies blood in stools or dark tarry stools.  She denies any fatigue, dizziness, headaches, or palpitations.    Chronic and stable shortness of breath.  Oxygen dependent.      Edema  Upon physical exam patient was found to have 1+ bilateral lower leg edema.  Patient does " "report edema changes depending upon food.      Slow transit constipation  Noticed this condition.  Patient reports long history of chronic constipation and having difficult bowel movements.  She is working on increasing bulk fiber in her diet.  She does occasionally have blood on the tissue when wiping however she reports this is from these sheer size of her stools and her anus occasionally bleeds.    Not up-to-date on colorectal cancer screening and refuses orders placed today.        ROS:  Gen: no fevers/chills, no changes in weight  Pulm: Positive per HPI  CV: no chest pain, no palpitations  GI: Positive per HPI  MSk: no myalgias  Neuro: no headaches, no numbness/tingling      Allergies   Allergen Reactions   • Bee Venom Anaphylaxis       Current medicines (including changes today)  Current Outpatient Medications   Medication Sig Dispense Refill   • albuterol 108 (90 Base) MCG/ACT Aero Soln inhalation aerosol Inhale 2 Puffs every four hours as needed for Shortness of Breath. 1 Each 2   • atorvastatin (LIPITOR) 40 MG Tab Take 1 Tablet by mouth every day. 90 Tablet 3   • levothyroxine (SYNTHROID) 50 MCG Tab TAKE 1 TABLET EVERY MORNING  ON AN EMPTY STOMACH. 90 Tablet 3   • lisinopril-hydrochlorothiazide (PRINZIDE) 10-12.5 MG per tablet Take 1 Tablet by mouth every day. 30 Tablet 3   • Mometasone Furo-Formoterol Fum (DULERA) 200-5 MCG/ACT Aerosol Inhale 2 Puffs 2 times a day. 8.8 g 2   • Spacer/Aero-Holding Chambers (COMPACT SPACE CHAMBER) Device Inhale 1 Units by mouth 1 time daily as needed. 1 Device 1   • aspirin 81 MG tablet Take 81 mg by mouth every day.     • Multiple Vitamins-Minerals (MULTIVITAMIN ADULT PO) Take  by mouth.       No current facility-administered medications for this visit.          Objective:       /70 (BP Location: Left arm)   Pulse 86   Temp 35.9 °C (96.6 °F) (Temporal)   Resp 18   Ht 1.626 m (5' 4\")   Wt (!) 132 kg (290 lb 3.2 oz)   SpO2 98%  Body mass index is 49.81 " kg/m².    Physical Exam:  Constitutional: Well-developed and morbidly obese female in NAD. Not diaphoretic. No distress.   Skin: warm, dry, intact  Cardiovascular: Regular rate and rhythm without murmur. Radial pulses are intact and equal bilaterally.  Pulmonary: Diminished in all lung fields. Normal effort. No rales, ronchi, or wheezing.  Abdomen: Soft, non tender, and without distention. Active bowel sounds in all four quadrants.   Extremities: No cyanosis, clubbing, erythema.  1+ bilateral pitting edema  Neurological: Alert and oriented x 3.   Psychiatric:  Behavior, mood, and affect are appropriate.      Assessment and Plan:     The following treatment plan was discussed:  I have reviewed previous notes from previous primary care provider, cardiology, and pulmonology.  Reviewed lab and imaging studies.  Discussed results with patient answered all questions.    Once again patient has refused all cancer screenings, DEXA scan, and in immunizations today.  At length conversation with patient in regards to importance of preventative care however she continues to decline.      Reviewed labs with patient answered all questions.  Patient does have a mild anemia.  Further lab orders have been placed.  Patient also has mildly impaired fasting blood sugar and slightly elevated calcium levels.  We will continue to monitor at future appointments.  Discussed elevated A1c level at 6.3% indicating prediabetes.  Education was provided about appropriate diet lifestyle modifications including decreasing carbs in her diet.  She does understand she is at high risk for developing diabetes.  Reviewed cholesterol levels and TSH levels with patient answered all questions      1. Encounter to establish care    2. Morbid obesity with BMI of 45.0-49.9, adult ([facility])  - Patient identified as having weight management issue.  Appropriate orders and counseling given.    3. Chronic respiratory failure with hypoxia ([facility])    4. Chronic obstructive  pulmonary disease, unspecified COPD type (HCC)  - albuterol 108 (90 Base) MCG/ACT Aero Soln inhalation aerosol; Inhale 2 Puffs every four hours as needed for Shortness of Breath.  Dispense: 1 Each; Refill: 2  - Mometasone Furo-Formoterol Fum (DULERA) 200-5 MCG/ACT Aerosol; Inhale 2 Puffs 2 times a day.  Dispense: 8.8 g; Refill: 2    5. Mixed hyperlipidemia  - atorvastatin (LIPITOR) 40 MG Tab; Take 1 Tablet by mouth every day.  Dispense: 90 Tablet; Refill: 3    6. Abnormal TSH  - levothyroxine (SYNTHROID) 50 MCG Tab; TAKE 1 TABLET EVERY MORNING  ON AN EMPTY STOMACH.  Dispense: 90 Tablet; Refill: 3    7. Anemia, unspecified type  - Comp Metabolic Panel; Future  - CBC WITH DIFFERENTIAL; Future  - IRON/TOTAL IRON BIND; Future  - FERRITIN; Future  - VITAMIN B12; Future  - FOLATE; Future    8. Edema, unspecified type  - lisinopril-hydrochlorothiazide (PRINZIDE) 10-12.5 MG per tablet; Take 1 Tablet by mouth every day.  Dispense: 30 Tablet; Refill: 3    9. Slow transit constipation      Any change or worsening of signs or symptoms, patient encouraged to follow-up or report to urgent care or emergency room for further evaluation. Patient verbalizes understanding and agrees.    Follow-Up: Return in about 6 months (around 2/9/2023) for Follow up labs.      PLEASE NOTE: This dictation was created using voice recognition software. I have made every reasonable attempt to correct obvious errors, but I expect that there are errors of grammar and possibly content that I did not discover before finalizing the note.

## 2022-08-09 NOTE — ASSESSMENT & PLAN NOTE
Upon physical exam patient was found to have 1+ bilateral lower leg edema.  Patient does report edema changes depending upon food.

## 2022-08-09 NOTE — ASSESSMENT & PLAN NOTE
Chronic and ongoing health concern without exacerbation.  Patient currently uses albuterol several times per week to help with shortness of breath symptoms with exertion.  She reports today that the majority of her shortness of breath is due to her obesity.    Currently on 24/7 oxygen at 3 to 4 L nasal cannula.  Has not followed with pulmonology since 2018.    She reports her at home oxygen's are between 92 and 98%.  With ambulation she reports they dropped down to mid 70's.

## 2022-08-09 NOTE — ASSESSMENT & PLAN NOTE
Component      Latest Ref Rng & Units 8/27/2019 10/7/2020 8/1/2022           7:21 AM  6:10 AM  6:10 AM   WBC      4.8 - 10.8 K/uL 8.6 8.4 6.5   RBC      4.20 - 5.40 M/uL 4.15 (L) 4.23 3.77 (L)   Hemoglobin      12.0 - 16.0 g/dL 12.5 12.5 11.0 (L)   Hematocrit      37.0 - 47.0 % 40.1 40.3 35.6 (L)   MCV      81.4 - 97.8 fL 96.6 95.3 94.4   MCH      27.0 - 33.0 pg 30.1 29.6 29.2   MCHC      33.6 - 35.0 g/dL 31.2 (L) 31.0 (L) 30.9 (L)   RDW      35.9 - 50.0 fL 47.1 48.9 44.5   Platelet Count      164 - 446 K/uL 217 199 225   MPV      9.0 - 12.9 fL 12.0 12.6 11.8   Neutrophils-Polys      44.00 - 72.00 % 73.60 (H) 72.80 (H) 68.50   Lymphocytes      22.00 - 41.00 % 16.10 (L) 16.20 (L) 21.00 (L)   Monocytes      0.00 - 13.40 % 7.40 8.00 7.70     Reviewed labs indicating mild anemia.  Patient does report chronic constipation with a small amount of courtney blood only at her anus when wiping however denies blood in stools or dark tarry stools.  She denies any fatigue, dizziness, headaches, or palpitations.    Chronic and stable shortness of breath.  Oxygen dependent.

## 2022-08-11 ENCOUNTER — PATIENT MESSAGE (OUTPATIENT)
Dept: MEDICAL GROUP | Facility: PHYSICIAN GROUP | Age: 67
End: 2022-08-11
Payer: MEDICARE

## 2022-08-12 ENCOUNTER — TELEPHONE (OUTPATIENT)
Dept: MEDICAL GROUP | Facility: PHYSICIAN GROUP | Age: 67
End: 2022-08-12
Payer: MEDICARE

## 2022-08-12 NOTE — TELEPHONE ENCOUNTER
Prior auth submitted.     MEDICATION PRIOR AUTHORIZATION NEEDED:     1. Name of Medication: Dulera Inhaler     2. Requested By (Name of Pharmacy): [facility]     3. Is insurance on file current? yes     4. What is the name & phone number of the 3rd party payor? Covermymeds     Key: MYRNA

## 2022-08-12 NOTE — TELEPHONE ENCOUNTER
Prior auth submitted.    MEDICATION PRIOR AUTHORIZATION NEEDED:    1. Name of Medication: Dulera Inhaler    2. Requested By (Name of Pharmacy): [facility]     3. Is insurance on file current? yes    4. What is the name & phone number of the 3rd party payor? Covermymeds    Key: MYRNA

## 2022-08-23 ENCOUNTER — PATIENT MESSAGE (OUTPATIENT)
Dept: MEDICAL GROUP | Facility: PHYSICIAN GROUP | Age: 67
End: 2022-08-23
Payer: MEDICARE

## 2022-09-07 RX ORDER — MOMETASONE FUROATE AND FORMOTEROL FUMARATE DIHYDRATE 200; 5 UG/1; UG/1
AEROSOL RESPIRATORY (INHALATION)
Refills: 2 | Status: CANCELLED | OUTPATIENT
Start: 2022-09-07

## 2022-09-07 NOTE — TELEPHONE ENCOUNTER
New prescription has been sent back for Dulera to [facility] mail the prior Auth has been approved.

## 2022-11-03 ENCOUNTER — PATIENT MESSAGE (OUTPATIENT)
Dept: HEALTH INFORMATION MANAGEMENT | Facility: OTHER | Age: 67
End: 2022-11-03

## 2022-11-09 ENCOUNTER — PATIENT MESSAGE (OUTPATIENT)
Dept: MEDICAL GROUP | Facility: PHYSICIAN GROUP | Age: 67
End: 2022-11-09
Payer: MEDICARE

## 2022-11-09 DIAGNOSIS — R60.9 EDEMA, UNSPECIFIED TYPE: ICD-10-CM

## 2022-11-14 RX ORDER — LISINOPRIL AND HYDROCHLOROTHIAZIDE 12.5; 1 MG/1; MG/1
1 TABLET ORAL DAILY
Qty: 90 TABLET | Refills: 2 | Status: SHIPPED | OUTPATIENT
Start: 2022-11-14 | End: 2023-01-01 | Stop reason: SDUPTHER

## 2022-11-14 NOTE — PATIENT COMMUNICATION
Received request via: Patient    Was the patient seen in the last year in this department? Yes    Does the patient have an active prescription (recently filled or refills available) for medication(s) requested? No    Does the patient have care home Plus and need 100 day supply (blood pressure, diabetes and cholesterol meds only)? Patient does not have SCP

## 2023-01-01 ENCOUNTER — APPOINTMENT (OUTPATIENT)
Dept: RADIOLOGY | Facility: MEDICAL CENTER | Age: 68
DRG: 208 | End: 2023-01-01
Payer: MEDICARE

## 2023-01-01 ENCOUNTER — HOSPITAL ENCOUNTER (INPATIENT)
Facility: MEDICAL CENTER | Age: 68
LOS: 4 days | DRG: 193 | End: 2023-10-24
Attending: STUDENT IN AN ORGANIZED HEALTH CARE EDUCATION/TRAINING PROGRAM | Admitting: INTERNAL MEDICINE
Payer: MEDICARE

## 2023-01-01 ENCOUNTER — TELEMEDICINE (OUTPATIENT)
Dept: MEDICAL GROUP | Facility: PHYSICIAN GROUP | Age: 68
End: 2023-01-01
Payer: MEDICARE

## 2023-01-01 ENCOUNTER — APPOINTMENT (OUTPATIENT)
Dept: RADIOLOGY | Facility: MEDICAL CENTER | Age: 68
DRG: 208 | End: 2023-01-01
Attending: EMERGENCY MEDICINE
Payer: MEDICARE

## 2023-01-01 ENCOUNTER — HOSPITAL ENCOUNTER (INPATIENT)
Dept: RADIOLOGY | Facility: MEDICAL CENTER | Age: 68
DRG: 208 | End: 2023-01-01
Attending: INTERNAL MEDICINE | Admitting: STUDENT IN AN ORGANIZED HEALTH CARE EDUCATION/TRAINING PROGRAM
Payer: MEDICARE

## 2023-01-01 ENCOUNTER — APPOINTMENT (OUTPATIENT)
Dept: RADIOLOGY | Facility: MEDICAL CENTER | Age: 68
DRG: 208 | End: 2023-01-01
Attending: STUDENT IN AN ORGANIZED HEALTH CARE EDUCATION/TRAINING PROGRAM
Payer: MEDICARE

## 2023-01-01 ENCOUNTER — OFFICE VISIT (OUTPATIENT)
Dept: MEDICAL GROUP | Facility: PHYSICIAN GROUP | Age: 68
End: 2023-01-01
Payer: MEDICARE

## 2023-01-01 ENCOUNTER — APPOINTMENT (OUTPATIENT)
Dept: RADIOLOGY | Facility: MEDICAL CENTER | Age: 68
DRG: 193 | End: 2023-01-01
Attending: STUDENT IN AN ORGANIZED HEALTH CARE EDUCATION/TRAINING PROGRAM
Payer: MEDICARE

## 2023-01-01 ENCOUNTER — APPOINTMENT (OUTPATIENT)
Dept: RADIOLOGY | Facility: MEDICAL CENTER | Age: 68
DRG: 208 | End: 2023-01-01
Attending: INTERNAL MEDICINE
Payer: MEDICARE

## 2023-01-01 ENCOUNTER — PATIENT OUTREACH (OUTPATIENT)
Dept: SCHEDULING | Facility: IMAGING CENTER | Age: 68
End: 2023-01-01
Payer: MEDICARE

## 2023-01-01 ENCOUNTER — HOSPITAL ENCOUNTER (OUTPATIENT)
Dept: LAB | Facility: MEDICAL CENTER | Age: 68
End: 2023-02-02
Attending: NURSE PRACTITIONER
Payer: MEDICARE

## 2023-01-01 ENCOUNTER — PHARMACY VISIT (OUTPATIENT)
Dept: PHARMACY | Facility: MEDICAL CENTER | Age: 68
End: 2023-01-01
Payer: MEDICARE

## 2023-01-01 ENCOUNTER — APPOINTMENT (OUTPATIENT)
Dept: CARDIOLOGY | Facility: MEDICAL CENTER | Age: 68
DRG: 208 | End: 2023-01-01
Attending: STUDENT IN AN ORGANIZED HEALTH CARE EDUCATION/TRAINING PROGRAM
Payer: MEDICARE

## 2023-01-01 ENCOUNTER — HOSPITAL ENCOUNTER (INPATIENT)
Facility: MEDICAL CENTER | Age: 68
LOS: 1 days | DRG: 208 | End: 2023-12-10
Attending: STUDENT IN AN ORGANIZED HEALTH CARE EDUCATION/TRAINING PROGRAM | Admitting: STUDENT IN AN ORGANIZED HEALTH CARE EDUCATION/TRAINING PROGRAM
Payer: MEDICARE

## 2023-01-01 ENCOUNTER — HOSPITAL ENCOUNTER (OUTPATIENT)
Dept: LAB | Facility: MEDICAL CENTER | Age: 68
End: 2023-08-15
Attending: NURSE PRACTITIONER
Payer: MEDICARE

## 2023-01-01 ENCOUNTER — TELEPHONE (OUTPATIENT)
Dept: HEALTH INFORMATION MANAGEMENT | Facility: OTHER | Age: 68
End: 2023-01-01

## 2023-01-01 VITALS
OXYGEN SATURATION: 94 % | BODY MASS INDEX: 49.17 KG/M2 | SYSTOLIC BLOOD PRESSURE: 149 MMHG | WEIGHT: 288 LBS | HEIGHT: 64 IN | HEART RATE: 73 BPM | DIASTOLIC BLOOD PRESSURE: 89 MMHG

## 2023-01-01 VITALS
RESPIRATION RATE: 20 BRPM | WEIGHT: 284.61 LBS | HEIGHT: 63 IN | OXYGEN SATURATION: 95 % | SYSTOLIC BLOOD PRESSURE: 142 MMHG | BODY MASS INDEX: 50.43 KG/M2 | HEART RATE: 87 BPM | TEMPERATURE: 97.1 F | DIASTOLIC BLOOD PRESSURE: 74 MMHG

## 2023-01-01 VITALS
DIASTOLIC BLOOD PRESSURE: 62 MMHG | SYSTOLIC BLOOD PRESSURE: 128 MMHG | WEIGHT: 287.6 LBS | HEART RATE: 90 BPM | OXYGEN SATURATION: 95 % | HEIGHT: 64 IN | BODY MASS INDEX: 49.1 KG/M2 | RESPIRATION RATE: 22 BRPM | TEMPERATURE: 97 F

## 2023-01-01 VITALS
WEIGHT: 271.61 LBS | TEMPERATURE: 99 F | BODY MASS INDEX: 48.12 KG/M2 | SYSTOLIC BLOOD PRESSURE: 85 MMHG | DIASTOLIC BLOOD PRESSURE: 51 MMHG | HEIGHT: 63 IN

## 2023-01-01 VITALS — BODY MASS INDEX: 52.26 KG/M2 | WEIGHT: 284 LBS | HEIGHT: 62 IN

## 2023-01-01 DIAGNOSIS — E03.9 ACQUIRED HYPOTHYROIDISM: ICD-10-CM

## 2023-01-01 DIAGNOSIS — E55.9 VITAMIN D DEFICIENCY: ICD-10-CM

## 2023-01-01 DIAGNOSIS — Z09 HOSPITAL DISCHARGE FOLLOW-UP: ICD-10-CM

## 2023-01-01 DIAGNOSIS — J44.9 CHRONIC OBSTRUCTIVE PULMONARY DISEASE, UNSPECIFIED COPD TYPE (HCC): ICD-10-CM

## 2023-01-01 DIAGNOSIS — I10 ESSENTIAL HYPERTENSION: ICD-10-CM

## 2023-01-01 DIAGNOSIS — J18.9 PNEUMONIA DUE TO INFECTIOUS ORGANISM, UNSPECIFIED LATERALITY, UNSPECIFIED PART OF LUNG: ICD-10-CM

## 2023-01-01 DIAGNOSIS — D64.9 ANEMIA, UNSPECIFIED TYPE: ICD-10-CM

## 2023-01-01 DIAGNOSIS — E78.2 MIXED HYPERLIPIDEMIA: ICD-10-CM

## 2023-01-01 DIAGNOSIS — R21 RASH: ICD-10-CM

## 2023-01-01 DIAGNOSIS — Z99.81 OXYGEN DEPENDENT: ICD-10-CM

## 2023-01-01 DIAGNOSIS — E66.01 MORBID (SEVERE) OBESITY DUE TO EXCESS CALORIES (HCC): ICD-10-CM

## 2023-01-01 DIAGNOSIS — R73.03 PREDIABETES: ICD-10-CM

## 2023-01-01 DIAGNOSIS — R09.02 HYPOXIA: ICD-10-CM

## 2023-01-01 DIAGNOSIS — I50.9 CHRONIC CONGESTIVE HEART FAILURE, UNSPECIFIED HEART FAILURE TYPE (HCC): ICD-10-CM

## 2023-01-01 DIAGNOSIS — J90 PLEURAL EFFUSION ON RIGHT: ICD-10-CM

## 2023-01-01 DIAGNOSIS — R60.9 EDEMA, UNSPECIFIED TYPE: ICD-10-CM

## 2023-01-01 DIAGNOSIS — G47.33 OSA (OBSTRUCTIVE SLEEP APNEA): ICD-10-CM

## 2023-01-01 DIAGNOSIS — D50.9 IRON DEFICIENCY ANEMIA, UNSPECIFIED IRON DEFICIENCY ANEMIA TYPE: ICD-10-CM

## 2023-01-01 DIAGNOSIS — J18.9 PNEUMONIA OF RIGHT LUNG DUE TO INFECTIOUS ORGANISM, UNSPECIFIED PART OF LUNG: ICD-10-CM

## 2023-01-01 DIAGNOSIS — J96.11 CHRONIC RESPIRATORY FAILURE WITH HYPOXIA (HCC): ICD-10-CM

## 2023-01-01 DIAGNOSIS — E87.1 HYPONATREMIA: ICD-10-CM

## 2023-01-01 DIAGNOSIS — I48.91 ATRIAL FIBRILLATION, UNSPECIFIED TYPE (HCC): ICD-10-CM

## 2023-01-01 DIAGNOSIS — R73.01 IMPAIRED FASTING BLOOD SUGAR: ICD-10-CM

## 2023-01-01 DIAGNOSIS — E04.1 THYROID NODULE: ICD-10-CM

## 2023-01-01 DIAGNOSIS — Z00.00 HEALTHCARE MAINTENANCE: ICD-10-CM

## 2023-01-01 DIAGNOSIS — J43.9 PULMONARY EMPHYSEMA, UNSPECIFIED EMPHYSEMA TYPE (HCC): ICD-10-CM

## 2023-01-01 DIAGNOSIS — R79.89 ABNORMAL TSH: ICD-10-CM

## 2023-01-01 LAB
25(OH)D3 SERPL-MCNC: 64 NG/ML (ref 30–100)
ALBUMIN SERPL BCP-MCNC: 3.1 G/DL (ref 3.2–4.9)
ALBUMIN SERPL BCP-MCNC: 3.2 G/DL (ref 3.2–4.9)
ALBUMIN SERPL BCP-MCNC: 3.3 G/DL (ref 3.2–4.9)
ALBUMIN SERPL BCP-MCNC: 4 G/DL (ref 3.2–4.9)
ALBUMIN SERPL BCP-MCNC: 4 G/DL (ref 3.2–4.9)
ALBUMIN SERPL BCP-MCNC: 4.5 G/DL (ref 3.2–4.9)
ALBUMIN SERPL BCP-MCNC: 4.6 G/DL (ref 3.2–4.9)
ALBUMIN/GLOB SERPL: 1 G/DL
ALBUMIN/GLOB SERPL: 1.3 G/DL
ALBUMIN/GLOB SERPL: 1.3 G/DL
ALBUMIN/GLOB SERPL: 1.4 G/DL
ALBUMIN/GLOB SERPL: 1.5 G/DL
ALBUMIN/GLOB SERPL: 1.7 G/DL
ALBUMIN/GLOB SERPL: 1.7 G/DL
ALP SERPL-CCNC: 101 U/L (ref 30–99)
ALP SERPL-CCNC: 101 U/L (ref 30–99)
ALP SERPL-CCNC: 865 U/L (ref 30–99)
ALP SERPL-CCNC: 882 U/L (ref 30–99)
ALP SERPL-CCNC: 899 U/L (ref 30–99)
ALP SERPL-CCNC: 98 U/L (ref 30–99)
ALP SERPL-CCNC: 99 U/L (ref 30–99)
ALT SERPL-CCNC: 169 U/L (ref 2–50)
ALT SERPL-CCNC: 173 U/L (ref 2–50)
ALT SERPL-CCNC: 32 U/L (ref 2–50)
ALT SERPL-CCNC: 35 U/L (ref 2–50)
ALT SERPL-CCNC: 41 U/L (ref 2–50)
ALT SERPL-CCNC: 41 U/L (ref 2–50)
ALT SERPL-CCNC: 446 U/L (ref 2–50)
AMMONIA PLAS-SCNC: 112 UMOL/L (ref 11–45)
ANION GAP SERPL CALC-SCNC: 10 MMOL/L (ref 7–16)
ANION GAP SERPL CALC-SCNC: 12 MMOL/L (ref 7–16)
ANION GAP SERPL CALC-SCNC: 15 MMOL/L (ref 7–16)
ANION GAP SERPL CALC-SCNC: 20 MMOL/L (ref 7–16)
ANION GAP SERPL CALC-SCNC: 6 MMOL/L (ref 7–16)
ANION GAP SERPL CALC-SCNC: 7 MMOL/L (ref 7–16)
ANION GAP SERPL CALC-SCNC: 8 MMOL/L (ref 7–16)
ANION GAP SERPL CALC-SCNC: 9 MMOL/L (ref 7–16)
ANISOCYTOSIS BLD QL SMEAR: ABNORMAL
APPEARANCE UR: CLEAR
APTT PPP: 27 SEC (ref 24.7–36)
APTT PPP: 27.3 SEC (ref 24.7–36)
ARTERIAL PATENCY WRIST A: ABNORMAL
AST SERPL-CCNC: 203 U/L (ref 12–45)
AST SERPL-CCNC: 213 U/L (ref 12–45)
AST SERPL-CCNC: 24 U/L (ref 12–45)
AST SERPL-CCNC: 34 U/L (ref 12–45)
AST SERPL-CCNC: 50 U/L (ref 12–45)
AST SERPL-CCNC: 505 U/L (ref 12–45)
AST SERPL-CCNC: 53 U/L (ref 12–45)
BACTERIA #/AREA URNS HPF: NEGATIVE /HPF
BACTERIA SPEC RESP CULT: NORMAL
BASE EXCESS BLDA CALC-SCNC: 5 MMOL/L (ref -4–3)
BASE EXCESS BLDA CALC-SCNC: 7 MMOL/L (ref -4–3)
BASE EXCESS BLDA CALC-SCNC: 8 MMOL/L (ref -4–3)
BASOPHILS # BLD AUTO: 0 % (ref 0–1.8)
BASOPHILS # BLD AUTO: 0.1 % (ref 0–1.8)
BASOPHILS # BLD AUTO: 0.2 % (ref 0–1.8)
BASOPHILS # BLD AUTO: 0.6 % (ref 0–1.8)
BASOPHILS # BLD AUTO: 0.7 % (ref 0–1.8)
BASOPHILS # BLD: 0 K/UL (ref 0–0.12)
BASOPHILS # BLD: 0.01 K/UL (ref 0–0.12)
BASOPHILS # BLD: 0.02 K/UL (ref 0–0.12)
BASOPHILS # BLD: 0.04 K/UL (ref 0–0.12)
BASOPHILS # BLD: 0.06 K/UL (ref 0–0.12)
BILIRUB SERPL-MCNC: 0.5 MG/DL (ref 0.1–1.5)
BILIRUB SERPL-MCNC: 0.5 MG/DL (ref 0.1–1.5)
BILIRUB SERPL-MCNC: 0.6 MG/DL (ref 0.1–1.5)
BILIRUB SERPL-MCNC: 0.7 MG/DL (ref 0.1–1.5)
BILIRUB SERPL-MCNC: 1.7 MG/DL (ref 0.1–1.5)
BILIRUB SERPL-MCNC: 1.9 MG/DL (ref 0.1–1.5)
BILIRUB SERPL-MCNC: 2.4 MG/DL (ref 0.1–1.5)
BILIRUB UR QL STRIP.AUTO: ABNORMAL
BLOOD CULTURE HOLD CXBCH: NORMAL
BODY TEMPERATURE: 37.2 CENTIGRADE
BODY TEMPERATURE: ABNORMAL DEGREES
BODY TEMPERATURE: ABNORMAL DEGREES
BREATHS SETTING VENT: 26
BREATHS SETTING VENT: 30
BUN SERPL-MCNC: 10 MG/DL (ref 8–22)
BUN SERPL-MCNC: 11 MG/DL (ref 8–22)
BUN SERPL-MCNC: 11 MG/DL (ref 8–22)
BUN SERPL-MCNC: 12 MG/DL (ref 8–22)
BUN SERPL-MCNC: 15 MG/DL (ref 8–22)
BUN SERPL-MCNC: 16 MG/DL (ref 8–22)
BUN SERPL-MCNC: 22 MG/DL (ref 8–22)
BUN SERPL-MCNC: 44 MG/DL (ref 8–22)
BUN SERPL-MCNC: 45 MG/DL (ref 8–22)
BUN SERPL-MCNC: 51 MG/DL (ref 8–22)
CALCIUM ALBUM COR SERPL-MCNC: 10 MG/DL (ref 8.5–10.5)
CALCIUM ALBUM COR SERPL-MCNC: 10.7 MG/DL (ref 8.5–10.5)
CALCIUM ALBUM COR SERPL-MCNC: 11 MG/DL (ref 8.5–10.5)
CALCIUM ALBUM COR SERPL-MCNC: 11.7 MG/DL (ref 8.5–10.5)
CALCIUM ALBUM COR SERPL-MCNC: 9.4 MG/DL (ref 8.5–10.5)
CALCIUM ALBUM COR SERPL-MCNC: 9.4 MG/DL (ref 8.5–10.5)
CALCIUM ALBUM COR SERPL-MCNC: 9.8 MG/DL (ref 8.5–10.5)
CALCIUM SERPL-MCNC: 10 MG/DL (ref 8.5–10.5)
CALCIUM SERPL-MCNC: 10.1 MG/DL (ref 8.5–10.5)
CALCIUM SERPL-MCNC: 10.3 MG/DL (ref 8.5–10.5)
CALCIUM SERPL-MCNC: 10.4 MG/DL (ref 8.5–10.5)
CALCIUM SERPL-MCNC: 10.4 MG/DL (ref 8.5–10.5)
CALCIUM SERPL-MCNC: 11.1 MG/DL (ref 8.5–10.5)
CALCIUM SERPL-MCNC: 8.9 MG/DL (ref 8.5–10.5)
CALCIUM SERPL-MCNC: 9.4 MG/DL (ref 8.5–10.5)
CALCIUM SERPL-MCNC: 9.4 MG/DL (ref 8.5–10.5)
CALCIUM SERPL-MCNC: 9.5 MG/DL (ref 8.5–10.5)
CHLORIDE SERPL-SCNC: 100 MMOL/L (ref 96–112)
CHLORIDE SERPL-SCNC: 76 MMOL/L (ref 96–112)
CHLORIDE SERPL-SCNC: 78 MMOL/L (ref 96–112)
CHLORIDE SERPL-SCNC: 84 MMOL/L (ref 96–112)
CHLORIDE SERPL-SCNC: 87 MMOL/L (ref 96–112)
CHLORIDE SERPL-SCNC: 91 MMOL/L (ref 96–112)
CHLORIDE SERPL-SCNC: 96 MMOL/L (ref 96–112)
CHLORIDE SERPL-SCNC: 97 MMOL/L (ref 96–112)
CHLORIDE SERPL-SCNC: 97 MMOL/L (ref 96–112)
CHLORIDE SERPL-SCNC: 98 MMOL/L (ref 96–112)
CHOLEST SERPL-MCNC: 132 MG/DL (ref 100–199)
CO2 BLDA-SCNC: 35 MMOL/L (ref 20–33)
CO2 BLDA-SCNC: 42 MMOL/L (ref 20–33)
CO2 SERPL-SCNC: 27 MMOL/L (ref 20–33)
CO2 SERPL-SCNC: 28 MMOL/L (ref 20–33)
CO2 SERPL-SCNC: 30 MMOL/L (ref 20–33)
CO2 SERPL-SCNC: 31 MMOL/L (ref 20–33)
CO2 SERPL-SCNC: 33 MMOL/L (ref 20–33)
CO2 SERPL-SCNC: 34 MMOL/L (ref 20–33)
CO2 SERPL-SCNC: 34 MMOL/L (ref 20–33)
CO2 SERPL-SCNC: 35 MMOL/L (ref 20–33)
CO2 SERPL-SCNC: 36 MMOL/L (ref 20–33)
CO2 SERPL-SCNC: 38 MMOL/L (ref 20–33)
COLOR UR: YELLOW
COMMENT 1642: NORMAL
CORTIS SERPL-MCNC: 4.4 UG/DL (ref 0–23)
CREAT SERPL-MCNC: 0.39 MG/DL (ref 0.5–1.4)
CREAT SERPL-MCNC: 0.52 MG/DL (ref 0.5–1.4)
CREAT SERPL-MCNC: 0.55 MG/DL (ref 0.5–1.4)
CREAT SERPL-MCNC: 0.58 MG/DL (ref 0.5–1.4)
CREAT SERPL-MCNC: 0.68 MG/DL (ref 0.5–1.4)
CREAT SERPL-MCNC: 0.71 MG/DL (ref 0.5–1.4)
CREAT SERPL-MCNC: 0.75 MG/DL (ref 0.5–1.4)
CREAT SERPL-MCNC: 0.78 MG/DL (ref 0.5–1.4)
CREAT SERPL-MCNC: 0.85 MG/DL (ref 0.5–1.4)
CREAT SERPL-MCNC: 1.33 MG/DL (ref 0.5–1.4)
CYTOLOGY REG CYTOL: NORMAL
D DIMER PPP IA.FEU-MCNC: 2.01 UG/ML (FEU) (ref 0–0.5)
DELSYS IDSYS: ABNORMAL
DELSYS IDSYS: ABNORMAL
EKG IMPRESSION: NORMAL
END TIDAL CARBON DIOXIDE IECO2: 44 MMHG
EOSINOPHIL # BLD AUTO: 0 K/UL (ref 0–0.51)
EOSINOPHIL # BLD AUTO: 0 K/UL (ref 0–0.51)
EOSINOPHIL # BLD AUTO: 0.02 K/UL (ref 0–0.51)
EOSINOPHIL # BLD AUTO: 0.08 K/UL (ref 0–0.51)
EOSINOPHIL # BLD AUTO: 0.1 K/UL (ref 0–0.51)
EOSINOPHIL NFR BLD: 0 % (ref 0–6.9)
EOSINOPHIL NFR BLD: 0 % (ref 0–6.9)
EOSINOPHIL NFR BLD: 0.2 % (ref 0–6.9)
EOSINOPHIL NFR BLD: 1 % (ref 0–6.9)
EOSINOPHIL NFR BLD: 1.5 % (ref 0–6.9)
EPI CELLS #/AREA URNS HPF: NEGATIVE /HPF
ERYTHROCYTE [DISTWIDTH] IN BLOOD BY AUTOMATED COUNT: 39 FL (ref 35.9–50)
ERYTHROCYTE [DISTWIDTH] IN BLOOD BY AUTOMATED COUNT: 39.1 FL (ref 35.9–50)
ERYTHROCYTE [DISTWIDTH] IN BLOOD BY AUTOMATED COUNT: 46.6 FL (ref 35.9–50)
ERYTHROCYTE [DISTWIDTH] IN BLOOD BY AUTOMATED COUNT: 50.1 FL (ref 35.9–50)
ERYTHROCYTE [DISTWIDTH] IN BLOOD BY AUTOMATED COUNT: 53 FL (ref 35.9–50)
ERYTHROCYTE [DISTWIDTH] IN BLOOD BY AUTOMATED COUNT: 54.3 FL (ref 35.9–50)
EST. AVERAGE GLUCOSE BLD GHB EST-MCNC: 131 MG/DL
FASTING STATUS PATIENT QL REPORTED: NORMAL
FERRITIN SERPL-MCNC: 78.2 NG/ML (ref 10–291)
FLUAV RNA SPEC QL NAA+PROBE: NEGATIVE
FLUBV RNA SPEC QL NAA+PROBE: NEGATIVE
FOLATE SERPL-MCNC: 27.4 NG/ML
GFR SERPLBLD CREATININE-BSD FMLA CKD-EPI: 100 ML/MIN/1.73 M 2
GFR SERPLBLD CREATININE-BSD FMLA CKD-EPI: 107 ML/MIN/1.73 M 2
GFR SERPLBLD CREATININE-BSD FMLA CKD-EPI: 43 ML/MIN/1.73 M 2
GFR SERPLBLD CREATININE-BSD FMLA CKD-EPI: 73 ML/MIN/1.73 M 2
GFR SERPLBLD CREATININE-BSD FMLA CKD-EPI: 81 ML/MIN/1.73 M 2
GFR SERPLBLD CREATININE-BSD FMLA CKD-EPI: 86 ML/MIN/1.73 M 2
GFR SERPLBLD CREATININE-BSD FMLA CKD-EPI: 91 ML/MIN/1.73 M 2
GFR SERPLBLD CREATININE-BSD FMLA CKD-EPI: 93 ML/MIN/1.73 M 2
GFR SERPLBLD CREATININE-BSD FMLA CKD-EPI: 97 ML/MIN/1.73 M 2
GFR SERPLBLD CREATININE-BSD FMLA CKD-EPI: 98 ML/MIN/1.73 M 2
GLOBULIN SER CALC-MCNC: 2.3 G/DL (ref 1.9–3.5)
GLOBULIN SER CALC-MCNC: 2.6 G/DL (ref 1.9–3.5)
GLOBULIN SER CALC-MCNC: 2.6 G/DL (ref 1.9–3.5)
GLOBULIN SER CALC-MCNC: 2.7 G/DL (ref 1.9–3.5)
GLOBULIN SER CALC-MCNC: 2.7 G/DL (ref 1.9–3.5)
GLOBULIN SER CALC-MCNC: 2.8 G/DL (ref 1.9–3.5)
GLOBULIN SER CALC-MCNC: 3.2 G/DL (ref 1.9–3.5)
GLUCOSE BLD STRIP.AUTO-MCNC: 124 MG/DL (ref 65–99)
GLUCOSE SERPL-MCNC: 104 MG/DL (ref 65–99)
GLUCOSE SERPL-MCNC: 107 MG/DL (ref 65–99)
GLUCOSE SERPL-MCNC: 108 MG/DL (ref 65–99)
GLUCOSE SERPL-MCNC: 111 MG/DL (ref 65–99)
GLUCOSE SERPL-MCNC: 112 MG/DL (ref 65–99)
GLUCOSE SERPL-MCNC: 115 MG/DL (ref 65–99)
GLUCOSE SERPL-MCNC: 124 MG/DL (ref 65–99)
GLUCOSE SERPL-MCNC: 128 MG/DL (ref 65–99)
GLUCOSE SERPL-MCNC: 213 MG/DL (ref 65–99)
GLUCOSE SERPL-MCNC: 85 MG/DL (ref 65–99)
GLUCOSE UR STRIP.AUTO-MCNC: 100 MG/DL
GRAM STN SPEC: NORMAL
HAV IGM SERPL QL IA: NORMAL
HBA1C MFR BLD: 6.2 % (ref 4–5.6)
HBV CORE IGM SER QL: NORMAL
HBV SURFACE AG SER QL: NORMAL
HCO3 BLDA-SCNC: 32.8 MMOL/L (ref 17–25)
HCO3 BLDA-SCNC: 38 MMOL/L (ref 17–25)
HCO3 BLDA-SCNC: 39.4 MMOL/L (ref 17–25)
HCT VFR BLD AUTO: 33.5 % (ref 37–47)
HCT VFR BLD AUTO: 34.5 % (ref 37–47)
HCT VFR BLD AUTO: 38 % (ref 37–47)
HCT VFR BLD AUTO: 39.5 % (ref 37–47)
HCT VFR BLD AUTO: 39.5 % (ref 37–47)
HCT VFR BLD AUTO: 40.3 % (ref 37–47)
HCV AB SER QL: NORMAL
HDLC SERPL-MCNC: 55 MG/DL
HGB BLD-MCNC: 11.7 G/DL (ref 12–16)
HGB BLD-MCNC: 11.8 G/DL (ref 12–16)
HGB BLD-MCNC: 11.8 G/DL (ref 12–16)
HGB BLD-MCNC: 12 G/DL (ref 12–16)
HGB BLD-MCNC: 12 G/DL (ref 12–16)
HGB BLD-MCNC: 12.3 G/DL (ref 12–16)
HOROWITZ INDEX BLDA+IHG-RTO: 108 MM[HG]
HOROWITZ INDEX BLDA+IHG-RTO: 90 MM[HG]
HYALINE CASTS #/AREA URNS LPF: NORMAL /LPF
IMM GRANULOCYTES # BLD AUTO: 0.01 K/UL (ref 0–0.11)
IMM GRANULOCYTES # BLD AUTO: 0.03 K/UL (ref 0–0.11)
IMM GRANULOCYTES # BLD AUTO: 0.03 K/UL (ref 0–0.11)
IMM GRANULOCYTES # BLD AUTO: 0.04 K/UL (ref 0–0.11)
IMM GRANULOCYTES NFR BLD AUTO: 0.2 % (ref 0–0.9)
IMM GRANULOCYTES NFR BLD AUTO: 0.3 % (ref 0–0.9)
IMM GRANULOCYTES NFR BLD AUTO: 0.4 % (ref 0–0.9)
IMM GRANULOCYTES NFR BLD AUTO: 0.4 % (ref 0–0.9)
INHALED O2 FLOW RATE: ABNORMAL L/MIN
INR PPP: 1.21 (ref 0.87–1.13)
INR PPP: 1.21 (ref 0.87–1.13)
IRON SATN MFR SERPL: 20 % (ref 15–55)
IRON SERPL-MCNC: 65 UG/DL (ref 40–170)
KETONES UR STRIP.AUTO-MCNC: ABNORMAL MG/DL
LACTATE BLD-SCNC: 2.9 MMOL/L (ref 0.5–2)
LACTATE SERPL-SCNC: 1.3 MMOL/L (ref 0.5–2)
LACTATE SERPL-SCNC: 1.7 MMOL/L (ref 0.5–2)
LACTATE SERPL-SCNC: 2.9 MMOL/L (ref 0.5–2)
LACTATE SERPL-SCNC: 3.1 MMOL/L (ref 0.5–2)
LDLC SERPL CALC-MCNC: 54 MG/DL
LEUKOCYTE ESTERASE UR QL STRIP.AUTO: NEGATIVE
LIPASE SERPL-CCNC: 62 U/L (ref 11–82)
LV EJECT FRACT  99904: 20
LYMPHOCYTES # BLD AUTO: 0.29 K/UL (ref 1–4.8)
LYMPHOCYTES # BLD AUTO: 0.51 K/UL (ref 1–4.8)
LYMPHOCYTES # BLD AUTO: 0.66 K/UL (ref 1–4.8)
LYMPHOCYTES # BLD AUTO: 1.09 K/UL (ref 1–4.8)
LYMPHOCYTES # BLD AUTO: 1.1 K/UL (ref 1–4.8)
LYMPHOCYTES NFR BLD: 13.3 % (ref 22–41)
LYMPHOCYTES NFR BLD: 16.4 % (ref 22–41)
LYMPHOCYTES NFR BLD: 3.2 % (ref 22–41)
LYMPHOCYTES NFR BLD: 4.3 % (ref 22–41)
LYMPHOCYTES NFR BLD: 4.6 % (ref 22–41)
MAGNESIUM SERPL-MCNC: 2.6 MG/DL (ref 1.5–2.5)
MAGNESIUM SERPL-MCNC: 2.8 MG/DL (ref 1.5–2.5)
MANUAL DIFF BLD: NORMAL
MCH RBC QN AUTO: 29.1 PG (ref 27–33)
MCH RBC QN AUTO: 29.4 PG (ref 27–33)
MCH RBC QN AUTO: 29.4 PG (ref 27–33)
MCH RBC QN AUTO: 29.6 PG (ref 27–33)
MCH RBC QN AUTO: 29.6 PG (ref 27–33)
MCH RBC QN AUTO: 30.2 PG (ref 27–33)
MCHC RBC AUTO-ENTMCNC: 29.8 G/DL (ref 32.2–35.5)
MCHC RBC AUTO-ENTMCNC: 30.4 G/DL (ref 32.2–35.5)
MCHC RBC AUTO-ENTMCNC: 31.1 G/DL (ref 32.2–35.5)
MCHC RBC AUTO-ENTMCNC: 31.1 G/DL (ref 33.6–35)
MCHC RBC AUTO-ENTMCNC: 34.2 G/DL (ref 32.2–35.5)
MCHC RBC AUTO-ENTMCNC: 34.9 G/DL (ref 32.2–35.5)
MCV RBC AUTO: 86 FL (ref 81.4–97.8)
MCV RBC AUTO: 86.3 FL (ref 81.4–97.8)
MCV RBC AUTO: 95.2 FL (ref 81.4–97.8)
MCV RBC AUTO: 95.2 FL (ref 81.4–97.8)
MCV RBC AUTO: 96.8 FL (ref 81.4–97.8)
MCV RBC AUTO: 97.6 FL (ref 81.4–97.8)
METAMYELOCYTES NFR BLD MANUAL: 0.9 %
MICRO URNS: ABNORMAL
MICROCYTES BLD QL SMEAR: ABNORMAL
MODE IMODE: ABNORMAL
MODE IMODE: ABNORMAL
MONOCYTES # BLD AUTO: 0.23 K/UL (ref 0–0.85)
MONOCYTES # BLD AUTO: 0.47 K/UL (ref 0–0.85)
MONOCYTES # BLD AUTO: 0.72 K/UL (ref 0–0.85)
MONOCYTES # BLD AUTO: 0.88 K/UL (ref 0–0.85)
MONOCYTES # BLD AUTO: 1.07 K/UL (ref 0–0.85)
MONOCYTES NFR BLD AUTO: 2.5 % (ref 0–13.4)
MONOCYTES NFR BLD AUTO: 7 % (ref 0–13.4)
MONOCYTES NFR BLD AUTO: 7.1 % (ref 0–13.4)
MONOCYTES NFR BLD AUTO: 8 % (ref 0–13.4)
MONOCYTES NFR BLD AUTO: 8.7 % (ref 0–13.4)
MORPHOLOGY BLD-IMP: NORMAL
MORPHOLOGY BLD-IMP: NORMAL
NEUTROPHILS # BLD AUTO: 13.43 K/UL (ref 1.82–7.42)
NEUTROPHILS # BLD AUTO: 4.92 K/UL (ref 1.82–7.42)
NEUTROPHILS # BLD AUTO: 6.31 K/UL (ref 2–7.15)
NEUTROPHILS # BLD AUTO: 8.63 K/UL (ref 1.82–7.42)
NEUTROPHILS # BLD AUTO: 9.51 K/UL (ref 1.82–7.42)
NEUTROPHILS NFR BLD: 74.2 % (ref 44–72)
NEUTROPHILS NFR BLD: 75.9 % (ref 44–72)
NEUTROPHILS NFR BLD: 86.6 % (ref 44–72)
NEUTROPHILS NFR BLD: 87.8 % (ref 44–72)
NEUTROPHILS NFR BLD: 93.9 % (ref 44–72)
NEUTS HYPERSEG BLD QL SMEAR: NORMAL
NITRITE UR QL STRIP.AUTO: NEGATIVE
NRBC # BLD AUTO: 0 K/UL
NRBC # BLD AUTO: 0.1 K/UL
NRBC BLD-RTO: 0 /100 WBC
NRBC BLD-RTO: 0 /100 WBC (ref 0–0.2)
NRBC BLD-RTO: 0.7 /100 WBC (ref 0–0.2)
NT-PROBNP SERPL IA-MCNC: 568 PG/ML (ref 0–125)
NT-PROBNP SERPL IA-MCNC: <36 PG/ML (ref 0–125)
O2/TOTAL GAS SETTING VFR VENT: 100 %
O2/TOTAL GAS SETTING VFR VENT: 80 %
OSMOLALITY SERPL: 247 MOSM/KG H2O (ref 278–298)
OSMOLALITY UR: 642 MOSM/KG H2O (ref 300–900)
OVALOCYTES BLD QL SMEAR: NORMAL
PCO2 BLDA: 101.4 MMHG (ref 26–37)
PCO2 BLDA: 60.7 MMHG (ref 26–37)
PCO2 BLDA: 99.2 MMHG (ref 26–37)
PCO2 TEMP ADJ BLDA: 67 MMHG (ref 26–37)
PCO2 TEMP ADJ BLDA: 91.3 MMHG (ref 26–37)
PEEP END EXPIRATORY PRESSURE IPEEP: 10 CMH20
PEEP END EXPIRATORY PRESSURE IPEEP: 10 CMH20
PH BLDA: 7.2 [PH] (ref 7.4–7.5)
PH BLDA: 7.21 [PH] (ref 7.4–7.5)
PH BLDA: 7.34 [PH] (ref 7.4–7.5)
PH TEMP ADJ BLDA: 7.23 [PH] (ref 7.4–7.5)
PH TEMP ADJ BLDA: 7.31 [PH] (ref 7.4–7.5)
PH UR STRIP.AUTO: 5.5 [PH] (ref 5–8)
PHOSPHATE SERPL-MCNC: 5.9 MG/DL (ref 2.5–4.5)
PHOSPHATE SERPL-MCNC: 7.6 MG/DL (ref 2.5–4.5)
PLATELET # BLD AUTO: 166 K/UL (ref 164–446)
PLATELET # BLD AUTO: 173 K/UL (ref 164–446)
PLATELET # BLD AUTO: 177 K/UL (ref 164–446)
PLATELET # BLD AUTO: 188 K/UL (ref 164–446)
PLATELET # BLD AUTO: 204 K/UL (ref 164–446)
PLATELET # BLD AUTO: 214 K/UL (ref 164–446)
PLATELET BLD QL SMEAR: NORMAL
PLATELET BLD QL SMEAR: NORMAL
PMV BLD AUTO: 11.7 FL (ref 9–12.9)
PMV BLD AUTO: 11.7 FL (ref 9–12.9)
PMV BLD AUTO: 12.1 FL (ref 9–12.9)
PMV BLD AUTO: 12.7 FL (ref 9–12.9)
PMV BLD AUTO: 13.1 FL (ref 9–12.9)
PMV BLD AUTO: 13.7 FL (ref 9–12.9)
PO2 BLDA: 108 MMHG (ref 64–87)
PO2 BLDA: 72 MMHG (ref 64–87)
PO2 BLDA: 72.6 MMHG (ref 64–87)
PO2 TEMP ADJ BLDA: 83 MMHG (ref 64–87)
PO2 TEMP ADJ BLDA: 97 MMHG (ref 64–87)
POIKILOCYTOSIS BLD QL SMEAR: NORMAL
POTASSIUM SERPL-SCNC: 3.9 MMOL/L (ref 3.6–5.5)
POTASSIUM SERPL-SCNC: 4 MMOL/L (ref 3.6–5.5)
POTASSIUM SERPL-SCNC: 4.2 MMOL/L (ref 3.6–5.5)
POTASSIUM SERPL-SCNC: 4.2 MMOL/L (ref 3.6–5.5)
POTASSIUM SERPL-SCNC: 4.3 MMOL/L (ref 3.6–5.5)
POTASSIUM SERPL-SCNC: 4.3 MMOL/L (ref 3.6–5.5)
POTASSIUM SERPL-SCNC: 4.4 MMOL/L (ref 3.6–5.5)
POTASSIUM SERPL-SCNC: 4.6 MMOL/L (ref 3.6–5.5)
PROCALCITONIN SERPL-MCNC: 0.3 NG/ML
PROCALCITONIN SERPL-MCNC: 17.5 NG/ML
PROT SERPL-MCNC: 5.4 G/DL (ref 6–8.2)
PROT SERPL-MCNC: 5.9 G/DL (ref 6–8.2)
PROT SERPL-MCNC: 6.4 G/DL (ref 6–8.2)
PROT SERPL-MCNC: 6.7 G/DL (ref 6–8.2)
PROT SERPL-MCNC: 6.8 G/DL (ref 6–8.2)
PROT SERPL-MCNC: 7.1 G/DL (ref 6–8.2)
PROT SERPL-MCNC: 7.3 G/DL (ref 6–8.2)
PROT UR QL STRIP: >=300 MG/DL
PROTHROMBIN TIME: 15.4 SEC (ref 12–14.6)
PROTHROMBIN TIME: 15.5 SEC (ref 12–14.6)
RBC # BLD AUTO: 3.88 M/UL (ref 4.2–5.4)
RBC # BLD AUTO: 3.99 M/UL (ref 4.2–5.4)
RBC # BLD AUTO: 4.01 M/UL (ref 4.2–5.4)
RBC # BLD AUTO: 4.08 M/UL (ref 4.2–5.4)
RBC # BLD AUTO: 4.13 M/UL (ref 4.2–5.4)
RBC # BLD AUTO: 4.15 M/UL (ref 4.2–5.4)
RBC # URNS HPF: NORMAL /HPF
RBC BLD AUTO: NORMAL
RBC BLD AUTO: PRESENT
RBC UR QL AUTO: ABNORMAL
RSV RNA SPEC QL NAA+PROBE: NEGATIVE
SAO2 % BLDA: 87 % (ref 93–99)
SAO2 % BLDA: 93 % (ref 93–99)
SAO2 % BLDA: 96 % (ref 93–99)
SARS-COV-2 RNA RESP QL NAA+PROBE: NOTDETECTED
SIGNIFICANT IND 70042: NORMAL
SITE SITE: NORMAL
SODIUM SERPL-SCNC: 117 MMOL/L (ref 135–145)
SODIUM SERPL-SCNC: 118 MMOL/L (ref 135–145)
SODIUM SERPL-SCNC: 121 MMOL/L (ref 135–145)
SODIUM SERPL-SCNC: 123 MMOL/L (ref 135–145)
SODIUM SERPL-SCNC: 124 MMOL/L (ref 135–145)
SODIUM SERPL-SCNC: 125 MMOL/L (ref 135–145)
SODIUM SERPL-SCNC: 128 MMOL/L (ref 135–145)
SODIUM SERPL-SCNC: 129 MMOL/L (ref 135–145)
SODIUM SERPL-SCNC: 129 MMOL/L (ref 135–145)
SODIUM SERPL-SCNC: 135 MMOL/L (ref 135–145)
SODIUM SERPL-SCNC: 136 MMOL/L (ref 135–145)
SODIUM SERPL-SCNC: 142 MMOL/L (ref 135–145)
SODIUM SERPL-SCNC: 143 MMOL/L (ref 135–145)
SODIUM SERPL-SCNC: 145 MMOL/L (ref 135–145)
SODIUM UR-SCNC: 46 MMOL/L
SOURCE SOURCE: NORMAL
SP GR UR STRIP.AUTO: >=1.03
SPECIMEN DRAWN FROM PATIENT: ABNORMAL
SPECIMEN DRAWN FROM PATIENT: ABNORMAL
SPECIMEN SOURCE: NORMAL
STOMATOCYTES BLD QL SMEAR: NORMAL
T3 SERPL-MCNC: 102 NG/DL (ref 60–181)
T4 FREE SERPL-MCNC: 1.33 NG/DL (ref 0.93–1.7)
TIBC SERPL-MCNC: 323 UG/DL (ref 250–450)
TIDAL VOLUME IVT: 320 ML
TIDAL VOLUME IVT: 320 ML
TRIGL SERPL-MCNC: 114 MG/DL (ref 0–149)
TROPONIN T SERPL-MCNC: 18 NG/L (ref 6–19)
TROPONIN T SERPL-MCNC: 34 NG/L (ref 6–19)
TROPONIN T SERPL-MCNC: 37 NG/L (ref 6–19)
TROPONIN T SERPL-MCNC: 77 NG/L (ref 6–19)
TROPONIN T SERPL-MCNC: 97 NG/L (ref 6–19)
TSH SERPL DL<=0.005 MIU/L-ACNC: 2.77 UIU/ML (ref 0.38–5.33)
TSH SERPL DL<=0.005 MIU/L-ACNC: 3.29 UIU/ML (ref 0.38–5.33)
TSH SERPL DL<=0.005 MIU/L-ACNC: 4.59 UIU/ML (ref 0.38–5.33)
UFH PPP CHRO-ACNC: 0.66 IU/ML
UFH PPP CHRO-ACNC: 0.71 IU/ML
UFH PPP CHRO-ACNC: <0.1 IU/ML
UIBC SERPL-MCNC: 258 UG/DL (ref 110–370)
UROBILINOGEN UR STRIP.AUTO-MCNC: 4 MG/DL
VIT B12 SERPL-MCNC: 1197 PG/ML (ref 211–911)
WBC # BLD AUTO: 11 K/UL (ref 4.8–10.8)
WBC # BLD AUTO: 15.3 K/UL (ref 4.8–10.8)
WBC # BLD AUTO: 16.8 K/UL (ref 4.8–10.8)
WBC # BLD AUTO: 6.6 K/UL (ref 4.8–10.8)
WBC # BLD AUTO: 8.3 K/UL (ref 4.8–10.8)
WBC # BLD AUTO: 9.2 K/UL (ref 4.8–10.8)
WBC #/AREA URNS HPF: NORMAL /HPF

## 2023-01-01 PROCEDURE — 94003 VENT MGMT INPAT SUBQ DAY: CPT

## 2023-01-01 PROCEDURE — 80053 COMPREHEN METABOLIC PANEL: CPT | Mod: 91

## 2023-01-01 PROCEDURE — 94669 MECHANICAL CHEST WALL OSCILL: CPT

## 2023-01-01 PROCEDURE — 700111 HCHG RX REV CODE 636 W/ 250 OVERRIDE (IP): Performed by: STUDENT IN AN ORGANIZED HEALTH CARE EDUCATION/TRAINING PROGRAM

## 2023-01-01 PROCEDURE — 99285 EMERGENCY DEPT VISIT HI MDM: CPT

## 2023-01-01 PROCEDURE — 700101 HCHG RX REV CODE 250: Performed by: STUDENT IN AN ORGANIZED HEALTH CARE EDUCATION/TRAINING PROGRAM

## 2023-01-01 PROCEDURE — 36415 COLL VENOUS BLD VENIPUNCTURE: CPT

## 2023-01-01 PROCEDURE — 94640 AIRWAY INHALATION TREATMENT: CPT

## 2023-01-01 PROCEDURE — 700111 HCHG RX REV CODE 636 W/ 250 OVERRIDE (IP): Mod: JZ | Performed by: STUDENT IN AN ORGANIZED HEALTH CARE EDUCATION/TRAINING PROGRAM

## 2023-01-01 PROCEDURE — 3078F DIAST BP <80 MM HG: CPT | Performed by: NURSE PRACTITIONER

## 2023-01-01 PROCEDURE — 85730 THROMBOPLASTIN TIME PARTIAL: CPT

## 2023-01-01 PROCEDURE — 3074F SYST BP LT 130 MM HG: CPT | Performed by: NURSE PRACTITIONER

## 2023-01-01 PROCEDURE — 83930 ASSAY OF BLOOD OSMOLALITY: CPT

## 2023-01-01 PROCEDURE — 92950 HEART/LUNG RESUSCITATION CPR: CPT

## 2023-01-01 PROCEDURE — 84443 ASSAY THYROID STIM HORMONE: CPT

## 2023-01-01 PROCEDURE — 93005 ELECTROCARDIOGRAM TRACING: CPT

## 2023-01-01 PROCEDURE — 88305 TISSUE EXAM BY PATHOLOGIST: CPT

## 2023-01-01 PROCEDURE — 0B9M8ZX DRAINAGE OF BILATERAL LUNGS, VIA NATURAL OR ARTIFICIAL OPENING ENDOSCOPIC, DIAGNOSTIC: ICD-10-PCS | Performed by: INTERNAL MEDICINE

## 2023-01-01 PROCEDURE — 84145 PROCALCITONIN (PCT): CPT

## 2023-01-01 PROCEDURE — 700101 HCHG RX REV CODE 250: Performed by: INTERNAL MEDICINE

## 2023-01-01 PROCEDURE — 94002 VENT MGMT INPAT INIT DAY: CPT

## 2023-01-01 PROCEDURE — 87070 CULTURE OTHR SPECIMN AEROBIC: CPT

## 2023-01-01 PROCEDURE — 93010 ELECTROCARDIOGRAM REPORT: CPT | Performed by: INTERNAL MEDICINE

## 2023-01-01 PROCEDURE — 36556 INSERT NON-TUNNEL CV CATH: CPT | Performed by: INTERNAL MEDICINE

## 2023-01-01 PROCEDURE — A9270 NON-COVERED ITEM OR SERVICE: HCPCS | Performed by: INTERNAL MEDICINE

## 2023-01-01 PROCEDURE — A9270 NON-COVERED ITEM OR SERVICE: HCPCS | Mod: JZ | Performed by: HOSPITALIST

## 2023-01-01 PROCEDURE — 94664 DEMO&/EVAL PT USE INHALER: CPT

## 2023-01-01 PROCEDURE — 93005 ELECTROCARDIOGRAM TRACING: CPT | Performed by: STUDENT IN AN ORGANIZED HEALTH CARE EDUCATION/TRAINING PROGRAM

## 2023-01-01 PROCEDURE — 700111 HCHG RX REV CODE 636 W/ 250 OVERRIDE (IP): Mod: JZ | Performed by: HOSPITALIST

## 2023-01-01 PROCEDURE — 700111 HCHG RX REV CODE 636 W/ 250 OVERRIDE (IP)

## 2023-01-01 PROCEDURE — 302978 HCHG BRONCHOSCOPY-DIAGNOSTIC

## 2023-01-01 PROCEDURE — 85025 COMPLETE CBC W/AUTO DIFF WBC: CPT

## 2023-01-01 PROCEDURE — 85520 HEPARIN ASSAY: CPT

## 2023-01-01 PROCEDURE — 0BH17EZ INSERTION OF ENDOTRACHEAL AIRWAY INTO TRACHEA, VIA NATURAL OR ARTIFICIAL OPENING: ICD-10-PCS | Performed by: STUDENT IN AN ORGANIZED HEALTH CARE EDUCATION/TRAINING PROGRAM

## 2023-01-01 PROCEDURE — 5A2204Z RESTORATION OF CARDIAC RHYTHM, SINGLE: ICD-10-PCS | Performed by: INTERNAL MEDICINE

## 2023-01-01 PROCEDURE — 97162 PT EVAL MOD COMPLEX 30 MIN: CPT

## 2023-01-01 PROCEDURE — 700111 HCHG RX REV CODE 636 W/ 250 OVERRIDE (IP): Performed by: INTERNAL MEDICINE

## 2023-01-01 PROCEDURE — 700102 HCHG RX REV CODE 250 W/ 637 OVERRIDE(OP): Mod: JZ | Performed by: HOSPITALIST

## 2023-01-01 PROCEDURE — 770000 HCHG ROOM/CARE - INTERMEDIATE ICU *

## 2023-01-01 PROCEDURE — C1751 CATH, INF, PER/CENT/MIDLINE: HCPCS

## 2023-01-01 PROCEDURE — 83550 IRON BINDING TEST: CPT

## 2023-01-01 PROCEDURE — 700117 HCHG RX CONTRAST REV CODE 255: Performed by: STUDENT IN AN ORGANIZED HEALTH CARE EDUCATION/TRAINING PROGRAM

## 2023-01-01 PROCEDURE — 31624 DX BRONCHOSCOPE/LAVAGE: CPT | Performed by: INTERNAL MEDICINE

## 2023-01-01 PROCEDURE — 700102 HCHG RX REV CODE 250 W/ 637 OVERRIDE(OP)

## 2023-01-01 PROCEDURE — 700102 HCHG RX REV CODE 250 W/ 637 OVERRIDE(OP): Performed by: EMERGENCY MEDICINE

## 2023-01-01 PROCEDURE — 700105 HCHG RX REV CODE 258: Performed by: INTERNAL MEDICINE

## 2023-01-01 PROCEDURE — 83690 ASSAY OF LIPASE: CPT

## 2023-01-01 PROCEDURE — 83036 HEMOGLOBIN GLYCOSYLATED A1C: CPT | Mod: GA

## 2023-01-01 PROCEDURE — 700105 HCHG RX REV CODE 258: Performed by: STUDENT IN AN ORGANIZED HEALTH CARE EDUCATION/TRAINING PROGRAM

## 2023-01-01 PROCEDURE — 71045 X-RAY EXAM CHEST 1 VIEW: CPT

## 2023-01-01 PROCEDURE — 96365 THER/PROPH/DIAG IV INF INIT: CPT

## 2023-01-01 PROCEDURE — 770020 HCHG ROOM/CARE - TELE (206)

## 2023-01-01 PROCEDURE — 82607 VITAMIN B-12: CPT

## 2023-01-01 PROCEDURE — 87205 SMEAR GRAM STAIN: CPT

## 2023-01-01 PROCEDURE — 85007 BL SMEAR W/DIFF WBC COUNT: CPT

## 2023-01-01 PROCEDURE — 99233 SBSQ HOSP IP/OBS HIGH 50: CPT | Performed by: HOSPITALIST

## 2023-01-01 PROCEDURE — 80048 BASIC METABOLIC PNL TOTAL CA: CPT

## 2023-01-01 PROCEDURE — 83880 ASSAY OF NATRIURETIC PEPTIDE: CPT

## 2023-01-01 PROCEDURE — 84295 ASSAY OF SERUM SODIUM: CPT

## 2023-01-01 PROCEDURE — 51798 US URINE CAPACITY MEASURE: CPT

## 2023-01-01 PROCEDURE — 82728 ASSAY OF FERRITIN: CPT

## 2023-01-01 PROCEDURE — 85379 FIBRIN DEGRADATION QUANT: CPT

## 2023-01-01 PROCEDURE — 82140 ASSAY OF AMMONIA: CPT

## 2023-01-01 PROCEDURE — 84295 ASSAY OF SERUM SODIUM: CPT | Mod: 91

## 2023-01-01 PROCEDURE — 96375 TX/PRO/DX INJ NEW DRUG ADDON: CPT

## 2023-01-01 PROCEDURE — 0241U HCHG SARS-COV-2 COVID-19 NFCT DS RESP RNA 4 TRGT MIC: CPT

## 2023-01-01 PROCEDURE — 82746 ASSAY OF FOLIC ACID SERUM: CPT

## 2023-01-01 PROCEDURE — 700102 HCHG RX REV CODE 250 W/ 637 OVERRIDE(OP): Performed by: INTERNAL MEDICINE

## 2023-01-01 PROCEDURE — 700111 HCHG RX REV CODE 636 W/ 250 OVERRIDE (IP): Mod: JZ | Performed by: INTERNAL MEDICINE

## 2023-01-01 PROCEDURE — 84484 ASSAY OF TROPONIN QUANT: CPT

## 2023-01-01 PROCEDURE — 83605 ASSAY OF LACTIC ACID: CPT

## 2023-01-01 PROCEDURE — 31645 BRNCHSC W/THER ASPIR 1ST: CPT | Performed by: INTERNAL MEDICINE

## 2023-01-01 PROCEDURE — 99223 1ST HOSP IP/OBS HIGH 75: CPT | Mod: AI | Performed by: INTERNAL MEDICINE

## 2023-01-01 PROCEDURE — 82105 ALPHA-FETOPROTEIN SERUM: CPT

## 2023-01-01 PROCEDURE — 80053 COMPREHEN METABOLIC PANEL: CPT

## 2023-01-01 PROCEDURE — 92950 HEART/LUNG RESUSCITATION CPR: CPT | Performed by: INTERNAL MEDICINE

## 2023-01-01 PROCEDURE — 31500 INSERT EMERGENCY AIRWAY: CPT

## 2023-01-01 PROCEDURE — 93306 TTE W/DOPPLER COMPLETE: CPT

## 2023-01-01 PROCEDURE — 36556 INSERT NON-TUNNEL CV CATH: CPT

## 2023-01-01 PROCEDURE — 81001 URINALYSIS AUTO W/SCOPE: CPT

## 2023-01-01 PROCEDURE — C9803 HOPD COVID-19 SPEC COLLECT: HCPCS | Performed by: STUDENT IN AN ORGANIZED HEALTH CARE EDUCATION/TRAINING PROGRAM

## 2023-01-01 PROCEDURE — 99239 HOSP IP/OBS DSCHRG MGMT >30: CPT | Performed by: STUDENT IN AN ORGANIZED HEALTH CARE EDUCATION/TRAINING PROGRAM

## 2023-01-01 PROCEDURE — A9270 NON-COVERED ITEM OR SERVICE: HCPCS | Performed by: EMERGENCY MEDICINE

## 2023-01-01 PROCEDURE — 87040 BLOOD CULTURE FOR BACTERIA: CPT

## 2023-01-01 PROCEDURE — 83935 ASSAY OF URINE OSMOLALITY: CPT

## 2023-01-01 PROCEDURE — 85610 PROTHROMBIN TIME: CPT

## 2023-01-01 PROCEDURE — 700102 HCHG RX REV CODE 250 W/ 637 OVERRIDE(OP): Performed by: STUDENT IN AN ORGANIZED HEALTH CARE EDUCATION/TRAINING PROGRAM

## 2023-01-01 PROCEDURE — 99496 TRANSJ CARE MGMT HIGH F2F 7D: CPT | Mod: 95 | Performed by: FAMILY MEDICINE

## 2023-01-01 PROCEDURE — 83605 ASSAY OF LACTIC ACID: CPT | Mod: 91

## 2023-01-01 PROCEDURE — 5A12012 PERFORMANCE OF CARDIAC OUTPUT, SINGLE, MANUAL: ICD-10-PCS | Performed by: STUDENT IN AN ORGANIZED HEALTH CARE EDUCATION/TRAINING PROGRAM

## 2023-01-01 PROCEDURE — 99292 CRITICAL CARE ADDL 30 MIN: CPT | Performed by: EMERGENCY MEDICINE

## 2023-01-01 PROCEDURE — 99291 CRITICAL CARE FIRST HOUR: CPT | Performed by: HOSPITALIST

## 2023-01-01 PROCEDURE — A9270 NON-COVERED ITEM OR SERVICE: HCPCS | Performed by: STUDENT IN AN ORGANIZED HEALTH CARE EDUCATION/TRAINING PROGRAM

## 2023-01-01 PROCEDURE — 99291 CRITICAL CARE FIRST HOUR: CPT | Mod: 25 | Performed by: INTERNAL MEDICINE

## 2023-01-01 PROCEDURE — 99292 CRITICAL CARE ADDL 30 MIN: CPT | Mod: 25 | Performed by: INTERNAL MEDICINE

## 2023-01-01 PROCEDURE — 770006 HCHG ROOM/CARE - MED/SURG/GYN SEMI*

## 2023-01-01 PROCEDURE — 82962 GLUCOSE BLOOD TEST: CPT

## 2023-01-01 PROCEDURE — 80061 LIPID PANEL: CPT

## 2023-01-01 PROCEDURE — 94799 UNLISTED PULMONARY SVC/PX: CPT

## 2023-01-01 PROCEDURE — 99152 MOD SED SAME PHYS/QHP 5/>YRS: CPT

## 2023-01-01 PROCEDURE — 84439 ASSAY OF FREE THYROXINE: CPT

## 2023-01-01 PROCEDURE — 36620 INSERTION CATHETER ARTERY: CPT | Performed by: INTERNAL MEDICINE

## 2023-01-01 PROCEDURE — 99223 1ST HOSP IP/OBS HIGH 75: CPT | Mod: AI | Performed by: STUDENT IN AN ORGANIZED HEALTH CARE EDUCATION/TRAINING PROGRAM

## 2023-01-01 PROCEDURE — 70450 CT HEAD/BRAIN W/O DYE: CPT

## 2023-01-01 PROCEDURE — 700105 HCHG RX REV CODE 258: Performed by: HOSPITALIST

## 2023-01-01 PROCEDURE — 76705 ECHO EXAM OF ABDOMEN: CPT

## 2023-01-01 PROCEDURE — 85027 COMPLETE CBC AUTOMATED: CPT

## 2023-01-01 PROCEDURE — 36600 WITHDRAWAL OF ARTERIAL BLOOD: CPT

## 2023-01-01 PROCEDURE — 99214 OFFICE O/P EST MOD 30 MIN: CPT | Performed by: NURSE PRACTITIONER

## 2023-01-01 PROCEDURE — 05HY33Z INSERTION OF INFUSION DEVICE INTO UPPER VEIN, PERCUTANEOUS APPROACH: ICD-10-PCS | Performed by: INTERNAL MEDICINE

## 2023-01-01 PROCEDURE — 84100 ASSAY OF PHOSPHORUS: CPT | Mod: 91

## 2023-01-01 PROCEDURE — G0439 PPPS, SUBSEQ VISIT: HCPCS | Performed by: NURSE PRACTITIONER

## 2023-01-01 PROCEDURE — 83735 ASSAY OF MAGNESIUM: CPT | Mod: 91

## 2023-01-01 PROCEDURE — 82306 VITAMIN D 25 HYDROXY: CPT

## 2023-01-01 PROCEDURE — 03HY32Z INSERTION OF MONITORING DEVICE INTO UPPER ARTERY, PERCUTANEOUS APPROACH: ICD-10-PCS | Performed by: INTERNAL MEDICINE

## 2023-01-01 PROCEDURE — 96376 TX/PRO/DX INJ SAME DRUG ADON: CPT

## 2023-01-01 PROCEDURE — 97535 SELF CARE MNGMENT TRAINING: CPT

## 2023-01-01 PROCEDURE — A9270 NON-COVERED ITEM OR SERVICE: HCPCS

## 2023-01-01 PROCEDURE — 5A1935Z RESPIRATORY VENTILATION, LESS THAN 24 CONSECUTIVE HOURS: ICD-10-PCS | Performed by: STUDENT IN AN ORGANIZED HEALTH CARE EDUCATION/TRAINING PROGRAM

## 2023-01-01 PROCEDURE — 82533 TOTAL CORTISOL: CPT

## 2023-01-01 PROCEDURE — 93306 TTE W/DOPPLER COMPLETE: CPT | Mod: 26 | Performed by: INTERNAL MEDICINE

## 2023-01-01 PROCEDURE — 83540 ASSAY OF IRON: CPT

## 2023-01-01 PROCEDURE — 700105 HCHG RX REV CODE 258: Performed by: EMERGENCY MEDICINE

## 2023-01-01 PROCEDURE — 84300 ASSAY OF URINE SODIUM: CPT

## 2023-01-01 PROCEDURE — RXMED WILLOW AMBULATORY MEDICATION CHARGE: Performed by: STUDENT IN AN ORGANIZED HEALTH CARE EDUCATION/TRAINING PROGRAM

## 2023-01-01 PROCEDURE — 84480 ASSAY TRIIODOTHYRONINE (T3): CPT

## 2023-01-01 PROCEDURE — 88112 CYTOPATH CELL ENHANCE TECH: CPT

## 2023-01-01 PROCEDURE — 80074 ACUTE HEPATITIS PANEL: CPT

## 2023-01-01 PROCEDURE — 82803 BLOOD GASES ANY COMBINATION: CPT | Mod: 91

## 2023-01-01 PROCEDURE — 36620 INSERTION CATHETER ARTERY: CPT

## 2023-01-01 PROCEDURE — 700111 HCHG RX REV CODE 636 W/ 250 OVERRIDE (IP): Performed by: EMERGENCY MEDICINE

## 2023-01-01 PROCEDURE — 700111 HCHG RX REV CODE 636 W/ 250 OVERRIDE (IP): Mod: JZ

## 2023-01-01 RX ORDER — SODIUM CHLORIDE 9 MG/ML
1000 INJECTION, SOLUTION INTRAVENOUS ONCE
Status: COMPLETED | OUTPATIENT
Start: 2023-01-01 | End: 2023-01-01

## 2023-01-01 RX ORDER — IPRATROPIUM BROMIDE AND ALBUTEROL SULFATE 2.5; .5 MG/3ML; MG/3ML
3 SOLUTION RESPIRATORY (INHALATION)
Status: DISCONTINUED | OUTPATIENT
Start: 2023-01-01 | End: 2023-01-01

## 2023-01-01 RX ORDER — AMLODIPINE BESYLATE 5 MG/1
5 TABLET ORAL DAILY
Qty: 90 TABLET | Refills: 3 | Status: SHIPPED | OUTPATIENT
Start: 2023-01-01 | End: 2023-01-01 | Stop reason: SDUPTHER

## 2023-01-01 RX ORDER — LEVOTHYROXINE SODIUM 0.03 MG/1
50 TABLET ORAL
Status: DISCONTINUED | OUTPATIENT
Start: 2023-01-01 | End: 2023-01-01

## 2023-01-01 RX ORDER — ALBUTEROL SULFATE 90 UG/1
2 AEROSOL, METERED RESPIRATORY (INHALATION) EVERY 6 HOURS PRN
Qty: 1 EACH | Refills: 2 | Status: SHIPPED | OUTPATIENT
Start: 2023-01-01 | End: 2023-12-11

## 2023-01-01 RX ORDER — AZITHROMYCIN 500 MG/5ML
500 INJECTION, POWDER, LYOPHILIZED, FOR SOLUTION INTRAVENOUS ONCE
Status: COMPLETED | OUTPATIENT
Start: 2023-01-01 | End: 2023-01-01

## 2023-01-01 RX ORDER — LISINOPRIL 2.5 MG/1
2.5 TABLET ORAL
Status: DISCONTINUED | OUTPATIENT
Start: 2023-01-01 | End: 2023-01-01

## 2023-01-01 RX ORDER — LEVOTHYROXINE SODIUM 0.05 MG/1
50 TABLET ORAL
Qty: 90 TABLET | Refills: 0 | Status: SHIPPED | OUTPATIENT
Start: 2023-01-01 | End: 2023-12-11

## 2023-01-01 RX ORDER — MIDAZOLAM HYDROCHLORIDE 1 MG/ML
INJECTION INTRAMUSCULAR; INTRAVENOUS
Status: COMPLETED
Start: 2023-01-01 | End: 2023-01-01

## 2023-01-01 RX ORDER — POLYETHYLENE GLYCOL 3350 17 G/17G
1 POWDER, FOR SOLUTION ORAL
Status: DISCONTINUED | OUTPATIENT
Start: 2023-01-01 | End: 2023-01-01 | Stop reason: HOSPADM

## 2023-01-01 RX ORDER — METHYLPREDNISOLONE SODIUM SUCCINATE 125 MG/2ML
125 INJECTION, POWDER, LYOPHILIZED, FOR SOLUTION INTRAMUSCULAR; INTRAVENOUS ONCE
Status: DISCONTINUED | OUTPATIENT
Start: 2023-01-01 | End: 2023-01-01

## 2023-01-01 RX ORDER — ATORVASTATIN CALCIUM 40 MG/1
40 TABLET, FILM COATED ORAL DAILY
Qty: 90 TABLET | Refills: 3 | Status: SHIPPED | OUTPATIENT
Start: 2023-01-01 | End: 2023-01-01 | Stop reason: SDUPTHER

## 2023-01-01 RX ORDER — METOPROLOL TARTRATE 50 MG/1
50 TABLET, FILM COATED ORAL ONCE
Status: COMPLETED | OUTPATIENT
Start: 2023-01-01 | End: 2023-01-01

## 2023-01-01 RX ORDER — FUROSEMIDE 10 MG/ML
40 INJECTION INTRAMUSCULAR; INTRAVENOUS
Status: DISCONTINUED | OUTPATIENT
Start: 2023-01-01 | End: 2023-01-01

## 2023-01-01 RX ORDER — ALBUTEROL SULFATE 90 UG/1
2 AEROSOL, METERED RESPIRATORY (INHALATION) EVERY 6 HOURS PRN
Qty: 1 EACH | Refills: 11 | Status: SHIPPED | OUTPATIENT
Start: 2023-01-01 | End: 2023-01-01 | Stop reason: SDUPTHER

## 2023-01-01 RX ORDER — DEXMEDETOMIDINE HYDROCHLORIDE 4 UG/ML
0-1.5 INJECTION, SOLUTION INTRAVENOUS CONTINUOUS
Status: DISCONTINUED | OUTPATIENT
Start: 2023-01-01 | End: 2023-01-01

## 2023-01-01 RX ORDER — LORAZEPAM 2 MG/ML
0.5 INJECTION INTRAMUSCULAR EVERY 6 HOURS PRN
Status: DISCONTINUED | OUTPATIENT
Start: 2023-01-01 | End: 2023-01-01

## 2023-01-01 RX ORDER — LEVOTHYROXINE SODIUM 0.05 MG/1
50 TABLET ORAL
Qty: 90 TABLET | Refills: 3 | Status: SHIPPED | OUTPATIENT
Start: 2023-01-01 | End: 2023-01-01 | Stop reason: SDUPTHER

## 2023-01-01 RX ORDER — ALBUTEROL SULFATE 90 UG/1
2 AEROSOL, METERED RESPIRATORY (INHALATION)
Status: DISCONTINUED | OUTPATIENT
Start: 2023-01-01 | End: 2023-01-01 | Stop reason: HOSPADM

## 2023-01-01 RX ORDER — LEVOTHYROXINE SODIUM 0.05 MG/1
TABLET ORAL
Qty: 90 TABLET | Refills: 3 | Status: CANCELLED | OUTPATIENT
Start: 2023-01-01

## 2023-01-01 RX ORDER — AZITHROMYCIN 250 MG/1
500 TABLET, FILM COATED ORAL DAILY
Status: COMPLETED | OUTPATIENT
Start: 2023-01-01 | End: 2023-01-01

## 2023-01-01 RX ORDER — POTASSIUM CHLORIDE 20 MEQ/1
20 TABLET, EXTENDED RELEASE ORAL 2 TIMES DAILY
Status: DISCONTINUED | OUTPATIENT
Start: 2023-01-01 | End: 2023-01-01 | Stop reason: HOSPADM

## 2023-01-01 RX ORDER — ONDANSETRON 2 MG/ML
4 INJECTION INTRAMUSCULAR; INTRAVENOUS EVERY 4 HOURS PRN
Status: DISCONTINUED | OUTPATIENT
Start: 2023-01-01 | End: 2023-01-01 | Stop reason: HOSPADM

## 2023-01-01 RX ORDER — FUROSEMIDE 10 MG/ML
20 INJECTION INTRAMUSCULAR; INTRAVENOUS
Status: DISCONTINUED | OUTPATIENT
Start: 2023-01-01 | End: 2023-01-01

## 2023-01-01 RX ORDER — HEPARIN SODIUM 1000 [USP'U]/ML
40 INJECTION, SOLUTION INTRAVENOUS; SUBCUTANEOUS PRN
Status: DISCONTINUED | OUTPATIENT
Start: 2023-01-01 | End: 2023-01-01

## 2023-01-01 RX ORDER — AMLODIPINE BESYLATE 5 MG/1
5 TABLET ORAL DAILY
Qty: 90 TABLET | Refills: 0 | Status: SHIPPED | OUTPATIENT
Start: 2023-01-01 | End: 2023-12-11

## 2023-01-01 RX ORDER — ASPIRIN 81 MG/1
81 TABLET ORAL DAILY
Status: DISCONTINUED | OUTPATIENT
Start: 2023-01-01 | End: 2023-01-01 | Stop reason: HOSPADM

## 2023-01-01 RX ORDER — NYSTATIN 100000 [USP'U]/G
POWDER TOPICAL 2 TIMES DAILY
Status: DISCONTINUED | OUTPATIENT
Start: 2023-01-01 | End: 2023-01-01

## 2023-01-01 RX ORDER — LISINOPRIL AND HYDROCHLOROTHIAZIDE 12.5; 1 MG/1; MG/1
1 TABLET ORAL DAILY
Qty: 90 TABLET | Refills: 3 | Status: ON HOLD | OUTPATIENT
Start: 2023-01-01 | End: 2023-01-01

## 2023-01-01 RX ORDER — BISACODYL 10 MG
10 SUPPOSITORY, RECTAL RECTAL
Status: DISCONTINUED | OUTPATIENT
Start: 2023-01-01 | End: 2023-01-01 | Stop reason: HOSPADM

## 2023-01-01 RX ORDER — FAMOTIDINE 20 MG/1
20 TABLET, FILM COATED ORAL EVERY 12 HOURS
Status: DISCONTINUED | OUTPATIENT
Start: 2023-01-01 | End: 2023-01-01

## 2023-01-01 RX ORDER — AZITHROMYCIN 250 MG/1
TABLET, FILM COATED ORAL
Qty: 6 TABLET | Refills: 0 | Status: SHIPPED | OUTPATIENT
Start: 2023-01-01 | End: 2023-12-11

## 2023-01-01 RX ORDER — LEVOTHYROXINE SODIUM 0.1 MG/1
100 TABLET ORAL
Qty: 90 TABLET | Refills: 3 | Status: SHIPPED | OUTPATIENT
Start: 2023-01-01 | End: 2023-01-01

## 2023-01-01 RX ORDER — GUAIFENESIN 200 MG/10ML
10 LIQUID ORAL EVERY 4 HOURS PRN
Status: DISCONTINUED | OUTPATIENT
Start: 2023-01-01 | End: 2023-01-01 | Stop reason: HOSPADM

## 2023-01-01 RX ORDER — ATORVASTATIN CALCIUM 40 MG/1
40 TABLET, FILM COATED ORAL DAILY
Qty: 90 TABLET | Refills: 0 | Status: SHIPPED | OUTPATIENT
Start: 2023-01-01 | End: 2023-12-11

## 2023-01-01 RX ORDER — IPRATROPIUM BROMIDE AND ALBUTEROL SULFATE 2.5; .5 MG/3ML; MG/3ML
3 SOLUTION RESPIRATORY (INHALATION)
Status: DISCONTINUED | OUTPATIENT
Start: 2023-01-01 | End: 2023-01-01 | Stop reason: HOSPADM

## 2023-01-01 RX ORDER — NOREPINEPHRINE BITARTRATE 0.03 MG/ML
0-1 INJECTION, SOLUTION INTRAVENOUS CONTINUOUS
Status: DISCONTINUED | OUTPATIENT
Start: 2023-01-01 | End: 2023-01-01

## 2023-01-01 RX ORDER — PREDNISONE 20 MG/1
60 TABLET ORAL ONCE
Status: COMPLETED | OUTPATIENT
Start: 2023-01-01 | End: 2023-01-01

## 2023-01-01 RX ORDER — LEVOTHYROXINE SODIUM 0.07 MG/1
75 TABLET ORAL
Qty: 90 TABLET | Refills: 3 | Status: SHIPPED | OUTPATIENT
Start: 2023-01-01 | End: 2023-01-01

## 2023-01-01 RX ORDER — METHYLPREDNISOLONE SODIUM SUCCINATE 40 MG/ML
40 INJECTION, POWDER, LYOPHILIZED, FOR SOLUTION INTRAMUSCULAR; INTRAVENOUS EVERY 6 HOURS
Status: DISCONTINUED | OUTPATIENT
Start: 2023-01-01 | End: 2023-01-01

## 2023-01-01 RX ORDER — DOXYCYCLINE 100 MG/1
100 TABLET ORAL ONCE
Status: DISCONTINUED | OUTPATIENT
Start: 2023-01-01 | End: 2023-01-01

## 2023-01-01 RX ORDER — BISACODYL 10 MG
10 SUPPOSITORY, RECTAL RECTAL
Status: DISCONTINUED | OUTPATIENT
Start: 2023-01-01 | End: 2023-01-01

## 2023-01-01 RX ORDER — AMOXICILLIN 250 MG
2 CAPSULE ORAL 2 TIMES DAILY
Status: DISCONTINUED | OUTPATIENT
Start: 2023-01-01 | End: 2023-01-01

## 2023-01-01 RX ORDER — AZITHROMYCIN 250 MG/1
500 TABLET, FILM COATED ORAL ONCE
Status: COMPLETED | OUTPATIENT
Start: 2023-01-01 | End: 2023-01-01

## 2023-01-01 RX ORDER — SENNA AND DOCUSATE SODIUM 50; 8.6 MG/1; MG/1
1 TABLET, FILM COATED ORAL DAILY
Qty: 90 TABLET | Refills: 3 | Status: SHIPPED | OUTPATIENT
Start: 2023-01-01 | End: 2023-12-11

## 2023-01-01 RX ORDER — ALBUTEROL SULFATE 90 UG/1
2 AEROSOL, METERED RESPIRATORY (INHALATION) EVERY 4 HOURS PRN
Qty: 1 EACH | Refills: 11 | Status: CANCELLED | OUTPATIENT
Start: 2023-01-01

## 2023-01-01 RX ORDER — FUROSEMIDE 20 MG/1
20 TABLET ORAL
Status: DISCONTINUED | OUTPATIENT
Start: 2023-01-01 | End: 2023-01-01 | Stop reason: HOSPADM

## 2023-01-01 RX ORDER — AMLODIPINE BESYLATE 5 MG/1
5 TABLET ORAL
Status: DISCONTINUED | OUTPATIENT
Start: 2023-01-01 | End: 2023-01-01 | Stop reason: HOSPADM

## 2023-01-01 RX ORDER — NALOXONE HYDROCHLORIDE 0.4 MG/ML
INJECTION, SOLUTION INTRAMUSCULAR; INTRAVENOUS; SUBCUTANEOUS
Status: ACTIVE
Start: 2023-01-01 | End: 2023-01-01

## 2023-01-01 RX ORDER — OXYCODONE HYDROCHLORIDE 5 MG/1
5 TABLET ORAL
Status: DISCONTINUED | OUTPATIENT
Start: 2023-01-01 | End: 2023-01-01 | Stop reason: HOSPADM

## 2023-01-01 RX ORDER — SODIUM CHLORIDE 9 MG/ML
INJECTION, SOLUTION INTRAVENOUS CONTINUOUS
Status: DISCONTINUED | OUTPATIENT
Start: 2023-01-01 | End: 2023-01-01

## 2023-01-01 RX ORDER — AMOXICILLIN 250 MG
2 CAPSULE ORAL 2 TIMES DAILY
Status: DISCONTINUED | OUTPATIENT
Start: 2023-01-01 | End: 2023-01-01 | Stop reason: HOSPADM

## 2023-01-01 RX ORDER — MORPHINE SULFATE 4 MG/ML
2 INJECTION INTRAVENOUS
Status: DISCONTINUED | OUTPATIENT
Start: 2023-01-01 | End: 2023-01-01 | Stop reason: HOSPADM

## 2023-01-01 RX ORDER — METOPROLOL SUCCINATE 50 MG/1
50 TABLET, EXTENDED RELEASE ORAL
Status: DISCONTINUED | OUTPATIENT
Start: 2023-01-01 | End: 2023-01-01

## 2023-01-01 RX ORDER — ACETAMINOPHEN 500 MG
1000 TABLET ORAL EVERY 6 HOURS PRN
Status: DISCONTINUED | OUTPATIENT
Start: 2023-01-01 | End: 2023-01-01 | Stop reason: HOSPADM

## 2023-01-01 RX ORDER — EPINEPHRINE 0.1 MG/ML
SYRINGE (ML) INJECTION
Status: COMPLETED | OUTPATIENT
Start: 2023-01-01 | End: 2023-01-01

## 2023-01-01 RX ORDER — NYSTATIN 100000 U/G
1 CREAM TOPICAL 2 TIMES DAILY
Qty: 30 G | Refills: 3 | Status: ON HOLD | OUTPATIENT
Start: 2023-01-01 | End: 2023-01-01

## 2023-01-01 RX ORDER — HEPARIN SODIUM 1000 [USP'U]/ML
80 INJECTION, SOLUTION INTRAVENOUS; SUBCUTANEOUS ONCE
Status: COMPLETED | OUTPATIENT
Start: 2023-01-01 | End: 2023-01-01

## 2023-01-01 RX ORDER — MAGNESIUM SULFATE HEPTAHYDRATE 40 MG/ML
2 INJECTION, SOLUTION INTRAVENOUS ONCE
Status: COMPLETED | OUTPATIENT
Start: 2023-01-01 | End: 2023-01-01

## 2023-01-01 RX ORDER — METOPROLOL TARTRATE 1 MG/ML
5 INJECTION, SOLUTION INTRAVENOUS
Status: DISCONTINUED | OUTPATIENT
Start: 2023-01-01 | End: 2023-01-01

## 2023-01-01 RX ORDER — ENOXAPARIN SODIUM 100 MG/ML
40 INJECTION SUBCUTANEOUS DAILY
Status: DISCONTINUED | OUTPATIENT
Start: 2023-01-01 | End: 2023-01-01

## 2023-01-01 RX ORDER — HEPARIN SODIUM 5000 [USP'U]/100ML
0-30 INJECTION, SOLUTION INTRAVENOUS CONTINUOUS
Status: DISCONTINUED | OUTPATIENT
Start: 2023-01-01 | End: 2023-01-01

## 2023-01-01 RX ORDER — LACTULOSE 20 G/30ML
30 SOLUTION ORAL 3 TIMES DAILY
Status: DISCONTINUED | OUTPATIENT
Start: 2023-01-01 | End: 2023-01-01 | Stop reason: HOSPADM

## 2023-01-01 RX ORDER — ENOXAPARIN SODIUM 100 MG/ML
40 INJECTION SUBCUTANEOUS EVERY 12 HOURS
Status: DISCONTINUED | OUTPATIENT
Start: 2023-01-01 | End: 2023-01-01 | Stop reason: HOSPADM

## 2023-01-01 RX ORDER — OXYCODONE HYDROCHLORIDE 5 MG/1
2.5 TABLET ORAL
Status: DISCONTINUED | OUTPATIENT
Start: 2023-01-01 | End: 2023-01-01 | Stop reason: HOSPADM

## 2023-01-01 RX ORDER — ONDANSETRON 4 MG/1
4 TABLET, ORALLY DISINTEGRATING ORAL EVERY 4 HOURS PRN
Status: DISCONTINUED | OUTPATIENT
Start: 2023-01-01 | End: 2023-01-01 | Stop reason: HOSPADM

## 2023-01-01 RX ORDER — PHENYLEPHRINE HCL IN 0.9% NACL 0.5 MG/5ML
200 SYRINGE (ML) INTRAVENOUS
Status: COMPLETED | OUTPATIENT
Start: 2023-01-01 | End: 2023-01-01

## 2023-01-01 RX ORDER — MIDAZOLAM HYDROCHLORIDE 1 MG/ML
3 INJECTION INTRAMUSCULAR; INTRAVENOUS ONCE
Status: COMPLETED | OUTPATIENT
Start: 2023-01-01 | End: 2023-01-01

## 2023-01-01 RX ORDER — POLYETHYLENE GLYCOL 3350 17 G/17G
1 POWDER, FOR SOLUTION ORAL
Status: DISCONTINUED | OUTPATIENT
Start: 2023-01-01 | End: 2023-01-01

## 2023-01-01 RX ORDER — LISINOPRIL AND HYDROCHLOROTHIAZIDE 12.5; 1 MG/1; MG/1
1 TABLET ORAL DAILY
Qty: 90 TABLET | Refills: 3 | Status: SHIPPED | OUTPATIENT
Start: 2023-01-01 | End: 2023-01-01

## 2023-01-01 RX ORDER — LORAZEPAM 2 MG/ML
1 CONCENTRATE ORAL
Status: DISCONTINUED | OUTPATIENT
Start: 2023-01-01 | End: 2023-01-01 | Stop reason: HOSPADM

## 2023-01-01 RX ORDER — LEVALBUTEROL INHALATION SOLUTION 0.63 MG/3ML
0.63 SOLUTION RESPIRATORY (INHALATION)
Status: DISCONTINUED | OUTPATIENT
Start: 2023-01-01 | End: 2023-01-01

## 2023-01-01 RX ORDER — DIAZEPAM 5 MG/ML
10 INJECTION, SOLUTION INTRAMUSCULAR; INTRAVENOUS
Status: DISCONTINUED | OUTPATIENT
Start: 2023-01-01 | End: 2023-01-01 | Stop reason: HOSPADM

## 2023-01-01 RX ORDER — HYDRALAZINE HYDROCHLORIDE 20 MG/ML
10 INJECTION INTRAMUSCULAR; INTRAVENOUS EVERY 4 HOURS PRN
Status: DISCONTINUED | OUTPATIENT
Start: 2023-01-01 | End: 2023-01-01 | Stop reason: HOSPADM

## 2023-01-01 RX ORDER — ACETAMINOPHEN 325 MG/1
650 TABLET ORAL EVERY 6 HOURS PRN
Status: DISCONTINUED | OUTPATIENT
Start: 2023-01-01 | End: 2023-01-01 | Stop reason: HOSPADM

## 2023-01-01 RX ORDER — NALOXONE HYDROCHLORIDE 0.4 MG/ML
0.4 INJECTION, SOLUTION INTRAMUSCULAR; INTRAVENOUS; SUBCUTANEOUS ONCE
Status: COMPLETED | OUTPATIENT
Start: 2023-01-01 | End: 2023-01-01

## 2023-01-01 RX ORDER — LEVOTHYROXINE SODIUM 0.05 MG/1
50 TABLET ORAL
Status: DISCONTINUED | OUTPATIENT
Start: 2023-01-01 | End: 2023-01-01 | Stop reason: HOSPADM

## 2023-01-01 RX ORDER — AMLODIPINE BESYLATE 5 MG/1
5 TABLET ORAL DAILY
Qty: 90 TABLET | Refills: 0 | Status: SHIPPED | OUTPATIENT
Start: 2023-01-01 | End: 2023-01-01 | Stop reason: SDUPTHER

## 2023-01-01 RX ORDER — SUCCINYLCHOLINE CHLORIDE 20 MG/ML
INJECTION INTRAMUSCULAR; INTRAVENOUS
Status: COMPLETED | OUTPATIENT
Start: 2023-01-01 | End: 2023-01-01

## 2023-01-01 RX ORDER — AMLODIPINE BESYLATE 5 MG/1
5 TABLET ORAL DAILY
Qty: 30 TABLET | Refills: 1 | Status: SHIPPED | OUTPATIENT
Start: 2023-01-01 | End: 2023-01-01 | Stop reason: SDUPTHER

## 2023-01-01 RX ORDER — ASPIRIN 81 MG/1
81 TABLET ORAL DAILY
Status: DISCONTINUED | OUTPATIENT
Start: 2023-01-01 | End: 2023-01-01

## 2023-01-01 RX ORDER — AZITHROMYCIN 250 MG/1
TABLET, FILM COATED ORAL
Qty: 6 TABLET | Refills: 0 | Status: CANCELLED | OUTPATIENT
Start: 2023-01-01

## 2023-01-01 RX ORDER — PREDNISONE 20 MG/1
40 TABLET ORAL DAILY
Status: DISCONTINUED | OUTPATIENT
Start: 2023-01-01 | End: 2023-01-01

## 2023-01-01 RX ORDER — ATORVASTATIN CALCIUM 40 MG/1
40 TABLET, FILM COATED ORAL EVERY EVENING
Status: DISCONTINUED | OUTPATIENT
Start: 2023-01-01 | End: 2023-01-01 | Stop reason: HOSPADM

## 2023-01-01 RX ORDER — ATORVASTATIN CALCIUM 40 MG/1
40 TABLET, FILM COATED ORAL DAILY
Qty: 90 TABLET | Refills: 3 | Status: ON HOLD | OUTPATIENT
Start: 2023-01-01 | End: 2023-01-01

## 2023-01-01 RX ADMIN — AMLODIPINE BESYLATE 5 MG: 10 TABLET ORAL at 05:55

## 2023-01-01 RX ADMIN — NOREPINEPHRINE BITARTRATE 0.5 MCG/KG/MIN: 1 INJECTION, SOLUTION, CONCENTRATE INTRAVENOUS at 03:55

## 2023-01-01 RX ADMIN — FAMOTIDINE 20 MG: 20 TABLET ORAL at 05:55

## 2023-01-01 RX ADMIN — ASPIRIN 81 MG: 81 TABLET, COATED ORAL at 05:41

## 2023-01-01 RX ADMIN — ENOXAPARIN SODIUM 40 MG: 100 INJECTION SUBCUTANEOUS at 05:54

## 2023-01-01 RX ADMIN — LEVOTHYROXINE SODIUM 50 MCG: 0.05 TABLET ORAL at 05:42

## 2023-01-01 RX ADMIN — EPINEPHRINE 1 MG: 0.1 INJECTION, SOLUTION INTRAVENOUS at 02:31

## 2023-01-01 RX ADMIN — MAGNESIUM SULFATE HEPTAHYDRATE 2 G: 2 INJECTION, SOLUTION INTRAVENOUS at 08:03

## 2023-01-01 RX ADMIN — HEPARIN SODIUM 6600 UNITS: 1000 INJECTION, SOLUTION INTRAVENOUS; SUBCUTANEOUS at 04:24

## 2023-01-01 RX ADMIN — LEVOTHYROXINE SODIUM 50 MCG: 0.05 TABLET ORAL at 05:52

## 2023-01-01 RX ADMIN — NYSTATIN: 100000 POWDER TOPICAL at 17:12

## 2023-01-01 RX ADMIN — IPRATROPIUM BROMIDE AND ALBUTEROL SULFATE 3 ML: 2.5; .5 SOLUTION RESPIRATORY (INHALATION) at 21:37

## 2023-01-01 RX ADMIN — DOCUSATE SODIUM 50 MG AND SENNOSIDES 8.6 MG 2 TABLET: 8.6; 5 TABLET, FILM COATED ORAL at 06:11

## 2023-01-01 RX ADMIN — NALOXONE HYDROCHLORIDE 0.4 MG: 0.4 INJECTION, SOLUTION INTRAMUSCULAR; INTRAVENOUS; SUBCUTANEOUS at 01:59

## 2023-01-01 RX ADMIN — SUCCINYLCHOLINE CHLORIDE 120 MG: 20 INJECTION, SOLUTION INTRAMUSCULAR; INTRAVENOUS at 02:40

## 2023-01-01 RX ADMIN — DOCUSATE SODIUM 50 MG AND SENNOSIDES 8.6 MG 2 TABLET: 8.6; 5 TABLET, FILM COATED ORAL at 17:41

## 2023-01-01 RX ADMIN — HUMAN ALBUMIN MICROSPHERES AND PERFLUTREN 3 ML: 10; .22 INJECTION, SOLUTION INTRAVENOUS at 12:45

## 2023-01-01 RX ADMIN — AMPICILLIN AND SULBACTAM 3 G: 1; 2 INJECTION, POWDER, FOR SOLUTION INTRAMUSCULAR; INTRAVENOUS at 17:14

## 2023-01-01 RX ADMIN — MIDAZOLAM HYDROCHLORIDE 3 MG: 1 INJECTION INTRAMUSCULAR; INTRAVENOUS at 03:28

## 2023-01-01 RX ADMIN — METOPROLOL TARTRATE 5 MG: 5 INJECTION INTRAVENOUS at 22:08

## 2023-01-01 RX ADMIN — LACTULOSE 30 ML: 20 SOLUTION ORAL at 17:12

## 2023-01-01 RX ADMIN — FUROSEMIDE 40 MG: 10 INJECTION, SOLUTION INTRAMUSCULAR; INTRAVENOUS at 04:16

## 2023-01-01 RX ADMIN — PREDNISONE 60 MG: 20 TABLET ORAL at 20:44

## 2023-01-01 RX ADMIN — FUROSEMIDE 40 MG: 10 INJECTION, SOLUTION INTRAMUSCULAR; INTRAVENOUS at 15:39

## 2023-01-01 RX ADMIN — VASOPRESSIN 0.03 UNITS/MIN: 20 INJECTION INTRAVENOUS at 08:35

## 2023-01-01 RX ADMIN — NOREPINEPHRINE BITARTRATE 0.62 MCG/KG/MIN: 1 INJECTION, SOLUTION, CONCENTRATE INTRAVENOUS at 11:29

## 2023-01-01 RX ADMIN — SODIUM CHLORIDE: 9 INJECTION, SOLUTION INTRAVENOUS at 00:19

## 2023-01-01 RX ADMIN — METHYLPREDNISOLONE SODIUM SUCCINATE 40 MG: 40 INJECTION, POWDER, FOR SOLUTION INTRAMUSCULAR; INTRAVENOUS at 17:18

## 2023-01-01 RX ADMIN — DEXMEDETOMIDINE HYDROCHLORIDE 1.5 MCG/KG/HR: 100 INJECTION, SOLUTION INTRAVENOUS at 15:01

## 2023-01-01 RX ADMIN — SODIUM CHLORIDE 1000 ML: 9 INJECTION, SOLUTION INTRAVENOUS at 22:11

## 2023-01-01 RX ADMIN — ENOXAPARIN SODIUM 40 MG: 100 INJECTION SUBCUTANEOUS at 18:37

## 2023-01-01 RX ADMIN — HEPARIN SODIUM 18 UNITS/KG/HR: 5000 INJECTION, SOLUTION INTRAVENOUS at 04:28

## 2023-01-01 RX ADMIN — AMLODIPINE BESYLATE 5 MG: 10 TABLET ORAL at 05:42

## 2023-01-01 RX ADMIN — DOCUSATE SODIUM 50 MG AND SENNOSIDES 8.6 MG 2 TABLET: 8.6; 5 TABLET, FILM COATED ORAL at 05:41

## 2023-01-01 RX ADMIN — CEFTRIAXONE SODIUM 1000 MG: 10 INJECTION, POWDER, FOR SOLUTION INTRAVENOUS at 05:32

## 2023-01-01 RX ADMIN — IPRATROPIUM BROMIDE AND ALBUTEROL SULFATE 3 ML: 2.5; .5 SOLUTION RESPIRATORY (INHALATION) at 14:33

## 2023-01-01 RX ADMIN — IPRATROPIUM BROMIDE AND ALBUTEROL SULFATE 3 ML: 2.5; .5 SOLUTION RESPIRATORY (INHALATION) at 05:15

## 2023-01-01 RX ADMIN — SODIUM CHLORIDE 1000 ML: 9 INJECTION, SOLUTION INTRAVENOUS at 22:12

## 2023-01-01 RX ADMIN — AMPICILLIN AND SULBACTAM 3 G: 1; 2 INJECTION, POWDER, FOR SOLUTION INTRAMUSCULAR; INTRAVENOUS at 23:47

## 2023-01-01 RX ADMIN — ENOXAPARIN SODIUM 40 MG: 100 INJECTION SUBCUTANEOUS at 17:41

## 2023-01-01 RX ADMIN — ATORVASTATIN CALCIUM 40 MG: 40 TABLET, FILM COATED ORAL at 18:37

## 2023-01-01 RX ADMIN — DOCUSATE SODIUM 50 MG AND SENNOSIDES 8.6 MG 2 TABLET: 8.6; 5 TABLET, FILM COATED ORAL at 17:12

## 2023-01-01 RX ADMIN — NOREPINEPHRINE BITARTRATE 0.67 MCG/KG/MIN: 1 INJECTION, SOLUTION, CONCENTRATE INTRAVENOUS at 07:41

## 2023-01-01 RX ADMIN — AMIODARONE HYDROCHLORIDE 150 MG: 1.5 INJECTION, SOLUTION INTRAVENOUS at 08:54

## 2023-01-01 RX ADMIN — ASPIRIN 81 MG: 81 TABLET, COATED ORAL at 05:51

## 2023-01-01 RX ADMIN — FUROSEMIDE 20 MG: 20 TABLET ORAL at 05:31

## 2023-01-01 RX ADMIN — DEXMEDETOMIDINE HYDROCHLORIDE 1.5 MCG/KG/HR: 100 INJECTION, SOLUTION INTRAVENOUS at 11:14

## 2023-01-01 RX ADMIN — NOREPINEPHRINE BITARTRATE 0.5 MCG/KG/MIN: 1 INJECTION, SOLUTION, CONCENTRATE INTRAVENOUS at 03:54

## 2023-01-01 RX ADMIN — IPRATROPIUM BROMIDE AND ALBUTEROL SULFATE 3 ML: 2.5; .5 SOLUTION RESPIRATORY (INHALATION) at 07:09

## 2023-01-01 RX ADMIN — AMIODARONE HYDROCHLORIDE 1 MG/MIN: 1.8 INJECTION, SOLUTION INTRAVENOUS at 03:52

## 2023-01-01 RX ADMIN — NYSTATIN: 100000 POWDER TOPICAL at 06:19

## 2023-01-01 RX ADMIN — POTASSIUM CHLORIDE 20 MEQ: 1500 TABLET, EXTENDED RELEASE ORAL at 05:31

## 2023-01-01 RX ADMIN — AMPICILLIN AND SULBACTAM 3 G: 1; 2 INJECTION, POWDER, FOR SOLUTION INTRAMUSCULAR; INTRAVENOUS at 11:27

## 2023-01-01 RX ADMIN — METOPROLOL TARTRATE 50 MG: 50 TABLET, FILM COATED ORAL at 22:59

## 2023-01-01 RX ADMIN — CEFTRIAXONE SODIUM 1000 MG: 10 INJECTION, POWDER, FOR SOLUTION INTRAVENOUS at 06:12

## 2023-01-01 RX ADMIN — ASPIRIN 81 MG: 81 TABLET, COATED ORAL at 06:11

## 2023-01-01 RX ADMIN — DEXMEDETOMIDINE HYDROCHLORIDE 0.2 MCG/KG/HR: 100 INJECTION, SOLUTION INTRAVENOUS at 03:48

## 2023-01-01 RX ADMIN — IPRATROPIUM BROMIDE AND ALBUTEROL SULFATE 3 ML: 2.5; .5 SOLUTION RESPIRATORY (INHALATION) at 12:17

## 2023-01-01 RX ADMIN — LEVOTHYROXINE SODIUM 50 MCG: 0.05 TABLET ORAL at 06:11

## 2023-01-01 RX ADMIN — POTASSIUM CHLORIDE 20 MEQ: 1500 TABLET, EXTENDED RELEASE ORAL at 05:52

## 2023-01-01 RX ADMIN — ATORVASTATIN CALCIUM 40 MG: 40 TABLET, FILM COATED ORAL at 17:41

## 2023-01-01 RX ADMIN — CEFTRIAXONE SODIUM 1000 MG: 10 INJECTION, POWDER, FOR SOLUTION INTRAVENOUS at 05:42

## 2023-01-01 RX ADMIN — DOCUSATE SODIUM 50 MG AND SENNOSIDES 8.6 MG 2 TABLET: 8.6; 5 TABLET, FILM COATED ORAL at 18:13

## 2023-01-01 RX ADMIN — Medication 50 MCG/HR: at 10:22

## 2023-01-01 RX ADMIN — LEVOTHYROXINE SODIUM 50 MCG: 0.05 TABLET ORAL at 05:31

## 2023-01-01 RX ADMIN — DOCUSATE SODIUM 50 MG AND SENNOSIDES 8.6 MG 2 TABLET: 8.6; 5 TABLET, FILM COATED ORAL at 05:52

## 2023-01-01 RX ADMIN — ENOXAPARIN SODIUM 40 MG: 100 INJECTION SUBCUTANEOUS at 05:42

## 2023-01-01 RX ADMIN — Medication 200 MCG: at 02:39

## 2023-01-01 RX ADMIN — METHYLPREDNISOLONE SODIUM SUCCINATE 40 MG: 40 INJECTION, POWDER, FOR SOLUTION INTRAMUSCULAR; INTRAVENOUS at 11:22

## 2023-01-01 RX ADMIN — FUROSEMIDE 40 MG: 10 INJECTION, SOLUTION INTRAVENOUS at 05:43

## 2023-01-01 RX ADMIN — NOREPINEPHRINE BITARTRATE 0.05 MCG/KG/MIN: 1 INJECTION INTRAVENOUS at 02:45

## 2023-01-01 RX ADMIN — GUAIFENESIN 200 MG: 100 SOLUTION ORAL at 20:24

## 2023-01-01 RX ADMIN — AZITHROMYCIN 500 MG: 250 TABLET, FILM COATED ORAL at 18:13

## 2023-01-01 RX ADMIN — FUROSEMIDE 40 MG: 10 INJECTION, SOLUTION INTRAVENOUS at 16:29

## 2023-01-01 RX ADMIN — ENOXAPARIN SODIUM 40 MG: 100 INJECTION SUBCUTANEOUS at 18:14

## 2023-01-01 RX ADMIN — METOPROLOL TARTRATE 5 MG: 5 INJECTION INTRAVENOUS at 22:27

## 2023-01-01 RX ADMIN — FENTANYL CITRATE 50 MCG: 50 INJECTION, SOLUTION INTRAMUSCULAR; INTRAVENOUS at 11:59

## 2023-01-01 RX ADMIN — FENTANYL CITRATE 100 MCG: 50 INJECTION, SOLUTION INTRAMUSCULAR; INTRAVENOUS at 09:35

## 2023-01-01 RX ADMIN — MORPHINE SULFATE 10 MG: 10 INJECTION INTRAVENOUS at 19:18

## 2023-01-01 RX ADMIN — IPRATROPIUM BROMIDE AND ALBUTEROL SULFATE 3 ML: 2.5; .5 SOLUTION RESPIRATORY (INHALATION) at 02:53

## 2023-01-01 RX ADMIN — AZITHROMYCIN DIHYDRATE 500 MG: 250 TABLET, FILM COATED ORAL at 23:43

## 2023-01-01 RX ADMIN — AZITHROMYCIN 500 MG: 500 INJECTION, POWDER, LYOPHILIZED, FOR SOLUTION INTRAVENOUS at 21:58

## 2023-01-01 RX ADMIN — IPRATROPIUM BROMIDE AND ALBUTEROL SULFATE 3 ML: 2.5; .5 SOLUTION RESPIRATORY (INHALATION) at 04:14

## 2023-01-01 RX ADMIN — FUROSEMIDE 40 MG: 10 INJECTION, SOLUTION INTRAVENOUS at 16:21

## 2023-01-01 RX ADMIN — SODIUM CHLORIDE: 9 INJECTION, SOLUTION INTRAVENOUS at 14:22

## 2023-01-01 RX ADMIN — FENTANYL CITRATE 100 MCG: 50 INJECTION, SOLUTION INTRAMUSCULAR; INTRAVENOUS at 04:53

## 2023-01-01 RX ADMIN — CEFTRIAXONE SODIUM 1000 MG: 10 INJECTION, POWDER, FOR SOLUTION INTRAVENOUS at 05:54

## 2023-01-01 RX ADMIN — FUROSEMIDE 40 MG: 10 INJECTION, SOLUTION INTRAVENOUS at 06:03

## 2023-01-01 RX ADMIN — ENOXAPARIN SODIUM 40 MG: 100 INJECTION SUBCUTANEOUS at 05:30

## 2023-01-01 RX ADMIN — POTASSIUM CHLORIDE 20 MEQ: 1500 TABLET, EXTENDED RELEASE ORAL at 18:13

## 2023-01-01 RX ADMIN — METHYLPREDNISOLONE SODIUM SUCCINATE 40 MG: 40 INJECTION, POWDER, FOR SOLUTION INTRAMUSCULAR; INTRAVENOUS at 06:17

## 2023-01-01 RX ADMIN — LACTULOSE 30 ML: 20 SOLUTION ORAL at 05:55

## 2023-01-01 RX ADMIN — AMPICILLIN AND SULBACTAM 3 G: 1; 2 INJECTION, POWDER, FOR SOLUTION INTRAMUSCULAR; INTRAVENOUS at 05:56

## 2023-01-01 RX ADMIN — POTASSIUM CHLORIDE 20 MEQ: 1500 TABLET, EXTENDED RELEASE ORAL at 05:41

## 2023-01-01 RX ADMIN — GUAIFENESIN 200 MG: 100 SOLUTION ORAL at 04:08

## 2023-01-01 RX ADMIN — VASOPRESSIN 0.03 UNITS/MIN: 20 INJECTION INTRAVENOUS at 18:17

## 2023-01-01 RX ADMIN — AZITHROMYCIN 500 MG: 250 TABLET, FILM COATED ORAL at 17:41

## 2023-01-01 RX ADMIN — ASPIRIN 81 MG: 81 TABLET, COATED ORAL at 05:55

## 2023-01-01 RX ADMIN — AMLODIPINE BESYLATE 5 MG: 10 TABLET ORAL at 06:12

## 2023-01-01 RX ADMIN — ENOXAPARIN SODIUM 40 MG: 100 INJECTION SUBCUTANEOUS at 00:20

## 2023-01-01 RX ADMIN — EPINEPHRINE 1 MG: 0.1 INJECTION, SOLUTION INTRAVENOUS at 02:27

## 2023-01-01 RX ADMIN — POTASSIUM CHLORIDE 20 MEQ: 1500 TABLET, EXTENDED RELEASE ORAL at 18:37

## 2023-01-01 RX ADMIN — AMIODARONE HYDROCHLORIDE 0.5 MG/MIN: 1.8 INJECTION, SOLUTION INTRAVENOUS at 09:58

## 2023-01-01 RX ADMIN — LEVOTHYROXINE SODIUM 50 MCG: 25 TABLET ORAL at 05:55

## 2023-01-01 RX ADMIN — ASPIRIN 81 MG: 81 TABLET, COATED ORAL at 05:31

## 2023-01-01 RX ADMIN — FAMOTIDINE 20 MG: 10 INJECTION, SOLUTION INTRAVENOUS at 17:12

## 2023-01-01 RX ADMIN — NOREPINEPHRINE BITARTRATE 0.48 MCG/KG/MIN: 1 INJECTION, SOLUTION, CONCENTRATE INTRAVENOUS at 14:58

## 2023-01-01 RX ADMIN — AMLODIPINE BESYLATE 5 MG: 10 TABLET ORAL at 05:31

## 2023-01-01 RX ADMIN — POTASSIUM CHLORIDE 20 MEQ: 1500 TABLET, EXTENDED RELEASE ORAL at 17:41

## 2023-01-01 RX ADMIN — MIDAZOLAM HYDROCHLORIDE 3 MG: 1 INJECTION, SOLUTION INTRAMUSCULAR; INTRAVENOUS at 03:28

## 2023-01-01 RX ADMIN — DIAZEPAM 10 MG: 5 INJECTION, SOLUTION INTRAMUSCULAR; INTRAVENOUS at 19:19

## 2023-01-01 RX ADMIN — AMIODARONE HYDROCHLORIDE 0.5 MG/MIN: 1.8 INJECTION, SOLUTION INTRAVENOUS at 18:24

## 2023-01-01 RX ADMIN — FUROSEMIDE 20 MG: 20 TABLET ORAL at 15:50

## 2023-01-01 RX ADMIN — METOPROLOL TARTRATE 5 MG: 5 INJECTION INTRAVENOUS at 22:43

## 2023-01-01 RX ADMIN — FENTANYL CITRATE 100 MCG: 50 INJECTION, SOLUTION INTRAMUSCULAR; INTRAVENOUS at 07:15

## 2023-01-01 RX ADMIN — ACETAMINOPHEN 1000 MG: 500 TABLET ORAL at 14:17

## 2023-01-01 RX ADMIN — AMPICILLIN AND SULBACTAM 3 G: 1; 2 INJECTION, POWDER, FOR SOLUTION INTRAMUSCULAR; INTRAVENOUS at 23:10

## 2023-01-01 RX ADMIN — LACTULOSE 30 ML: 20 SOLUTION ORAL at 11:22

## 2023-01-01 RX ADMIN — ATORVASTATIN CALCIUM 40 MG: 40 TABLET, FILM COATED ORAL at 18:13

## 2023-01-01 SDOH — ECONOMIC STABILITY: FOOD INSECURITY: WITHIN THE PAST 12 MONTHS, YOU WORRIED THAT YOUR FOOD WOULD RUN OUT BEFORE YOU GOT MONEY TO BUY MORE.: NEVER TRUE

## 2023-01-01 SDOH — HEALTH STABILITY: PHYSICAL HEALTH: ON AVERAGE, HOW MANY MINUTES DO YOU ENGAGE IN EXERCISE AT THIS LEVEL?: 0 MIN

## 2023-01-01 SDOH — ECONOMIC STABILITY: HOUSING INSECURITY: IN THE LAST 12 MONTHS, HOW MANY PLACES HAVE YOU LIVED?: 1

## 2023-01-01 SDOH — ECONOMIC STABILITY: FOOD INSECURITY: WITHIN THE PAST 12 MONTHS, THE FOOD YOU BOUGHT JUST DIDN'T LAST AND YOU DIDN'T HAVE MONEY TO GET MORE.: NEVER TRUE

## 2023-01-01 SDOH — ECONOMIC STABILITY: INCOME INSECURITY: IN THE LAST 12 MONTHS, WAS THERE A TIME WHEN YOU WERE NOT ABLE TO PAY THE MORTGAGE OR RENT ON TIME?: NO

## 2023-01-01 SDOH — ECONOMIC STABILITY: INCOME INSECURITY: HOW HARD IS IT FOR YOU TO PAY FOR THE VERY BASICS LIKE FOOD, HOUSING, MEDICAL CARE, AND HEATING?: NOT VERY HARD

## 2023-01-01 SDOH — HEALTH STABILITY: MENTAL HEALTH
STRESS IS WHEN SOMEONE FEELS TENSE, NERVOUS, ANXIOUS, OR CAN'T SLEEP AT NIGHT BECAUSE THEIR MIND IS TROUBLED. HOW STRESSED ARE YOU?: TO SOME EXTENT

## 2023-01-01 SDOH — HEALTH STABILITY: PHYSICAL HEALTH: ON AVERAGE, HOW MANY DAYS PER WEEK DO YOU ENGAGE IN MODERATE TO STRENUOUS EXERCISE (LIKE A BRISK WALK)?: 0 DAYS

## 2023-01-01 ASSESSMENT — COGNITIVE AND FUNCTIONAL STATUS - GENERAL
MOVING TO AND FROM BED TO CHAIR: A LOT
CLIMB 3 TO 5 STEPS WITH RAILING: A LITTLE
MOVING TO AND FROM BED TO CHAIR: A LITTLE
TURNING FROM BACK TO SIDE WHILE IN FLAT BAD: A LITTLE
MOVING FROM LYING ON BACK TO SITTING ON SIDE OF FLAT BED: A LOT
STANDING UP FROM CHAIR USING ARMS: A LOT
PERSONAL GROOMING: A LOT
EATING MEALS: A LITTLE
SUGGESTED CMS G CODE MODIFIER MOBILITY: CK
STANDING UP FROM CHAIR USING ARMS: A LITTLE
CLIMB 3 TO 5 STEPS WITH RAILING: TOTAL
WALKING IN HOSPITAL ROOM: A LOT
MOVING FROM LYING ON BACK TO SITTING ON SIDE OF FLAT BED: A LITTLE
MOBILITY SCORE: 21
DRESSING REGULAR UPPER BODY CLOTHING: A LOT
TOILETING: A LITTLE
SUGGESTED CMS G CODE MODIFIER MOBILITY: CL
DRESSING REGULAR UPPER BODY CLOTHING: A LITTLE
SUGGESTED CMS G CODE MODIFIER MOBILITY: CJ
MOBILITY SCORE: 18
EATING MEALS: A LITTLE
STANDING UP FROM CHAIR USING ARMS: A LITTLE
HELP NEEDED FOR BATHING: A LOT
WALKING IN HOSPITAL ROOM: A LITTLE
SUGGESTED CMS G CODE MODIFIER DAILY ACTIVITY: CK
PERSONAL GROOMING: A LITTLE
DAILY ACTIVITIY SCORE: 18
TOILETING: A LOT
SUGGESTED CMS G CODE MODIFIER DAILY ACTIVITY: CL
WALKING IN HOSPITAL ROOM: A LITTLE
HELP NEEDED FOR BATHING: A LITTLE
TURNING FROM BACK TO SIDE WHILE IN FLAT BAD: A LOT
DRESSING REGULAR LOWER BODY CLOTHING: A LOT
CLIMB 3 TO 5 STEPS WITH RAILING: A LITTLE
DAILY ACTIVITIY SCORE: 13
MOBILITY SCORE: 11
DRESSING REGULAR LOWER BODY CLOTHING: A LITTLE

## 2023-01-01 ASSESSMENT — ENCOUNTER SYMPTOMS
SHORTNESS OF BREATH: 0
NECK PAIN: 0
SHORTNESS OF BREATH: 0
SHORTNESS OF BREATH: 1
HEMOPTYSIS: 0
FEVER: 0
NAUSEA: 0
CHILLS: 0
WEAKNESS: 1
WEAKNESS: 1
TREMORS: 0
BLURRED VISION: 0
SPEECH CHANGE: 0
HEADACHES: 0
FLANK PAIN: 0
FEVER: 0
NAUSEA: 0
CHILLS: 0
FEVER: 0
NERVOUS/ANXIOUS: 1
SHORTNESS OF BREATH: 1
WEIGHT LOSS: 0
NAUSEA: 0
COUGH: 1
VOMITING: 0
FOCAL WEAKNESS: 0
BACK PAIN: 0
ORTHOPNEA: 0
PALPITATIONS: 0
PHOTOPHOBIA: 0
SHORTNESS OF BREATH: 0
BRUISES/BLEEDS EASILY: 0
HALLUCINATIONS: 0
VOMITING: 0
POLYDIPSIA: 0
CHILLS: 0
GENERAL WELL-BEING: POOR
HEARTBURN: 0
SPUTUM PRODUCTION: 1
VOMITING: 0
DOUBLE VISION: 0

## 2023-01-01 ASSESSMENT — GAIT ASSESSMENTS
GAIT LEVEL OF ASSIST: STANDBY ASSIST
DISTANCE (FEET): 40
DEVIATION: NO DEVIATION

## 2023-01-01 ASSESSMENT — LIFESTYLE VARIABLES
TOTAL SCORE: 0
ON A TYPICAL DAY WHEN YOU DRINK ALCOHOL HOW MANY DRINKS DO YOU HAVE: 0
EVER HAD A DRINK FIRST THING IN THE MORNING TO STEADY YOUR NERVES TO GET RID OF A HANGOVER: NO
TOTAL SCORE: 0
ALCOHOL_USE: NO
SUBSTANCE_ABUSE: 0
CONSUMPTION TOTAL: NEGATIVE
SKIP TO QUESTIONS 9-10: 1
TOTAL SCORE: 0
HAVE PEOPLE ANNOYED YOU BY CRITICIZING YOUR DRINKING: NO
HOW OFTEN DO YOU HAVE A DRINK CONTAINING ALCOHOL: NEVER
DOES PATIENT WANT TO STOP DRINKING: CANNOT ASSESS
AUDIT-C TOTAL SCORE: 0
TOTAL SCORE: 0
EVER FELT BAD OR GUILTY ABOUT YOUR DRINKING: NO
EVER FELT BAD OR GUILTY ABOUT YOUR DRINKING: NO
DOES PATIENT WANT TO STOP DRINKING: NO
ON A TYPICAL DAY WHEN YOU DRINK ALCOHOL HOW MANY DRINKS DO YOU HAVE: 0
HAVE YOU EVER FELT YOU SHOULD CUT DOWN ON YOUR DRINKING: NO
ALCOHOL_USE: NO
TOTAL SCORE: 0
HOW MANY TIMES IN THE PAST YEAR HAVE YOU HAD 5 OR MORE DRINKS IN A DAY: 0
HOW MANY STANDARD DRINKS CONTAINING ALCOHOL DO YOU HAVE ON A TYPICAL DAY: PATIENT DOES NOT DRINK
HAVE YOU EVER FELT YOU SHOULD CUT DOWN ON YOUR DRINKING: NO
HOW OFTEN DO YOU HAVE SIX OR MORE DRINKS ON ONE OCCASION: NEVER
TOTAL SCORE: 0
AVERAGE NUMBER OF DAYS PER WEEK YOU HAVE A DRINK CONTAINING ALCOHOL: 0
HOW MANY TIMES IN THE PAST YEAR HAVE YOU HAD 5 OR MORE DRINKS IN A DAY: 0
HAVE PEOPLE ANNOYED YOU BY CRITICIZING YOUR DRINKING: NO
EVER HAD A DRINK FIRST THING IN THE MORNING TO STEADY YOUR NERVES TO GET RID OF A HANGOVER: NO
CONSUMPTION TOTAL: NEGATIVE
AVERAGE NUMBER OF DAYS PER WEEK YOU HAVE A DRINK CONTAINING ALCOHOL: 0

## 2023-01-01 ASSESSMENT — PATIENT HEALTH QUESTIONNAIRE - PHQ9
1. LITTLE INTEREST OR PLEASURE IN DOING THINGS: NOT AT ALL
1. LITTLE INTEREST OR PLEASURE IN DOING THINGS: NOT AT ALL
SUM OF ALL RESPONSES TO PHQ9 QUESTIONS 1 AND 2: 0
2. FEELING DOWN, DEPRESSED, IRRITABLE, OR HOPELESS: NOT AT ALL
1. LITTLE INTEREST OR PLEASURE IN DOING THINGS: NOT AT ALL
SUM OF ALL RESPONSES TO PHQ9 QUESTIONS 1 AND 2: 0
SUM OF ALL RESPONSES TO PHQ9 QUESTIONS 1 AND 2: 0
1. LITTLE INTEREST OR PLEASURE IN DOING THINGS: NOT AT ALL
CLINICAL INTERPRETATION OF PHQ2 SCORE: 0
2. FEELING DOWN, DEPRESSED, IRRITABLE, OR HOPELESS: NOT AT ALL
CLINICAL INTERPRETATION OF PHQ2 SCORE: 0
1. LITTLE INTEREST OR PLEASURE IN DOING THINGS: NOT AT ALL
SUM OF ALL RESPONSES TO PHQ9 QUESTIONS 1 AND 2: 0
SUM OF ALL RESPONSES TO PHQ9 QUESTIONS 1 AND 2: 0
2. FEELING DOWN, DEPRESSED, IRRITABLE, OR HOPELESS: NOT AT ALL

## 2023-01-01 ASSESSMENT — COPD QUESTIONNAIRES
DURING THE PAST 4 WEEKS HOW MUCH DID YOU FEEL SHORT OF BREATH: SOME OF THE TIME
DO YOU EVER COUGH UP ANY MUCUS OR PHLEGM?: YES, A FEW DAYS A WEEK OR MONTH
HAVE YOU SMOKED AT LEAST 100 CIGARETTES IN YOUR ENTIRE LIFE: YES
COPD SCREENING SCORE: 6

## 2023-01-01 ASSESSMENT — SOCIAL DETERMINANTS OF HEALTH (SDOH)
HOW OFTEN DO YOU GET TOGETHER WITH FRIENDS OR RELATIVES?: NEVER
HOW OFTEN DO YOU HAVE SIX OR MORE DRINKS ON ONE OCCASION: NEVER
HOW OFTEN DO YOU ATTENT MEETINGS OF THE CLUB OR ORGANIZATION YOU BELONG TO?: NEVER
HOW OFTEN DO YOU ATTENT MEETINGS OF THE CLUB OR ORGANIZATION YOU BELONG TO?: NEVER
DO YOU BELONG TO ANY CLUBS OR ORGANIZATIONS SUCH AS CHURCH GROUPS UNIONS, FRATERNAL OR ATHLETIC GROUPS, OR SCHOOL GROUPS?: NO
WITHIN THE PAST 12 MONTHS, YOU WORRIED THAT YOUR FOOD WOULD RUN OUT BEFORE YOU GOT THE MONEY TO BUY MORE: NEVER TRUE
IN A TYPICAL WEEK, HOW MANY TIMES DO YOU TALK ON THE PHONE WITH FAMILY, FRIENDS, OR NEIGHBORS?: NEVER
HOW OFTEN DO YOU ATTEND CHURCH OR RELIGIOUS SERVICES?: NEVER
IN A TYPICAL WEEK, HOW MANY TIMES DO YOU TALK ON THE PHONE WITH FAMILY, FRIENDS, OR NEIGHBORS?: NEVER
HOW MANY DRINKS CONTAINING ALCOHOL DO YOU HAVE ON A TYPICAL DAY WHEN YOU ARE DRINKING: PATIENT DOES NOT DRINK
DO YOU BELONG TO ANY CLUBS OR ORGANIZATIONS SUCH AS CHURCH GROUPS UNIONS, FRATERNAL OR ATHLETIC GROUPS, OR SCHOOL GROUPS?: NO
HOW OFTEN DO YOU GET TOGETHER WITH FRIENDS OR RELATIVES?: NEVER
HOW HARD IS IT FOR YOU TO PAY FOR THE VERY BASICS LIKE FOOD, HOUSING, MEDICAL CARE, AND HEATING?: NOT VERY HARD
HOW OFTEN DO YOU HAVE A DRINK CONTAINING ALCOHOL: NEVER
HOW OFTEN DO YOU ATTEND CHURCH OR RELIGIOUS SERVICES?: NEVER

## 2023-01-01 ASSESSMENT — FIBROSIS 4 INDEX
FIB4 SCORE: 3.35
FIB4 SCORE: 3.35
FIB4 SCORE: 6.51
FIB4 SCORE: 3.35
FIB4 SCORE: 3.35
FIB4 SCORE: 1.58
FIB4 SCORE: 2.55
FIB4 SCORE: 1.94
FIB4 SCORE: 1.58
FIB4 SCORE: 6.48

## 2023-01-01 ASSESSMENT — PAIN DESCRIPTION - PAIN TYPE
TYPE: OTHER (COMMENT)
TYPE: CHRONIC PAIN
TYPE: ACUTE PAIN
TYPE: ACUTE PAIN
TYPE: OTHER (COMMENT)
TYPE: ACUTE PAIN

## 2023-01-01 ASSESSMENT — ACTIVITIES OF DAILY LIVING (ADL): BATHING_REQUIRES_ASSISTANCE: 1

## 2023-02-09 PROBLEM — R79.89 ABNORMAL TSH: Status: RESOLVED | Noted: 2017-04-07 | Resolved: 2023-01-01

## 2023-02-09 PROBLEM — Z99.81 OXYGEN DEPENDENT: Status: ACTIVE | Noted: 2023-01-01

## 2023-02-09 PROBLEM — R73.03 PREDIABETES: Status: ACTIVE | Noted: 2023-01-01

## 2023-02-09 PROBLEM — Z00.00 HEALTHCARE MAINTENANCE: Status: ACTIVE | Noted: 2023-01-01

## 2023-02-09 NOTE — ASSESSMENT & PLAN NOTE
Patient declines referrals to specialist, immunizations, and screenings prefers to live out the rest of her days the way she wants to  I support her autonomy

## 2023-02-09 NOTE — PROGRESS NOTES
Virtual Visit: Established Patient   This visit was conducted via Zoom using secure and encrypted videoconferencing technology.   The patient was in their home in the state of [place].    The patient's identity was confirmed and verbal consent was obtained for this virtual visit.    Subjective:   CC:   Chief Complaint   Patient presents with    Follow-Up     Labs     [facility]     Mallorie Xie is a 67 y.o. female presenting to est care   Prediabetes  Most recent A1c a few days ago showed 6.3, an increase from 6.0 previously    Anemia  Most recent labs from last week showed mild anemia encouraged patient to eat iron rich foods    LUIS (obstructive sleep apnea)  Doesn't use CPAP; can't tolerate it; doesn't want to use it; reports sleeping well w/o it      Thyroid nodule  Offered ultrasound for patient since her last one was 2017   she declined    Acquired hypothyroidism  Recent TSH was 4.3 which has improved from 6.1 previously   increasing dose from 50 to 75 mcg to better manage patient's weight, fatigue   advised rechecking in 3 months but she would like to wait till her annual labs    Healthcare maintenance  Patient declines referrals to specialist, immunizations, and screenings prefers to live out the rest of her days the way she wants to  I support her autonomy       ROS   See HPI    Current medicines (including changes today)  Current Outpatient Medications   Medication Sig Dispense Refill    METAMUCIL FIBER PO Take  by mouth.      atorvastatin (LIPITOR) 40 MG Tab Take 1 Tablet by mouth every day. 90 Tablet 3    lisinopril-hydrochlorothiazide (PRINZIDE) 10-12.5 MG per tablet Take 1 Tablet by mouth every day. 90 Tablet 3    levothyroxine (SYNTHROID) 75 MCG Tab Take 1 Tablet by mouth every morning on an empty stomach. 90 Tablet 3    albuterol 108 (90 Base) MCG/ACT Aero Soln inhalation aerosol Inhale 2 Puffs every four hours as needed for Shortness of Breath. 1 Each 2    Spacer/Aero-Holding Chambers (COMPACT  "SPACE CHAMBER) Device Inhale 1 Units by mouth 1 time daily as needed. 1 Device 1    aspirin 81 MG tablet Take 81 mg by mouth every day.      Multiple Vitamins-Minerals (MULTIVITAMIN ADULT PO) Take  by mouth.       No current facility-administered medications for this visit.       Patient Active Problem List    Diagnosis Date Noted    Prediabetes 02/09/2023    Oxygen dependent 02/09/2023    Healthcare maintenance 02/09/2023    Edema 08/09/2022    Anemia 08/09/2022    Slow transit constipation 08/09/2022    Impaired fasting blood sugar 07/20/2022    Chronic respiratory failure with hypoxia (HCC) 05/08/2018    Encounter to establish care 04/23/2018    Intraductal carcinoma in situ of right breast 10/24/2017    LUIS (obstructive sleep apnea) 08/11/2017    Acquired hypothyroidism 06/19/2017    Thyroid nodule 04/07/2017    Pulmonary emphysema (HCC) 04/07/2017    Vision changes 04/07/2017    Essential hypertension 03/31/2017    Morbid obesity with BMI of 45.0-49.9, adult ([facility]) 03/31/2017    Former cigarette smoker 03/31/2017    Dyspnea on exertion 03/31/2017    Left shoulder pain 08/27/2014    Upper back pain on left side 08/27/2014    Radiculopathy of arm 12/04/2012    Upper extremity pain, diffuse 12/04/2012    Tobacco abuse         Objective:   BP (!) 149/89 Comment: pt stated  Ht 1.626 m (5' 4\") Comment: pt stated  Wt (!) 131 kg (288 lb) Comment: pt stated  BMI 49.44 kg/m²     Physical Exam:  Constitutional: Alert, no distress, well-groomed.  Skin: No rashes in visible areas.  Eye: Round. Conjunctiva clear, lids normal. No icterus.   ENMT: Lips pink without lesions, good dentition, moist mucous membranes. Phonation normal.  Neck: No masses, no thyromegaly. Moves freely without pain.  Respiratory: Unlabored respiratory effort, no cough or audible wheeze  Psych: Alert and oriented x3, normal affect and mood.     Assessment and Plan:   The following treatment plan was discussed:     1. Prediabetes  - Comp Metabolic " Panel; Future  - HEMOGLOBIN A1C; Future    2. Chronic obstructive pulmonary disease, unspecified COPD type (HCC)    3. Mixed hyperlipidemia  - atorvastatin (LIPITOR) 40 MG Tab; Take 1 Tablet by mouth every day.  Dispense: 90 Tablet; Refill: 3  - Lipid Profile; Future    4. Abnormal TSH  - levothyroxine (SYNTHROID) 75 MCG Tab; Take 1 Tablet by mouth every morning on an empty stomach.  Dispense: 90 Tablet; Refill: 3  - TSH; Future  - FREE THYROXINE; Future  - TRIIDOTHYRONINE; Future    5. Edema, unspecified type  - lisinopril-hydrochlorothiazide (PRINZIDE) 10-12.5 MG per tablet; Take 1 Tablet by mouth every day.  Dispense: 90 Tablet; Refill: 3    6. Anemia, unspecified type  Advised patient to get iron rich foods    7. Oxygen dependent  Need to change patient's provider from [facility] to [facility]    8. Pulmonary emphysema, unspecified emphysema type (HCC)  Declines pulm referral     9. Chronic respiratory failure with hypoxia (HCC)  Declines pulm referral     10. Impaired fasting blood sugar  Cont to work on blood sugar management; doesn't each much sugar; avoid starchy carbs, increase activity     11. Iron deficiency anemia, unspecified iron deficiency anemia type  - CBC WITH DIFFERENTIAL; Future    12. Vitamin D deficiency  - VITAMIN D,25 HYDROXY (DEFICIENCY); Future    13. LUIS (obstructive sleep apnea)  Reports well controlled w/o cpap     14. Acquired hypothyroidism  Redo thyroid panel in Aug          Follow-up: Return in about 6 months (around 8/9/2023) for lab results, gen check in.

## 2023-02-09 NOTE — ASSESSMENT & PLAN NOTE
Recent TSH was 4.3 which has improved from 6.1 previously   increasing dose from 50 to 75 mcg to better manage patient's weight, fatigue   advised rechecking in 3 months but she would like to wait till her annual labs

## 2023-09-07 NOTE — ASSESSMENT & PLAN NOTE
Pt didn't notice a difference in her fatigue when I increased her levothyroxine from 50 mcg to 75 mcg a day; we have some room to play with her tsh as it was 3.2, so I'm increasing it to  100 mcg a day

## 2023-09-07 NOTE — PROGRESS NOTES
Chief Complaint   Patient presents with    Annual Wellness Visit       HPI:  Mallorie Xie is a 68 y.o. here for Medicare Annual Wellness Visit     Patient Active Problem List    Diagnosis Date Noted    Prediabetes 02/09/2023    Oxygen dependent 02/09/2023    Healthcare maintenance 02/09/2023    Edema 08/09/2022    Anemia 08/09/2022    Slow transit constipation 08/09/2022    Impaired fasting blood sugar 07/20/2022    Chronic respiratory failure with hypoxia (HCC) 05/08/2018    Encounter to establish care 04/23/2018    Intraductal carcinoma in situ of right breast 10/24/2017    LUIS (obstructive sleep apnea) 08/11/2017    Acquired hypothyroidism 06/19/2017    Thyroid nodule 04/07/2017    Pulmonary emphysema (HCC) 04/07/2017    Vision changes 04/07/2017    Essential hypertension 03/31/2017    Morbid obesity with BMI of 45.0-49.9, adult (HCC) 03/31/2017    Former cigarette smoker 03/31/2017    Dyspnea on exertion 03/31/2017    Left shoulder pain 08/27/2014    Upper back pain on left side 08/27/2014    Radiculopathy of arm 12/04/2012    Upper extremity pain, diffuse 12/04/2012    Tobacco abuse        Current Outpatient Medications   Medication Sig Dispense Refill    albuterol 108 (90 Base) MCG/ACT Aero Soln inhalation aerosol Inhale 2 Puffs every 6 hours as needed for Shortness of Breath. 1 Each 11    levothyroxine (SYNTHROID) 100 MCG Tab Take 1 Tablet by mouth every morning on an empty stomach. 90 Tablet 3    nystatin (MYCOSTATIN) 443805 UNIT/GM Cream topical cream Apply 1 g topically 2 times a day. Prn rash 30 g 3    METAMUCIL FIBER PO Take  by mouth.      atorvastatin (LIPITOR) 40 MG Tab Take 1 Tablet by mouth every day. 90 Tablet 3    lisinopril-hydrochlorothiazide (PRINZIDE) 10-12.5 MG per tablet Take 1 Tablet by mouth every day. 90 Tablet 3    aspirin 81 MG tablet Take 81 mg by mouth every day.      Multiple Vitamins-Minerals (MULTIVITAMIN ADULT PO) Take  by mouth.       No current facility-administered  medications for this visit.          Current supplements as per medication list.     Allergies: Bee venom    Current social contact/activities: not interested in social activities     She  reports that she quit smoking about 6 years ago. Her smoking use included cigarettes. She started smoking about 53 years ago. She has a 47.0 pack-year smoking history. She has never used smokeless tobacco. She reports that she does not drink alcohol and does not use drugs.  Counseling given: Not Answered      ROS:    Gait: Uses no assistive device  Ostomy: No  Other tubes: No  Amputations: Yes-partial R breast   Chronic oxygen use: Yes  Last eye exam: 2021  Wears hearing aids: No   : Reports urinary leakage during the last 6 months that has not interfered at all with their daily activities or sleep.    Screening:    Depression Screening  Little interest or pleasure in doing things?no  0 - not at all  Feeling down, depressed , or hopeless?no  0 - not at all  Trouble falling or staying asleep, or sleeping too much?     Feeling tired or having little energy?     Poor appetite or overeating?     Feeling bad about yourself - or that you are a failure or have let yourself or your family down?    Trouble concentrating on things, such as reading the newspaper or watching television?    Moving or speaking so slowly that other people could have noticed.  Or the opposite - being so fidgety or restless that you have been moving around a lot more than usual?     Thoughts that you would be better off dead, or of hurting yourself?     Patient Health Questionnaire Score:      If depressive symptoms identified deferred to follow up visit unless specifically addressed in assessment and plan.    Interpretation of PHQ-9 Total Score   Score Severity   1-4 No Depression   5-9 Mild Depression   10-14 Moderate Depression   15-19 Moderately Severe Depression   20-27 Severe Depression    Screening for Cognitive Impairment  Do you or any of your friends  or family members have any concern about your memory?no No  Three Minute Recall (Banana, [place], Chair) 33/3    Javier clock face with all 12 numbers and set the hands to show 20 past 8.yes  Yes    Cognitive concerns identified deferred for follow up unless specifically addressed in assessment and plan.    Fall Risk Assessment  Has the patient had two or more falls in the last year or any fall with injury in the last year? no No    Safety Assessment  Do you always wear your seatbelt?yes  Yes  Any changes to home needed to function safely?eventually  Yes  Difficulty hearing. no No  Patient counseled about all safety risks that were identified.    Functional Assessment ADLs  Are there any barriers preventing you from cooking for yourself or meeting nutritional needs? no No.    Are there any barriers preventing you from driving safely or obtaining transportation? no No.    Are there any barriers preventing you from using a telephone or calling for help? no No    Are there any barriers preventing you from shopping?no  No.    Are there any barriers preventing you from taking care of your own finances?no  No    Are there any barriers preventing you from managing your medications? no No    Are there any barriers preventing you from showering, bathing or dressing yourself?yes Yes    Are there any barriers preventing you from doing housework or laundry?no  No  Are there any barriers preventing you from using the toilet?noNo  Are you currently engaging in any exercise or physical activity? no No.      Self-Assessment of Health  What is your perception of your health? poor Poor  Do you sleep more than six hours a night? Yes  In the past 7 days, how much did pain keep you from doing your normal work?no  None  Do you spend quality time with family or friends (virtually or in person)? Yes  Do you usually eat a heart healthy diet that constists of a variety of fruits, vegetables, whole grains and fiber? yes Yes  Do you eat foods  high in fat and/or Fast Food more than three times per week? No No    Advance Care Planning  Do you have an Advance Directive, Living Will, Durable Power of , or POLST? no No                 Health Maintenance Summary            Overdue - Annual Wellness Visit (Every 366 Days) Overdue - never done      No completion history exists for this topic.              Overdue - Pneumococcal Vaccine: 65+ Years (2 - PCV) Overdue since 2018  Imm Admin: Pneumococcal polysaccharide vaccine (PPSV-23)              IMM DTaP/Tdap/Td Vaccine (2 - Td or Tdap) Next due on 2027  Imm Admin: Tdap Vaccine              Hepatitis C Screening  Tentatively Complete      10/07/2020  Hepatitis C Antibody component of HCV Scrn ( [id number] 1xLife)              Hepatitis A Vaccine (Hep A) (Series Information) Aged Out      No completion history exists for this topic.              Hepatitis B Vaccine (Hep B) (Series Information) Aged Out      No completion history exists for this topic.              HPV Vaccines (Series Information) Aged Out      No completion history exists for this topic.              Polio Vaccine (Inactivated Polio) (Series Information) Aged Out      No completion history exists for this topic.              IMM MENINGOCOCCAL ACWY VACCINE (Series Information) Aged Out      No completion history exists for this topic.              Discontinued - Mammogram  Discontinued      2019  MA-SCREENING MAMMO BILAT W/TOMOSYNTHESIS W/CAD    2018  MA-MAMMO DIAGNOSTIC BILAT W/IGNACIO W/CAD    2017  MA-DIAGNOSTIC DIGITAL MAMMO-UNILAT RIGHT    2017  MA-MAMMO SCREENING BILAT W/IGNACIO W/CAD              Discontinued - Bone Density Scan  Discontinued      No completion history exists for this topic.              Discontinued - Colorectal Cancer Screening  Discontinued      No completion history exists for this topic.              Discontinued - Influenza Vaccine  Discontinued       2017  Imm Admin: Influenza Vaccine Quad Inj (Pf)              Discontinued - Zoster (Shingles) Vaccines  Discontinued      No completion history exists for this topic.              Discontinued - COVID-19 Vaccine  Discontinued      No completion history exists for this topic.              Discontinued - Annual Pulmonary Function Test / Spirometry  Discontinued      2018  AMB PULMONARY FUNCTION TEST/LAB    2017  AMB PULMONARY FUNCTION TEST/LAB                    Patient Care Team:  Cheryl M. Demucha, A.P.R.N. as PCP - General (Nurse Practitioner Family)  Marina Mansfield M.D. (Surgery)  [facility]  as Respiratory Therapist  Feliciano Boateng M.D. as Consulting Physician (Radiation Oncology)      Social History     Tobacco Use    Smoking status: Former     Current packs/day: 0.00     Average packs/day: 1 pack/day for 47.0 years (47.0 ttl pk-yrs)     Types: Cigarettes     Start date: 1969     Quit date: 2016     Years since quittin.7    Smokeless tobacco: Never   Vaping Use    Vaping Use: Never used   Substance Use Topics    Alcohol use: No     Alcohol/week: 0.0 oz    Drug use: No     Family History   Problem Relation Age of Onset    No Known Problems Mother     No Known Problems Father     Cancer Sister         cervical    Kidney cancer Sister     Lung Cancer Maternal Uncle     Melanoma Maternal Uncle     No Known Problems Maternal Grandmother     No Known Problems Maternal Grandfather     No Known Problems Paternal Grandmother     No Known Problems Paternal Grandfather     No Known Problems Daughter     Sleep Apnea Neg Hx      She  has a past medical history of Abnormal TSH (2017), Anemia (2022), Bowel habit changes, Breast cancer (HCC), Breath shortness, Cancer (HCC), Chickenpox, Colon polyps, Congestive heart failure (HCC), Dental disorder, Emphysema of lung (HCC), Essential hypertension (2017), Heart burn, Indigestion, Mumps, Obesity, Sleep apnea, Thyroid  "disease, and Tobacco abuse.    She has no past medical history of Anxiety, Arrhythmia, Asthma, Blood transfusion without reported diagnosis, Clotting disorder ([facility]), Depression, Diabetes ([facility]), GERD (gastroesophageal reflux disease), Heart attack ([facility]), Heart murmur, HIV (human immunodeficiency virus infection) ([facility]), Hyperlipidemia, Kidney disease, Migraine, Seizure (HCC), or Stroke ([facility]).   Past Surgical History:   Procedure Laterality Date    MASTECTOMY Right 10/24/2017    Procedure: MASTECTOMY- PARTIAL, WIRE LOCALIZED;  Surgeon: Marina Mansfield M.D.;  Location: SURGERY SAME DAY [facility];  Service: General    COLONOSCOPY         Exam:   /62 (BP Location: Right arm, Patient Position: Sitting, BP Cuff Size: Small adult)   Pulse 90   Temp 36.1 °C (97 °F) (Temporal)   Resp (!) 22   Ht 1.626 m (5' 4\")   Wt (!) 130 kg (287 lb 9.6 oz)   SpO2 95%  Body mass index is 49.37 kg/m².    Hearing excellent.    Dentition whole mouth   Alert, oriented in no acute distress.  Eye contact is good, speech goal directed, affect calm    Assessment and Plan. The following treatment and monitoring plan is recommended:    1. Chronic obstructive pulmonary disease, unspecified COPD type ([facility])  - albuterol 108 (90 Base) MCG/ACT Aero Soln inhalation aerosol; Inhale 2 Puffs every 6 hours as needed for Shortness of Breath.  Dispense: 1 Each; Refill: 11    2. Acquired hypothyroidism  - levothyroxine (SYNTHROID) 100 MCG Tab; Take 1 Tablet by mouth every morning on an empty stomach.  Dispense: 90 Tablet; Refill: 3  - TSH WITH REFLEX TO FT4; Future    3. Oxygen dependent  - Referral to Home Oxygen    4. Rash  - nystatin (MYCOSTATIN) 716381 UNIT/GM Cream topical cream; Apply 1 g topically 2 times a day. Prn rash  Dispense: 30 g; Refill: 3    5. Essential hypertension    6. Thyroid nodule    7. Prediabetes  - Comp Metabolic Panel; Future  - HEMOGLOBIN A1C; Future    8. Anemia, unspecified type  - CBC WITH DIFFERENTIAL; Future    9. " Vitamin D deficiency  - VITAMIN D,25 HYDROXY (DEFICIENCY); Future    10. Mixed hyperlipidemia  - Lipid Profile; Future      Services suggested: No services needed at this time  Health Care Screening: Age-appropriate preventive services recommended by USPTF and ACIP covered by Medicare were discussed today. Services ordered if indicated and agreed upon by the patient.  Referrals offered: Community-based lifestyle interventions to reduce health risks and promote self-management and wellness, fall prevention, nutrition, physical activity, tobacco-use cessation, weight loss, and mental health services as per orders if indicated.    Discussion today about general wellness and lifestyle habits:    Prevent falls and reduce trip hazards; Cautioned about securing or removing rugs.  Have a working fire alarm and carbon monoxide detector;   Engage in regular physical activity and social activities     Follow-up: Return in about 5 months (around 2/7/2024) for 5 mos gen check in, 12 mos medicare wellness.

## 2023-10-20 PROBLEM — E87.1 HYPONATREMIA: Status: ACTIVE | Noted: 2023-01-01

## 2023-10-20 NOTE — TELEPHONE ENCOUNTER
Requested Prescriptions     Pending Prescriptions Disp Refills    lisinopril-hydrochlorothiazide (PRINZIDE) 10-12.5 MG per tablet [Pharmacy Med Name: LISINOP/HCTZ TAB 10-12.5] 90 Tablet 3     Sig: TAKE 1 TABLET DAILY    atorvastatin (LIPITOR) 40 MG Tab [Pharmacy Med Name: ATORVASTATIN TAB 40MG] 90 Tablet 3     Sig: TAKE 1 TABLET DAILY      Last office visit: 9/7/23  Last lab: 8/15/23

## 2023-10-21 PROBLEM — J44.9 COPD (CHRONIC OBSTRUCTIVE PULMONARY DISEASE) (HCC): Status: ACTIVE | Noted: 2017-04-07

## 2023-10-21 PROBLEM — J18.9 PNEUMONIA: Status: ACTIVE | Noted: 2023-01-01

## 2023-10-21 NOTE — ASSESSMENT & PLAN NOTE
68-year-old female with history of morbid obesity, oxygen dependent COPD, CHF, sleep apnea presented with shortness of breath, cough for 2 weeks, worsening of hypoxia at home  Chest x-ray: Right lower lobe consolidation  COVID-19/RSV/influenza negative  Plan: Ceftriaxone, azithromycin   Serum procalcitonin mildly elevated at 0.3.

## 2023-10-21 NOTE — H&P
Hospital Medicine History & Physical Note    Date of Service  10/20/2023    Primary Care Physician  Cheryl M. Demucha, A.P.R.N.      Code Status  Full Code    Chief Complaint  Chief Complaint   Patient presents with    Palpitations     Pt reports HR has felt ~1 week. (- thinners) (-) CP (+) dizziness with exertion.        History of Presenting Illness  Mallorie Xie is a 68 y.o. female with past medical history of COPD oxygen dependent on 4 L, CHF, hypothyroidism, sleep apnea, hypertension, GERD, who presented 10/20/2023 with complaints of dyspnea worsening on exertion, palpitations, occasional cough with white sputum, worsening of oxygen requirement.  In ER she is on 4 L of oxygen which is her baseline at rest.  Respirations are mildly elaborated, slightly hypertensive 168/77, afebrile.  COVID-19/influenza/RSV screen is negative.  Chemistry showed sodium 118, chloride 76.  CBC showed WBC 11.0, hemoglobin 11.7.  Troponin is not elevated.  Chest x-ray showed right basilar consolidation.  Mild cardiomegaly.  She was taking lisinopril/hydrochlorothiazide for blood pressure.  Dose of levothyroxine was increased from 50 to 100 mcg by her PCP a few weeks ago.  She was cutting in a half prescribed dose of levothyroxine and continue taking 50 mcg as she believed 100 mcg was making her feeling worse.  Oral intake of food and fluids has been decreased in the last 3 days.  She did not want to drink fluids because it was making her going to the bathroom Frequently.  In ER she was given Unasyn and azithromycin, as well as prednisone 60 mg once.    I discussed the plan of care with patient and ERP .    Review of Systems  Review of Systems   Constitutional:  Negative for chills, fever and weight loss.   HENT:  Negative for ear pain, hearing loss and tinnitus.    Eyes:  Negative for blurred vision, double vision and photophobia.   Respiratory:  Positive for cough, sputum production and shortness of breath. Negative for  hemoptysis.    Cardiovascular:  Negative for chest pain, palpitations and orthopnea.   Gastrointestinal:  Negative for heartburn, nausea and vomiting.   Genitourinary:  Negative for dysuria, flank pain, frequency and hematuria.   Musculoskeletal:  Negative for back pain, joint pain and neck pain.   Skin:  Negative for itching and rash.   Neurological:  Positive for weakness. Negative for tremors, speech change, focal weakness and headaches.   Endo/Heme/Allergies:  Negative for environmental allergies and polydipsia. Does not bruise/bleed easily.   Psychiatric/Behavioral:  Negative for hallucinations and substance abuse. The patient is nervous/anxious.        Past Medical History   has a past medical history of Abnormal TSH (4/7/2017), Anemia (8/9/2022), Bowel habit changes, Breast cancer (HCC), Breath shortness, Cancer (HCC), Chickenpox, Colon polyps, Congestive heart failure (HCC), Dental disorder, Emphysema of lung (HCC), Essential hypertension (03/31/2017), Heart burn, Indigestion, Mumps, Obesity, Sleep apnea, Thyroid disease, and Tobacco abuse.    Surgical History   has a past surgical history that includes colonoscopy and mastectomy (Right, 10/24/2017).     Family History  family history includes Cancer in her sister; Kidney cancer in her sister; Lung Cancer in her maternal uncle; Melanoma in her maternal uncle; No Known Problems in her daughter, father, maternal grandfather, maternal grandmother, mother, paternal grandfather, and paternal grandmother.   Family history reviewed with patient. There is no family history that is pertinent to the chief complaint.     Social History   reports that she quit smoking about 6 years ago. Her smoking use included cigarettes. She started smoking about 53 years ago. She has a 47.0 pack-year smoking history. She has never used smokeless tobacco. She reports that she does not drink alcohol and does not use drugs.    Allergies  Allergies   Allergen Reactions    Bee Venom  Anaphylaxis       Medications  Prior to Admission Medications   Prescriptions Last Dose Informant Patient Reported? Taking?   METAMUCIL FIBER PO   Yes No   Sig: Take  by mouth.   Multiple Vitamins-Minerals (MULTIVITAMIN ADULT PO)   Yes No   Sig: Take  by mouth.   albuterol 108 (90 Base) MCG/ACT Aero Soln inhalation aerosol   No No   Sig: Inhale 2 Puffs every 6 hours as needed for Shortness of Breath.   aspirin 81 MG tablet   Yes No   Sig: Take 81 mg by mouth every day.   atorvastatin (LIPITOR) 40 MG Tab   No No   Sig: Take 1 Tablet by mouth every day.   levothyroxine (SYNTHROID) 50 MCG Tab   No No   Sig: Take 1 Tablet by mouth every morning on an empty stomach.   lisinopril-hydrochlorothiazide (PRINZIDE) 10-12.5 MG per tablet   No No   Sig: Take 1 Tablet by mouth every day.   nystatin (MYCOSTATIN) 912095 UNIT/GM Cream topical cream   No No   Sig: Apply 1 g topically 2 times a day. Prn rash      Facility-Administered Medications: None       Physical Exam  Temp:  [36.8 °C (98.2 °F)-36.9 °C (98.4 °F)] 36.8 °C (98.2 °F)  Pulse:  [75-82] 81  Resp:  [18-26] 18  BP: (142-168)/(69-80) 147/69  SpO2:  [95 %-98 %] 96 %  Blood Pressure : (!) 147/69   Temperature: 36.8 °C (98.2 °F)   Pulse: 81   Respiration: 18   Pulse Oximetry: 96 %       Physical Exam  Vitals and nursing note reviewed.   Constitutional:       General: She is not in acute distress.     Appearance: Normal appearance.   HENT:      Head: Normocephalic and atraumatic.      Nose: Nose normal.      Mouth/Throat:      Mouth: Mucous membranes are moist.   Eyes:      Extraocular Movements: Extraocular movements intact.      Pupils: Pupils are equal, round, and reactive to light.   Cardiovascular:      Rate and Rhythm: Normal rate and regular rhythm.   Pulmonary:      Effort: Pulmonary effort is normal. Tachypnea present.      Breath sounds: Examination of the right-middle field reveals decreased breath sounds. Examination of the left-middle field reveals decreased  "breath sounds. Examination of the right-lower field reveals decreased breath sounds. Examination of the left-lower field reveals decreased breath sounds. Decreased breath sounds present.   Abdominal:      General: Abdomen is flat. There is distension.      Tenderness: There is no abdominal tenderness.   Musculoskeletal:         General: No swelling or deformity. Normal range of motion.      Cervical back: Normal range of motion and neck supple.   Skin:     General: Skin is warm and dry.   Neurological:      General: No focal deficit present.      Mental Status: She is alert and oriented to person, place, and time.   Psychiatric:         Mood and Affect: Mood is anxious.         Behavior: Behavior normal.         Laboratory:  Recent Labs     10/20/23  2045   WBC 11.0*   RBC 3.88*   HEMOGLOBIN 11.7*   HEMATOCRIT 33.5*   MCV 86.3   MCH 30.2   MCHC 34.9   RDW 39.0   PLATELETCT 214   MPV 11.7     Recent Labs     10/20/23  2045   SODIUM 118*   POTASSIUM 4.2   CHLORIDE 76*   CO2 30   GLUCOSE 85   BUN 11   CREATININE 0.52   CALCIUM 9.4     Recent Labs     10/20/23  2045   ALTSGPT 41   ASTSGOT 50*   ALKPHOSPHAT 98   TBILIRUBIN 0.7   GLUCOSE 85         No results for input(s): \"NTPROBNP\" in the last 72 hours.      Recent Labs     10/20/23  2045   TROPONINT 18       Imaging:  DX-CHEST-PORTABLE (1 VIEW)   Final Result      1.  Right basilar consolidation suspicious for pneumonitis.   2.  Mild enlargement of the cardiomediastinal silhouette.          X-Ray:  I have personally reviewed the images and compared with prior images.    Assessment/Plan:  Justification for Admission Status  I anticipate this patient will require at least two midnights for appropriate medical management, necessitating inpatient admission because hyponatremia    Patient will need a Intermediate Care (Adult and Pediatrics) bed on MEDICAL service .  The need is secondary to hyponatremia.    * Hyponatremia- (present on admission)  Assessment & Plan  Volume " status appears normo- to mildly hypervolemic  Sodium 118, asymptomatic.  Presume acute hyponatremia.  At baseline has normal sodium  Suspicions that her medication lisinopril/hydrochlorothiazide and/or hypothyroidism may be responsible  Admitted to IMCU for close monitoring  Check sodium every 4 hours.  Free water restriction.  Normal saline 50 cc/h  Obtain urine and serum osmolality, urine sodium      Pneumonia  Assessment & Plan  68-year-old female with history of morbid obesity, oxygen dependent COPD, CHF, sleep apnea presented with shortness of breath, cough for 2 weeks, worsening of hypoxia at home  Chest x-ray: Right lower lobe consolidation  COVID-19/RSV/influenza negative  Plan: Ceftriaxone, azithromycin  Obtain sputum culture as able  Wean oxygen down    Chronic respiratory failure with hypoxia (HCC)- (present on admission)  Assessment & Plan  On 4 L nasal cannula  We will plan to treat for pneumonia and do a walking test to assess true oxygen requirement  Pulse oximetry, incentive spirometry      Acquired hypothyroidism- (present on admission)  Assessment & Plan  TSH is within normal limits at 2.77  We will continue current dose of levothyroxine 50 mcg    COPD (chronic obstructive pulmonary disease) ([facility])  Assessment & Plan  On auscultation patient has decreased breath sound bilateraly consistent with emphysema, no wheezes at this time  Will order DuoNeb every 6 hours to help wean down oxygen  She was given 50 mg of prednisone in ER, will not continue      Morbid obesity with BMI of 45.0-49.9, adult ([facility])- (present on admission)  Assessment & Plan  BMI 50    Essential hypertension- (present on admission)  Assessment & Plan  Will hold Prinzide  We will start amlodipine instead        VTE prophylaxis: enoxaparin ppx    Time: 89 minutes

## 2023-10-21 NOTE — PROGRESS NOTES
Hospital Medicine Daily Progress Note    Date of Service  10/21/2023    Chief Complaint  Mallorie Xie is a 68 y.o. female admitted 10/20/2023 with hyponatremia.    Hospital Course  Ms. Xie is a 68 y.o. female with past medical history of COPD oxygen dependent on 4 L, CHF, hypothyroidism, sleep apnea, hypertension, GERD, who presented 10/20/2023 with complaints of dyspnea worsening on exertion, palpitations, occasional cough with white sputum, worsening of oxygen requirement.  In ER she is on 4 L of oxygen which is her baseline at rest.  Respirations are mildly elaborated, slightly hypertensive 168/77, afebrile.  COVID-19/influenza/RSV screen is negative.  Chemistry showed sodium 118, chloride 76.  CBC showed WBC 11.0, hemoglobin 11.7.  Troponin is not elevated.  Chest x-ray showed right basilar consolidation.  Mild cardiomegaly.  She was taking lisinopril/hydrochlorothiazide for blood pressure.  Dose of levothyroxine was increased from 50 to 100 mcg by her PCP a few weeks ago.  She was cutting in a half prescribed dose of levothyroxine and continue taking 50 mcg as she believed 100 mcg was making her feeling worse.  Oral intake of food and fluids has been decreased in the last 3 days.  She did not want to drink fluids because it was making her going to the bathroom Frequently.  In ER she was given Unasyn and azithromycin, as well as prednisone 60 mg once.       Interval Problem Update  10/21: Ms. Xie was evaluated and examined in the IMCU. She has been on NS at 50 mL/hour and her sodium remains severely low at 118 x 3 values. She confirms that she had not been drinking much water or fluids prior to coming in the hospital. Her condition is consistent with hypovolemic hyponatremia. IV NS will be increased to 125 mL/hour. Q4 hour Na checks.  Addendum at 16:00: Na went down to 117 and now 118 thus will change strategy to consider occult volume overload despite her story and lack of physical exam findings to not  reflect it. Stop IV fluids and start Lasix 40 mg IV bid with KCl.    I have discussed this patient's plan of care and discharge plan at IDT rounds today with Case Management, Nursing, Nursing leadership, and other members of the IDT team.    Consultants/Specialty  none    Code Status  Full Code    Disposition  The patient is not medically cleared for discharge to home or a post-acute facility.  Anticipate discharge to: skilled nursing facility    I have placed the appropriate orders for post-discharge needs.    Review of Systems  Review of Systems   Constitutional:  Negative for chills and fever.   Respiratory:  Negative for shortness of breath.    Cardiovascular:         Swelling feet   Gastrointestinal:  Negative for nausea and vomiting.   All other systems reviewed and are negative.       Physical Exam  Temp:  [36.2 °C (97.1 °F)-36.9 °C (98.4 °F)] 36.4 °C (97.5 °F)  Pulse:  [63-90] 74  Resp:  [14-28] 14  BP: (119-168)/(60-80) 122/66  SpO2:  [94 %-99 %] 96 %    Physical Exam  Vitals and nursing note reviewed.   Constitutional:       Appearance: She is obese. She is ill-appearing and toxic-appearing.   HENT:      Mouth/Throat:      Mouth: Mucous membranes are dry.   Eyes:      Conjunctiva/sclera: Conjunctivae normal.   Cardiovascular:      Rate and Rhythm: Normal rate and regular rhythm.   Pulmonary:      Effort: Pulmonary effort is normal.      Comments: Decreased air movement  No wheezing  Abdominal:      General: There is distension.      Tenderness: There is no abdominal tenderness.   Musculoskeletal:      Comments: Swelling feet   Skin:     General: Skin is dry.   Neurological:      General: No focal deficit present.      Mental Status: She is oriented to person, place, and time.   Psychiatric:         Mood and Affect: Mood normal.         Behavior: Behavior normal.         Thought Content: Thought content normal.         Judgment: Judgment normal.         Fluids    Intake/Output Summary (Last 24 hours) at  10/21/2023 0634  Last data filed at 10/21/2023 0100  Gross per 24 hour   Intake 350 ml   Output 380 ml   Net -30 ml       Laboratory  Recent Labs     10/20/23  2045 10/21/23  0238   WBC 11.0* 9.2   RBC 3.88* 4.01*   HEMOGLOBIN 11.7* 11.8*   HEMATOCRIT 33.5* 34.5*   MCV 86.3 86.0   MCH 30.2 29.4   MCHC 34.9 34.2   RDW 39.0 39.1   PLATELETCT 214 173   MPV 11.7 12.1     Recent Labs     10/20/23  2045 10/21/23  0238   SODIUM 118* 118*   POTASSIUM 4.2 4.3   CHLORIDE 76* 78*   CO2 30 28   GLUCOSE 85 112*   BUN 11 10   CREATININE 0.52 0.39*   CALCIUM 9.4 9.4                   Imaging  DX-CHEST-PORTABLE (1 VIEW)   Final Result      1.  Right basilar consolidation suspicious for pneumonitis.   2.  Mild enlargement of the cardiomediastinal silhouette.           Assessment/Plan  * Hyponatremia- (present on admission)  Assessment & Plan  Volume status appears hypovolemic  Sodium 118, 118, 118.    At baseline has normal sodium.  Likely exacerbated by hydrochlorothiazide which has been stopped  She may have concomitant SIADH in the setting of pneumonia  Admitted to IMCU for close monitoring  Check sodium levels q4 hours and increase normal saline from 50 mL/hour to 125 mL/hour.  Free water restriction.    Obtain urine and serum osmolality, urine sodium      Pneumonia  Assessment & Plan  68-year-old female with history of morbid obesity, oxygen dependent COPD, CHF, sleep apnea presented with shortness of breath, cough for 2 weeks, worsening of hypoxia at home  Chest x-ray: Right lower lobe consolidation  COVID-19/RSV/influenza negative  Plan: Ceftriaxone, azithromycin  Obtain sputum culture as able  Check a procalcitonin.       Chronic respiratory failure with hypoxia (HCC)- (present on admission)  Assessment & Plan  On 4 L nasal cannula  We will plan to treat for pneumonia and do a walking test to assess true oxygen requirement  Pulse oximetry, incentive spirometry      Acquired hypothyroidism- (present on admission)  Assessment  & Plan  TSH is within normal limits at 2.77  We will continue current dose of levothyroxine 50 mcg    COPD (chronic obstructive pulmonary disease) (HCC)  Assessment & Plan  Without exacerbation.  She was given 50 mg of prednisone in ER, will not continue      Morbid obesity with BMI of 45.0-49.9, adult (HCC)- (present on admission)  Assessment & Plan  BMI 50    Essential hypertension- (present on admission)  Assessment & Plan  Will hold Prinzide  We will start amlodipine instead         VTE prophylaxis:    enoxaparin ppx      I have performed a physical exam and reviewed and updated ROS and Plan today (10/21/2023). In review of yesterday's note (10/20/2023), there are no changes except as documented above.    Ms. Xie is critically ill. 35 minutes of critical care time were spent with patient, nursing, pharmacy and in specific management of severe hyponatremia with active titration of IV fluids with q4 hour sodium checks and q4 hour neuro checks in the IMCU. Please see orders.

## 2023-10-21 NOTE — ASSESSMENT & PLAN NOTE
On 4 L nasal cannula  We will plan to treat for pneumonia and do a walking test to assess true oxygen requirement  Pulse oximetry, incentive spirometry

## 2023-10-21 NOTE — CARE PLAN
Problem: Knowledge Deficit - Standard  Goal: Patient and family/care givers will demonstrate understanding of plan of care, disease process/condition, diagnostic tests and medications  Outcome: Progressing     Problem: Fall Risk  Goal: Patient will remain free from falls  Outcome: Progressing   The patient is Stable - Low risk of patient condition declining or worsening    Shift Goals  Clinical Goals: Monitor VS, labs to include sodium, decrease free water  Patient Goals: Rest  Family Goals: JORGITO    Progress made toward(s) clinical / shift goals:  pt verbalizes POC

## 2023-10-21 NOTE — ASSESSMENT & PLAN NOTE
Sodium was severely low at 118.    At baseline has normal sodium.  Likely exacerbated by hydrochlorothiazide which has been stopped  She may have concomitant SIADH in the setting of pneumonia  Admitted to IMCU for close monitoring  She described very low oral liquid intake thus was treated with IV NS for presumptive hypovolemic hyponatremia and her Na went down thus IV lasix 40 mg BID and restrict free water 1,800 mL/day.  Na went up to 129. On 10/23 we will liberalize her Lasix to oral twice a day and every 12 hour checks.

## 2023-10-21 NOTE — PROGRESS NOTES
IMCU Acceptance Note    Called by Dr. Kulwinder Ardon for admission to IMCU for acute hypoxic respiratory failure on 4.5 lpm NC due to right lower lobe pneumonia along with hyponatremia of 118.  Will admit to IMCU under the care of the hospitalist.      Marga Connors MD  Pulmonary and Critical Care Medicine

## 2023-10-21 NOTE — PROGRESS NOTES
Lab called with critical result of Na at 118. Critical lab result read back to lab.   Dr. Sal notified of critical lab result at 1556.  Critical lab result read back by Dr. Sal.

## 2023-10-21 NOTE — ED PROVIDER NOTES
CHIEF COMPLAINT  Chief Complaint   Patient presents with    Palpitations     Pt reports HR has felt ~1 week. (- thinners) (-) CP (+) dizziness with exertion.        LIMITATION TO HISTORY   Select: None    HPI    Mallorie Xie is a 68 y.o. female who presents to the Emergency Department presents for increasing shortness of breath and palpitations for the past week.  Patient does have a history of COPD is normally on 4 L.  She states over the past week, she has had increasing shortness of breath and every time she gets up and walks she feels like her heart rate is elevated and her oxygen drops.  She denies any fevers, does admit to increasing cough no increasing purulent sputum production.  Denies any chest pain orthopnea history of CHF.    OUTSIDE HISTORIAN(S):  Select: None available    EXTERNAL RECORDS REVIEWED  Select: Other reviewed patient's echo from 5/7/2018 ejection fraction was 65%      PAST MEDICAL HISTORY  Past Medical History:   Diagnosis Date    Abnormal TSH 4/7/2017    Anemia 8/9/2022    Bowel habit changes     Breast cancer (HCC)     2017    Breath shortness     Cancer (HCC)     Breast    Chickenpox     Colon polyps     2017    Congestive heart failure (HCC)     Dental disorder     Full upper/lower dentures.    Emphysema of lung (HCC)     Essential hypertension 03/31/2017    Heart burn     Indigestion     Mumps     Obesity     Sleep apnea     CPAP use.    Thyroid disease     Tobacco abuse      .    SURGICAL HISTORY  Past Surgical History:   Procedure Laterality Date    MASTECTOMY Right 10/24/2017    Procedure: MASTECTOMY- PARTIAL, WIRE LOCALIZED;  Surgeon: Marina Mansfield M.D.;  Location: SURGERY SAME DAY [facility];  Service: General    COLONOSCOPY           FAMILY HISTORY  Family History   Problem Relation Age of Onset    No Known Problems Mother     No Known Problems Father     Cancer Sister         cervical    Kidney cancer Sister     Lung Cancer Maternal Uncle     Melanoma Maternal Uncle      No Known Problems Maternal Grandmother     No Known Problems Maternal Grandfather     No Known Problems Paternal Grandmother     No Known Problems Paternal Grandfather     No Known Problems Daughter     Sleep Apnea Neg Hx           SOCIAL HISTORY  Social History     Socioeconomic History    Marital status:      Spouse name: Not on file    Number of children: Not on file    Years of education: Not on file    Highest education level: Bachelor's degree (e.g., BA, AB, BS)   Occupational History    Not on file   Tobacco Use    Smoking status: Former     Current packs/day: 0.00     Average packs/day: 1 pack/day for 47.0 years (47.0 ttl pk-yrs)     Types: Cigarettes     Start date: 1969     Quit date: 2016     Years since quittin.8    Smokeless tobacco: Never   Vaping Use    Vaping Use: Never used   Substance and Sexual Activity    Alcohol use: No     Alcohol/week: 0.0 oz    Drug use: No    Sexual activity: Not Currently   Other Topics Concern    Not on file   Social History Narrative    Not on file     Social Determinants of Health     Financial Resource Strain: Low Risk  (2023)    Overall Financial Resource Strain (CARDIA)     Difficulty of Paying Living Expenses: Not very hard   Food Insecurity: No Food Insecurity (2023)    Hunger Vital Sign     Worried About Running Out of Food in the Last Year: Never true     Ran Out of Food in the Last Year: Never true   Transportation Needs: No Transportation Needs (2023)    PRAPARE - Transportation     Lack of Transportation (Medical): No     Lack of Transportation (Non-Medical): No   Physical Activity: Inactive (2023)    Exercise Vital Sign     Days of Exercise per Week: 0 days     Minutes of Exercise per Session: 0 min   Stress: Stress Concern Present (2023)    [employer] - Occupational Stress Questionnaire     Feeling of Stress : To some extent   Social Connections: Socially Isolated (2023)    Social  "Connection and Isolation Panel [NHANES]     Frequency of Communication with Friends and Family: Never     Frequency of Social Gatherings with Friends and Family: Never     Attends Zoroastrianism Services: Never     Active Member of Clubs or Organizations: No     Attends Club or Organization Meetings: Never     Marital Status:    Intimate Partner Violence: Not on file   Housing Stability: Low Risk  (9/4/2023)    Housing Stability Vital Sign     Unable to Pay for Housing in the Last Year: No     Number of Places Lived in the Last Year: 1     Unstable Housing in the Last Year: No         CURRENT MEDICATIONS  No current facility-administered medications on file prior to encounter.     Current Outpatient Medications on File Prior to Encounter   Medication Sig Dispense Refill    levothyroxine (SYNTHROID) 50 MCG Tab Take 1 Tablet by mouth every morning on an empty stomach. 90 Tablet 3    albuterol 108 (90 Base) MCG/ACT Aero Soln inhalation aerosol Inhale 2 Puffs every 6 hours as needed for Shortness of Breath. 1 Each 11    nystatin (MYCOSTATIN) 633082 UNIT/GM Cream topical cream Apply 1 g topically 2 times a day. Prn rash 30 g 3    METAMUCIL FIBER PO Take  by mouth.      atorvastatin (LIPITOR) 40 MG Tab Take 1 Tablet by mouth every day. 90 Tablet 3    lisinopril-hydrochlorothiazide (PRINZIDE) 10-12.5 MG per tablet Take 1 Tablet by mouth every day. 90 Tablet 3    aspirin 81 MG tablet Take 81 mg by mouth every day.      Multiple Vitamins-Minerals (MULTIVITAMIN ADULT PO) Take  by mouth.             ALLERGIES  Allergies   Allergen Reactions    Bee Venom Anaphylaxis       PHYSICAL EXAM  VITAL SIGNS:BP (!) 147/75   Pulse 76   Temp 36.8 °C (98.2 °F) (Temporal)   Resp 20   Ht 1.626 m (5' 4\")   Wt (!) 130 kg (287 lb)   SpO2 95%   BMI 49.26 kg/m²       VITALS - vital signs documented prior to this note have been reviewed and noted,  GENERAL - awake, alert, oriented, GCS 15, no apparent distress, non-toxic  appearing  HEENT " - normocephalic, atraumatic, pupils equal, sclera anicteric, mucus  membranes moist  NECK - supple, no meningismus, full active range of motion, trachea midline  CARDIOVASCULAR - regular rate/rhythm, no murmurs/gallops/rubs  PULMONARY - no respiratory distress, speaking in full sentences, clear to  auscultation bilaterally, mild end expiratory wheezing decreased breath sounds in the lower fields GASTROINTESTINAL - soft, non-tender, non-distended, no rebound, guarding,  or peritonitis  GENITOURINARY - Deferred  NEUROLOGIC - Awake alert, normal mental status, speech fluid, cognition  normal, moves all extremities  MUSCULOSKELETAL - no obvious asymmetry or deformities present  EXTREMITIES - warm, well-perfused, no cyanosis or 1+ edema bilaterally  DERMATOLOGIC - warm, dry, no rashes, no jaundice  PSYCHIATRIC - normal affect, normal insight, normal concentration    DIAGNOSTIC STUDIES / PROCEDURES  EKG  I have independently interpreted this EKG  Interpreted below by myself as  Report   Date Value Ref Range Status   10/20/2023       [facility] Emergency Dept.    Test Date:  2023-10-20  Pt Name:    JEREMY MATHEW                   Department: ER  MRN:        [id number]                      Room:       ORTHO  Gender:     Female                       Technician: [id number]  :        [id number]                   Requested By:ANSELMO GRAFF  Order #:    [id number]                    Reading MD:    Measurements  Intervals                                Axis  Rate:       75                           P:          61  MA:         177                          QRS:        -50  QRSD:       103                          T:          57  QT:         352  QTc:        394    Interpretive Statements  Sinus rhythm  Multiple premature complexes, vent & supraven  Left anterior fascicular block  Abnormal R-wave progression, late transition  ST elevation, consider inferior injury  Compared to ECG 2018 08:00:56  ST (T wave)  deviation now present  Myocardial infarct finding now present  Ventricular premature complex(es) no longer present            No stemi    LABS  Labs Reviewed   CBC WITH DIFFERENTIAL - Abnormal; Notable for the following components:       Result Value    WBC 11.0 (*)     RBC 3.88 (*)     Hemoglobin 11.7 (*)     Hematocrit 33.5 (*)     Neutrophils-Polys 86.60 (*)     Lymphocytes 4.60 (*)     Neutrophils (Absolute) 9.51 (*)     Lymphs (Absolute) 0.51 (*)     Monos (Absolute) 0.88 (*)     All other components within normal limits    Narrative:     Biotin intake of greater than 5 mg per day may interfere with                  troponin levels, causing false low values.   COMP METABOLIC PANEL - Abnormal; Notable for the following components:    Sodium 118 (*)     Chloride 76 (*)     AST(SGOT) 50 (*)     All other components within normal limits    Narrative:     Biotin intake of greater than 5 mg per day may interfere with                  troponin levels, causing false low values.   TROPONIN    Narrative:     Biotin intake of greater than 5 mg per day may interfere with                  troponin levels, causing false low values.   ESTIMATED GFR    Narrative:     Biotin intake of greater than 5 mg per day may interfere with                  troponin levels, causing false low values.   COV-2, FLU A/B, AND RSV BY PCR ([facility])       Labs are remarkable for significant hyponatremia of 118 which is significantly decreased when compared to recent  RADIOLOGY  I have independently interpreted the diagnostic imaging associated with this visit and am waiting the final reading from the radiologist.   My preliminary interpretation is as follows: There is a right sided infiltrate concerning for pneumonia      Radiologist interpretation:   DX-CHEST-PORTABLE (1 VIEW)   Final Result      1.  Right basilar consolidation suspicious for pneumonitis.   2.  Mild enlargement of the cardiomediastinal silhouette.           COURSE & MEDICAL  DECISION MAKING    ED COURSE:    ED Observation Status    INTERVENTIONS BY ME:  Medications   ipratropium-albuterol (DUONEB) nebulizer solution (3 mL Nebulization Given 10/20/23 2137)   azithromycin (ZITHROMAX) 500 mg in D5W 250 mL IVPB premix (500 mg Intravenous New Bag 10/20/23 2158)   ampicillin/sulbactam (Unasyn) 3 g in  mL IVPB (has no administration in time range)   predniSONE (Deltasone) tablet 60 mg (60 mg Oral Given 10/20/23 2044)       Critical care    Critical Care Procedure Note    Total critical care time: Approximately 36 minutes    Upon my assess due to a high probability of clinically significant, life threatening deterioration, secondary to severe hyponatremia the patient required my direct attention and intervention. This critical care time included obtaining a history; examining the patient; pulse oximetry; ordering and review of studies; arranging urgent treatment with development of a management plan; evaluation of patient's response to treatment; frequent reassessment; and, discussions with other providers.    was exclusive of separately billable procedures and treating other patients and teaching time.    INITIAL ASSESSMENT, COURSE AND PLAN  Care Narrative: Patient presented for evaluation of palpitations dyspnea on exertion.  Multiple life-threatening diagnoses considered including but not limited to pneumonia COPD exacerbation, pleural effusion, atypical ACS CHF exacerbation among many other considerations.  On examination she did have some slight expiratory wheezing does have a history of COPD is on 3 to 4 L at baseline thus a DuoNeb prednisone, and azithromycin were ordered and labs were obtained.  Chest x-ray was remarkable for a right lower lobe infiltrate, concerning for underlying pneumonia antibiotics were escalated to Rocephin at this point.  Patient's only sirs criteria at this point is her tachypnea do not believe her to be septic thus a septic work-up was not initiated.   Troponin is negative EKG is nonischemic heart score is 3 doubt underlying ACS.  CMP did reveal a second hyponatremia of 118, she is otherwise asymptomatic of this thus sodium replacement was deferred.  Given the patient's pneumonia significant hyponatremia she will require admission.  Did speak with the intensivist who after discussion we agreed the patient is stable for IMCU spoke with the hospitalist who graciously agreed to accept patient in service she is admitted in serious condition for further care and evaluation of above.           ADDITIONAL PROBLEM LIST    DISPOSITION AND DISCUSSIONS  I have discussed management of the patient with the following physicians and NORMA's: Hospitalist, intensivist    Escalation of care considered, and ultimately not performed:IV fluids    Decision tools and prescription drugs considered including, but not limited to: Antibiotics   .    FINAL DIAGNOSIS  #1 pneumonia  2.  Hyponatremia  3.  Dyspnea  4.  Anemia         Electronically signed by: Kulwinder Jalloh DO ,10:55 PM 10/20/23

## 2023-10-21 NOTE — ED NOTES
"Pt repositioned in bed, provided with blankets, and had O2 tubing changed because it was \"too short.\" Pt reports discomfort and was assisted in shifting in bed. Pt states she does not want an IV in her R arm for comfort but refuses a second IV placed for a second time in the opposite arm. Pt reports that she is unhappy with wires and monitors and is requesting that they be removed. Reinforced importance of keeping medical equipment in place. Reinforced the limitations of certain aspects of the clinical setting. Pt reports that she is unhappy with her care. Pt encouraged to call using her call light for any further concerns.   "

## 2023-10-21 NOTE — ED TRIAGE NOTES
"Chief Complaint   Patient presents with    Palpitations     Pt reports HR has felt ~1 week. (- thinners) (-) CP (+) dizziness with exertion.    BP (!) 168/77   Pulse 82   Temp 36.9 °C (98.4 °F) (Temporal)   Resp (!) 24   Ht 1.626 m (5' 4\")   Wt (!) 130 kg (287 lb)   SpO2 97%   BMI 49.26 kg/m²       Pt without Hx of afib, pt not anticoagulated.   "

## 2023-10-21 NOTE — ED NOTES
Med rec completed per patient  Allergies reviewed  No PO Antibiotics in the last 30 days   No Anticoagulants in the last 30 days    Preferred Pharmacy: [facility]  Patient states she does not have a local pharmacy as her insurance will only cover mail order.

## 2023-10-21 NOTE — PROGRESS NOTES
Lab called with critical result of Na at 117. Critical lab result read back to lab.   Dr. Sal notified of critical lab result at 1154.  Critical lab result read via voalte by Dr. Sal.

## 2023-10-21 NOTE — CARE PLAN
The patient is Watcher - Medium risk of patient condition declining or worsening    Shift Goals  Clinical Goals: Monitor VS, labs to include sodium, decrease free water  Patient Goals: Rest  Family Goals: JORGITO    Progress made toward(s) clinical / shift goals:  Pt admitted from ER, Home oxygen 4L NC and on 4L here, admitted for pneumonia and hyponatremia, NS infusing as ordered, no distress noted at time

## 2023-10-21 NOTE — CARE PLAN
Problem: Bronchoconstriction  Goal: Improve in air movement and diminished wheezing  Description: Target End Date:  2 to 3 days    1.  Implement inhaled treatments  2.  Evaluate and manage medication effects  Outcome: Not Met       Respiratory Update    Treatment modality: Duo (MD ordered)  Frequency: Q6    Pt tolerating current treatments well with no adverse reactions.    Problem: Hyperinflation  Goal: Prevent or improve atelectasis  Description: Target End Date:  3 to 4 days    1. Instruct incentive spirometry usage  2.  Perform hyperinflation therapy as indicated  Outcome: Not Met       Respiratory Update    Treatment modality: PEP  Frequency: QID    Pt tolerating current treatments well with no adverse reactions.

## 2023-10-22 NOTE — CARE PLAN
Problem: Hyperinflation  Goal: Prevent or improve atelectasis  Description: Target End Date:  3 to 4 days    1. Instruct incentive spirometry usage  2.  Perform hyperinflation therapy as indicated  Outcome: Progressing       Respiratory Update    Treatment modality: PEP (1000)  Frequency: QID    Pt tolerating current treatments well with no adverse reactions.

## 2023-10-22 NOTE — DIETARY
NUTRITION SERVICES: BMI - Pt with BMI >40 (=Body mass index is 53.27 kg/m².), Class III obesity. Weight loss counseling not appropriate in acute care setting. RECOMMEND - If appropriate at DC please refer to outpatient nutrition services for weight management.

## 2023-10-22 NOTE — CARE PLAN
The patient is Watcher - Medium risk of patient condition declining or worsening    Shift Goals  Clinical Goals: Monitor VS, diuresis, comfort  Patient Goals: Rest and comfort  Family Goals: JORGITO    Progress made toward(s) clinical / shift goals:  VSS, sodium trending up at time see labs, on issues over night at this time      Problem: Psychosocial  Goal: Patient's level of anxiety will decrease  Outcome: Progressing Pt verbalized a decrease in anxiety level during shift

## 2023-10-22 NOTE — DISCHARGE PLANNING
Patients sister will be arriving tomorrow to do the POA,and has requested a mobile notary to complete the process

## 2023-10-22 NOTE — PROGRESS NOTES
Hospital Medicine Daily Progress Note    Date of Service  10/22/2023    Chief Complaint  Mallorie Xie is a 68 y.o. female admitted 10/20/2023 with hyponatremia.    Hospital Course  Ms. Xie is a 68 y.o. female with past medical history of COPD oxygen dependent on 4 L, CHF, hypothyroidism, sleep apnea, hypertension, GERD, who presented 10/20/2023 with complaints of dyspnea worsening on exertion, palpitations, occasional cough with white sputum, worsening of oxygen requirement.  In ER she is on 4 L of oxygen which is her baseline at rest.  Respirations are mildly elaborated, slightly hypertensive 168/77, afebrile.  COVID-19/influenza/RSV screen is negative.  Chemistry showed sodium 118, chloride 76.  CBC showed WBC 11.0, hemoglobin 11.7.  Troponin is not elevated.  Chest x-ray showed right basilar consolidation.  Mild cardiomegaly.  She was taking lisinopril/hydrochlorothiazide for blood pressure.  Dose of levothyroxine was increased from 50 to 100 mcg by her PCP a few weeks ago.  She was cutting in a half prescribed dose of levothyroxine and continue taking 50 mcg as she believed 100 mcg was making her feeling worse.  Oral intake of food and fluids has been decreased in the last 3 days.  She did not want to drink fluids because it was making her going to the bathroom Frequently.  In ER she was given Unasyn and azithromycin, as well as prednisone 60 mg once.       Interval Problem Update  10/21: Ms. Xie was evaluated and examined in the IMCU. She has been on NS at 50 mL/hour and her sodium remains severely low at 118 x 3 values. She confirms that she had not been drinking much water or fluids prior to coming in the hospital. Her condition is consistent with hypovolemic hyponatremia. IV NS will be increased to 125 mL/hour. Q4 hour Na checks.  Addendum at 16:00: Na went down to 117 and now 118 thus will change strategy to consider occult volume overload despite her story and lack of physical exam findings to not  reflect it. Stop IV fluids and start Lasix 40 mg IV bid with KCl.    10/22: Ms. Xie was evaluated and examined in the IMCU. She has been on IV lasix 40 mg BID with potassium and her Na has gone from 118-->121-->124-->125-->123. She will get up to chair today.     I have discussed this patient's plan of care and discharge plan at IDT rounds today with Case Management, Nursing, Nursing leadership, and other members of the IDT team.    Consultants/Specialty  none    Code Status  Full Code    Disposition  The patient is not medically cleared for discharge to home or a post-acute facility.  Anticipate discharge to: skilled nursing facility    I have placed the appropriate orders for post-discharge needs.    Review of Systems  Review of Systems   Constitutional:  Negative for chills and fever.        Generally weak   Respiratory:  Negative for shortness of breath.    Cardiovascular:         Swelling feet   Gastrointestinal:  Negative for nausea and vomiting.   All other systems reviewed and are negative.       Physical Exam  Temp:  [36 °C (96.8 °F)-36.9 °C (98.5 °F)] 36.3 °C (97.3 °F)  Pulse:  [62-83] 67  Resp:  [14-26] 14  BP: (103-138)/(51-72) 105/57  SpO2:  [87 %-99 %] 95 %    Physical Exam  Vitals and nursing note reviewed.   Constitutional:       Appearance: She is obese. She is ill-appearing and toxic-appearing.   HENT:      Mouth/Throat:      Mouth: Mucous membranes are dry.   Eyes:      Conjunctiva/sclera: Conjunctivae normal.   Cardiovascular:      Rate and Rhythm: Normal rate and regular rhythm.   Pulmonary:      Effort: Pulmonary effort is normal.      Comments: Moderate air movement  No wheezing  Abdominal:      General: There is no distension.      Tenderness: There is no abdominal tenderness.   Musculoskeletal:      Comments: Swelling feet   Skin:     General: Skin is dry.   Neurological:      General: No focal deficit present.      Mental Status: She is oriented to person, place, and time.   Psychiatric:          Mood and Affect: Mood normal.         Behavior: Behavior normal.         Thought Content: Thought content normal.         Judgment: Judgment normal.         Fluids    Intake/Output Summary (Last 24 hours) at 10/22/2023 0705  Last data filed at 10/22/2023 0600  Gross per 24 hour   Intake 1448.33 ml   Output 2050 ml   Net -601.67 ml         Laboratory  Recent Labs     10/20/23  2045 10/21/23  0238   WBC 11.0* 9.2   RBC 3.88* 4.01*   HEMOGLOBIN 11.7* 11.8*   HEMATOCRIT 33.5* 34.5*   MCV 86.3 86.0   MCH 30.2 29.4   MCHC 34.9 34.2   RDW 39.0 39.1   PLATELETCT 214 173   MPV 11.7 12.1       Recent Labs     10/20/23  2045 10/21/23  0238 10/21/23  0621 10/21/23  2212 10/22/23  0240 10/22/23  0607   SODIUM 118* 118*   < > 121* 124* 125*   POTASSIUM 4.2 4.3  --   --  3.9  --    CHLORIDE 76* 78*  --   --  84*  --    CO2 30 28  --   --  33  --    GLUCOSE 85 112*  --   --  104*  --    BUN 11 10  --   --  12  --    CREATININE 0.52 0.39*  --   --  0.58  --    CALCIUM 9.4 9.4  --   --  8.9  --     < > = values in this interval not displayed.                     Imaging  DX-CHEST-PORTABLE (1 VIEW)   Final Result      1.  Right basilar consolidation suspicious for pneumonitis.   2.  Mild enlargement of the cardiomediastinal silhouette.           Assessment/Plan  * Hyponatremia- (present on admission)  Assessment & Plan  Sodium was severely low at 118.    At baseline has normal sodium.  Likely exacerbated by hydrochlorothiazide which has been stopped  She may have concomitant SIADH in the setting of pneumonia  Admitted to [facility] for close monitoring  She described very low oral liquid intake thus was treated with IV NS for presumptive hypovolemic hyponatremia and her Na went down thus IV lasix 40 mg BID and restrict free water.  Na went up to 125 now 123.  q6 hour checks.      Pneumonia  Assessment & Plan  68-year-old female with history of morbid obesity, oxygen dependent COPD, CHF, sleep apnea presented with shortness of breath,  cough for 2 weeks, worsening of hypoxia at home  Chest x-ray: Right lower lobe consolidation  COVID-19/RSV/influenza negative  Plan: Ceftriaxone, azithromycin   Serum procalcitonin mildly elevated at 0.3.       Chronic respiratory failure with hypoxia (HCC)- (present on admission)  Assessment & Plan  On 4 L nasal cannula  We will plan to treat for pneumonia and do a walking test to assess true oxygen requirement  Pulse oximetry, incentive spirometry      Acquired hypothyroidism- (present on admission)  Assessment & Plan  TSH is within normal limits at 2.77  We will continue current dose of levothyroxine 50 mcg    COPD (chronic obstructive pulmonary disease) (HCC)  Assessment & Plan  Without exacerbation.  She was given 50 mg of prednisone in ER, will not continue      Morbid obesity with BMI of 45.0-49.9, adult (HCC)- (present on admission)  Assessment & Plan  BMI 50    Essential hypertension- (present on admission)  Assessment & Plan  Will hold Prinzide  We will start amlodipine instead         VTE prophylaxis:    enoxaparin ppx      I have performed a physical exam and reviewed and updated ROS and Plan today (10/22/2023). In review of yesterday's note (10/21/2023), there are no changes except as documented above.    Ms. Xie is critically ill. 32 minutes of critical care time were spent with patient, nursing, pharmacy and in specific management of severe hyponatremia with active titration of IV fluids with every 6 hour sodium checks with neuro checks in the IMCU and frequent adjustments based on Na levels. Please see orders.

## 2023-10-22 NOTE — CARE PLAN
The patient is Stable - Low risk of patient condition declining or worsening    Shift Goals  Clinical Goals: Monitor VS, diuresis, comfort  Patient Goals: Rest and comfort  Family Goals: JORGITO    Progress made toward(s) clinical / shift goals:  education done on POC, all questions and concerns were addressed    Patient is not progressing towards the following goals:      Problem: Knowledge Deficit - Standard  Goal: Patient and family/care givers will demonstrate understanding of plan of care, disease process/condition, diagnostic tests and medications  Outcome: Progressing     Problem: Psychosocial  Goal: Patient's level of anxiety will decrease  Outcome: Progressing

## 2023-10-23 NOTE — CARE PLAN
Problem: Bronchoconstriction  Goal: Improve in air movement and diminished wheezing  Description: Target End Date:  2 to 3 days    1.  Implement inhaled treatments  2.  Evaluate and manage medication effects  Outcome: Progressing     Problem: Hyperinflation  Goal: Prevent or improve atelectasis  Description: Target End Date:  3 to 4 days    1. Instruct incentive spirometry usage  2.  Perform hyperinflation therapy as indicated  Outcome: Progressing     PEP BID    Albuterol MDI Q4 PRN

## 2023-10-23 NOTE — CARE PLAN
The patient is Stable - Low risk of patient condition declining or worsening    Shift Goals  Clinical Goals: Monitor VS, Diuresis, promote movement  Patient Goals: Rest and comfort  Family Goals: JORGITO    Progress made toward(s) clinical / shift goals:  VSS during shift, pt up to bathroom with minimal assist       Problem: Fall Risk  Goal: Patient will remain free from falls  Outcome: Progressing     Problem: Mobility  Goal: Patient's capacity to carry out activities will improve, Up to Bathroom with minimal assist, aprox 10 feet, bears weight good, mildly unsteady   Outcome: Progressing

## 2023-10-23 NOTE — THERAPY
Physical Therapy   Initial Evaluation     Patient Name: Mallorie Xie  Age:  68 y.o., Sex:  female  Medical Record #: [id number]  Today's Date: 10/23/2023     Precautions  Precautions: Fall Risk    Assessment  Patient is 68 y.o. female who presented 10/20/2023 with complaints of dyspnea worsening on exertion, palpitations, occasional cough with white sputum, worsening of oxygen requirement.    Found to have hyponatremia, PNA     PMH: COPD oxygen dependent on 4 L, CHF, hypothyroidism, sleep apnea, hypertension, GERD, morbid obesity.     Patient seen for PT evaluation. Was in bed, agreeable to participate. Patient able to demonstrate functional mobility tasks as detailed below. Patient currently limited due to elevated HR, drop in SpO2 and fatigue with mobility. Currently recommending post-acute placement. However, anticipate, patient will improve with her activity tolerance/endurance and be able to return home with HH PT.     Plan    Physical Therapy Initial Treatment Plan   Treatment Plan : Gait Training, Therapeutic Activities, Therapeutic Exercise, Stair Training  Treatment Frequency: 3 Times per Week  Duration: Until Therapy Goals Met    DC Equipment Recommendations: Unable to determine at this time  Discharge Recommendations: Recommend post-acute placement for additional physical therapy services prior to discharge home (If patient improves with her activity tolerance and able to ambulate farther distances, would be able to return home with HH PT.)     Objective     10/23/23 1254   Charge Group   PT Evaluation PT Evaluation Mod   PT Self Care / Home Evaluation (Units) 1   Total Time Spent   PT Evaluation Time Spent (Mins) 15   PT Self Care/Home Evaluation Time Spent (Mins) 15   PT Total Time Spent (Calculated) 30   Initial Contact Note    Initial Contact Note Order Received and Verified, Physical Therapy Evaluation in Progress with Full Report to Follow.   Precautions   Precautions Fall Risk   Vitals   Pulse (!)  107   Patient BP Position Sitting  (Edge of bed; Post ambulation)   Pulse Oximetry 90 %   O2 (LPM) 4   O2 Delivery Device Nasal Cannula   Vitals Comments Following rest break & PLB, HR improved to 84, SpO2 to 94 within 2-3 minutes.   Pain   Pain Scales 0 to 10 Scale    Intervention Declines   Prior Living Situation   Prior Services Home-Independent   Housing / Facility 1 Story House   Steps Into Home 3   Steps In Home 0   Rail Both Rail (Steps into Home)   Equipment Owned Oxygen   Lives with - Patient's Self Care Capacity Alone and Able to Care For Self   Prior Level of Functional Mobility   Bed Mobility Independent   Transfer Status Independent   Ambulation Independent   Ambulation Distance Household   Assistive Devices Used None   Stairs Independent   Comments Patient reported that she is having increased difficulty to manage her IADLs at home. Especially housekeeping, laundary.   History of Falls   History of Falls No   Cognition    Level of Consciousness Alert   Passive ROM Lower Body   Passive ROM Lower Body WDL   Active ROM Lower Body    Active ROM Lower Body  WDL   Strength Lower Body   Lower Body Strength  WDL   Other Treatments   Other Treatments Provided Patient educated about pursed lip breathing, activity pacing, seated rest breaks in b/w tasks. Reinforced importance of daily mobility with nursing and OOB to chair for meals. Discussed DC recommendations, HH vs placement, patient unable to decide at this time. Patient expressed concern about her HR elevating and her SpO2 dropping with minimal activity like walking to & from the bathroom.   Balance Assessment   Sitting Balance (Static) Fair +   Sitting Balance (Dynamic) Fair +   Standing Balance (Static) Fair   Standing Balance (Dynamic) Fair   Comments Standing without AD   Bed Mobility    Supine to Sit Supervised   Sit to Supine Supervised   Scooting Supervised   Comments HOB slightly elevated   Gait Analysis   Gait Level Of Assist Standby Assist    Assistive Device None   Distance (Feet) 40   Deviation No deviation   Comments Patient ambulated in the room with steady gait, no loss of balance. SpO2 dropped to 88 during ambulation. Distance of ambulation limited due to fatigue.   Functional Mobility   Sit to Stand Supervised   Bed, Chair, Wheelchair Transfer Refused   How much difficulty does the patient currently have...   Turning over in bed (including adjusting bedclothes, sheets and blankets)? 4   Sitting down on and standing up from a chair with arms (e.g., wheelchair, bedside commode, etc.) 4   Moving from lying on back to sitting on the side of the bed? 4   How much help from another person does the patient currently need...   Moving to and from a bed to a chair (including a wheelchair)? 3   Need to walk in a hospital room? 3   Climbing 3-5 steps with a railing? 3   6 clicks Mobility Score 21   Activity Tolerance   Sitting Edge of Bed < 25 minutes   Patient / Family Goals    Patient / Family Goal #1 To return home   Short Term Goals    Short Term Goal # 1 Patient will perform chair transfers with supervision in 6 visits   Short Term Goal # 2 Patient will ambulate in the hallway > 100 feet with supervision in 6 visits   Short Term Goal # 3 Patient will negotiate 3 steps with B/L rails with supervision in 6 visits   Education Group   Education Provided Role of Physical Therapist   Role of Physical Therapist Patient Response Patient;Acceptance;Explanation;Verbal Demonstration   Physical Therapy Initial Treatment Plan    Treatment Plan  Gait Training;Therapeutic Activities;Therapeutic Exercise;Stair Training   Treatment Frequency 3 Times per Week   Duration Until Therapy Goals Met   Problem List    Problems Impaired Ambulation;Decreased Activity Tolerance   Anticipated Discharge Equipment and Recommendations   DC Equipment Recommendations Unable to determine at this time   Discharge Recommendations Recommend post-acute placement for additional physical  therapy services prior to discharge home  (If patient improves with her activity tolerance and able to ambulate farther distances, would be able to return home with  PT.)   Interdisciplinary Plan of Care Collaboration   IDT Collaboration with  Nursing   Patient Position at End of Therapy Seated;In Bed;Call Light within Reach;Tray Table within Reach   Session Information   Date / Session Number  10/23-1(1/3, 10/29)

## 2023-10-23 NOTE — DISCHARGE PLANNING
Case Management Discharge Planning    Admission Date: 10/20/2023  GMLOS: 2.9  ALOS: 3    6-Clicks ADL Score: 18  6-Clicks Mobility Score: 21      Anticipated Discharge Dispo: Discharge Disposition: D/T to SNF with Medicare cert in anticipation of skilled care (03)    DME Needed: no    Action(s) Taken: ACP booklet with mobile notary phone numbers given to pt.  Pt also requested resources regarding the homemaker program.  Resources for the [facility] given.     Escalations Completed: None    Medically Clear: No    Next Steps: RNCM to continue to follow up with pt and medical team to address dc needs and barriers      Barriers to Discharge: Medical clearance

## 2023-10-23 NOTE — RESPIRATORY CARE
COPD EDUCATION by COPD CLINICAL EDUCATOR  10/23/2023  at  2:59 PM by Kathie Gaston RRT     Patient interviewed by COPD education team.  Patient unable to participate in full program.  A short intervention has been conducted.  A comprehensive packet including information about COPD, types of treatments to manage their disease and safe home Oxygen usage was provided and reviewed with patient at the bedside.  Patient given a spacer with instruction.           COPD Screen  COPD Risk Screening  Do you have a history of COPD?: Yes  Do you have a Pulmonologist?: No  COPD Population Screener  During the past 4 weeks, how much did you feel short of breath?: Some of the time  Do you ever cough up any mucus or phlegm?: Yes, a few days a week or month  In the past 12 months, you do less than you used to because of your breathing problems: Disagree/unsure  Have you smoked at least 100 cigarettes in your entire life?: Yes  How old are you?: 60+  COPD Screening Score: 6  COPD Coordinator Not Recommended: Yes    COPD Assessment  COPD Clinical Specialists ONLY  COPD Education Initiated: Yes--Short Intervention (Given COPD booklet, spacer, has flutter. Pt states she does not take Dulera due to cost of medication.)  Is this a COPD exacerbation patient?: No  DME Company: [employer] Equipment Type: O2 @ 4 L  Physician Follow Up Appointment: 02/07/24  Appt Time: 0720  Physician Name: Cheryl M. Demucha, A.P.RGENET  Pulmonologist Name: YEN ReisPLauraRGENET  Pulmonary Medicine    (last seen in 2018, declined new referral)  Referrals Initiated:  (quit smoking in 2016, pending SNF upon d/c)  $ Demo/Eval of SVN's, MDI's and Aerosols: Yes (reviewed meds, given spacer)  (OP) Pulmonary Function Testing: Yes (5/8/218: FEV1 1.21 L(53%), ratio 72%)  Interdisciplinary Rounds: Attendance at Rounds (30 Min)    PFT Results    5/8/218: FEV1 1.21 L(53%), ratio 72%    Meds to Beds  Would the patient like to opt in for Bedside Medication Delivery  "at Discharge?: Yes, interested     MY COPD ACTION PLAN     It is recommended that patients and physicians /healthcare providers complete this action plan together. This plan should be discussed at each physician visit and updated as needed.    The green, yellow and red zones show groups of symptoms of COPD. This list of symptoms is not comprehensive, and you may experience other symptoms. In the \"Actions\" column, your healthcare provider has recommended actions for you to take based on your symptoms.    Patient Name: Mallorie Xie   YOB: 1955   Last Updated on:     Green Zone:  I am doing well today Actions     Usual activitiy and exercise level   Take daily medications     Usual amounts of cough and phlegm/mucus   Use oxygen as prescribed     Sleep well at night   Continue regular exercise/diet plan     Appetite is good   At all times avoid cigarette smoke, inhaled irritants     Daily Medications (these medications are taken every day):                Yellow Zone:  I am having a bad day or a COPD flare Actions     More breathless than usual   Continue daily medications     I have less energy for my daily activities   Use quick relief inhaler as ordered     Increased or thicker phlegm/mucus   Use oxygen as prescribed     Using quick relief inhaler/nebulizer more often   Get plenty of rest     Swelling of ankles more than usual   Use pursed lip breathing     More coughing than usual   At all times avoid cigarette smoke, inhaled irritants     I feel like I have a \"chest cold\"     Poor sleep and my symptoms woke me up     My appetite is not good     My medicine is not helping      Call provider immediately if symptoms don’t improve     Continue daily medications, add rescue medications:   Albuterol 2 Puffs Every 6 hours PRN       Medications to be used during a flare up, (as Discussed with Provider):           Additional Information:  Use the spacer with your rescue inhaler.     Red Zone:  I need " urgent medical care Actions     Severe shortness of breath even at rest   Call 911 or seek medical care immediately     Not able to do any activity because of breathing      Fever or shaking chills      Feeling confused or very drowsy       Chest pains      Coughing up blood

## 2023-10-24 NOTE — DISCHARGE SUMMARY
Discharge Summary    CHIEF COMPLAINT ON ADMISSION  Chief Complaint   Patient presents with    Palpitations     Pt reports HR has felt ~1 week. (- thinners) (-) CP (+) dizziness with exertion.        Reason for Admission  Hyponatremia     Admission Date  10/20/2023    CODE STATUS  Prior    HPI & HOSPITAL COURSE  This is a 68 y.o. female with past medical history of COPD oxygen dependent on 4 L, CHF, hypothyroidism, sleep apnea, hypertension, GERD, who presented  10/20/2023 with complaints of dyspnea worsening on exertion, palpitations, occasional cough with white sputum, worsening of oxygen requirement.  In ER she is on 4 L of oxygen which is her baseline at rest. COVID-19/influenza/RSV screen is negative.  She is noted to have severe hyponatremia with sodium 118.  Of note, patient was taking lisinopril/hydrochlorothiazide for blood pressure at home, which was discontinued due to hyponatremia.  Patient received IV Lasix and free water restriction.  Hypernatremia slowly improving.  Day of discharge, sodium level was 136.     Patient's x-ray noted right lower lobe consolidation.  Negative COVID/RSV/influenza.  Patient was given IV antibiotics.  Patient has completed antibiotics course prior to discharge.    Regarding hypertension, her home medication losartan/HCTZ was discontinued.  She was started amlodipine.  Amlodipine has been sent to her home pharmacy.    PT/OT evaluated patient and recommended postacute placement.  However patient refused going to skilled nursing facility.  Home health care has been arranged prior to discharge.     Therefore, she is discharged in good and stable condition to home with organized home healthcare and close outpatient follow-up.    The patient met 2-midnight criteria for an inpatient stay at the time of discharge.    Discharge Date  10/24/2023    FOLLOW UP ITEMS POST DISCHARGE  PCP in one week    DISCHARGE DIAGNOSES  Principal Problem:    Hyponatremia (POA: Yes)  Active Problems:     Essential hypertension (POA: Yes)    Morbid obesity with BMI of 45.0-49.9, adult (HCC) (POA: Yes)    COPD (chronic obstructive pulmonary disease) (HCC) (POA: Unknown)    Acquired hypothyroidism (POA: Yes)    Chronic respiratory failure with hypoxia (HCC) (POA: Yes)    Pneumonia (POA: Unknown)  Resolved Problems:    * No resolved hospital problems. *      FOLLOW UP  Future Appointments   Date Time Provider Department Center   10/31/2023  5:00 PM Carrie Etienne M.D. [facility]   2/7/2024  7:20 AM Cheryl M. Demucha, A.PLauraRGENET [facility]   9/10/2024  7:40 AM Cheryl M. Demucha, A.P.R.LADARIUS [facility] [place] Cheryl M. Demucha, A.P.R.NLaura  [address] [place] [id number] [phone]    Follow up  Please call your primary care provider to schedule a hospital follow up. Thank you.      MEDICATIONS ON DISCHARGE     Medication List        START taking these medications        Instructions   amLODIPine 5 MG Tabs  Commonly known as: Norvasc   Take 1 Tablet by mouth every day.  Dose: 5 mg            CONTINUE taking these medications        Instructions   albuterol 108 (90 Base) MCG/ACT Aers inhalation aerosol   Doctor's comments: Give albuterol that is patient or insurance preference please- pt will notify you when she needs this rx  Inhale 2 Puffs every 6 hours as needed for Shortness of Breath.  Dose: 2 Puff     aspirin 81 MG tablet   Take 81 mg by mouth every evening.  Dose: 81 mg     atorvastatin 40 MG Tabs  Commonly known as: Lipitor   TAKE 1 TABLET DAILY  Dose: 40 mg     levothyroxine 50 MCG Tabs  Commonly known as: Synthroid   Doctor's comments: PT FELT BETTER ON 50 MCG  Take 1 Tablet by mouth every morning on an empty stomach.  Dose: 50 mcg     METAMUCIL FIBER PO   Take 1 Dose by mouth every day.  Dose: 1 Dose     MULTIVITAMIN ADULT PO   Take 1 Tablet by mouth every day.  Dose: 1 Tablet            STOP taking these medications      lisinopril-hydrochlorothiazide 10-12.5 MG per tablet  Commonly known as: Prinzide               Allergies  Allergies   Allergen Reactions    Bee Venom Anaphylaxis       DIET  No orders of the defined types were placed in this encounter.      ACTIVITY  As tolerated.  Weight bearing as tolerated    CONSULTATIONS  na    PROCEDURES  na    LABORATORY  Lab Results   Component Value Date    SODIUM 136 10/24/2023    POTASSIUM 4.6 10/24/2023    CHLORIDE 91 (L) 10/24/2023    CO2 35 (H) 10/24/2023    GLUCOSE 128 (H) 10/24/2023    BUN 15 10/24/2023    CREATININE 0.71 10/24/2023        Lab Results   Component Value Date    WBC 9.2 10/21/2023    HEMOGLOBIN 11.8 (L) 10/21/2023    HEMATOCRIT 34.5 (L) 10/21/2023    PLATELETCT 173 10/21/2023      DX-CHEST-PORTABLE (1 VIEW)   Final Result      1.  Right basilar consolidation suspicious for pneumonitis.   2.  Mild enlargement of the cardiomediastinal silhouette.          Total time of the discharge process 33 minutes.

## 2023-10-24 NOTE — CARE PLAN
Problem: Knowledge Deficit - Standard  Goal: Patient and family/care givers will demonstrate understanding of plan of care, disease process/condition, diagnostic tests and medications  Outcome: Progressing     Problem: Psychosocial  Goal: Patient's level of anxiety will decrease  Outcome: Progressing  Goal: Patient's ability to verbalize feelings about condition will improve  Outcome: Progressing     Problem: Discharge Barriers/Planning  Goal: Patient's continuum of care needs are met  Outcome: Progressing     Problem: Respiratory  Goal: Patient will achieve/maintain optimum respiratory ventilation and gas exchange  Outcome: Progressing     Problem: Fluid Volume  Goal: Fluid volume balance will be maintained  Outcome: Progressing     Problem: Mobility  Goal: Patient's capacity to carry out activities will improve  Outcome: Progressing     Problem: Fall Risk  Goal: Patient will remain free from falls  Outcome: Progressing     Problem: Pain - Standard  Goal: Alleviation of pain or a reduction in pain to the patient’s comfort goal  Outcome: Progressing   The patient is Stable - Low risk of patient condition declining or worsening    Shift Goals  Clinical Goals: Rest  Patient Goals: Sleep  Family Goals: JORGITO    Progress made toward(s) clinical / shift goals:  Patient resting in bed, bed is locked and in lowest position, call bell within reach of patient, patient is calm and compliant with care    Patient is not progressing towards the following goals:

## 2023-10-24 NOTE — DISCHARGE PLANNING
Case Management Discharge Planning    Admission Date: 10/20/2023  GMLOS: 2.9  ALOS: 4    6-Clicks ADL Score: 18  6-Clicks Mobility Score: 21      Anticipated Discharge Dispo: Discharge Disposition: Discharged to home/self care (01)  Discharge Address: [address] [facility] [address]    DME Needed: No    Action(s) Taken: Choice obtained and Referral(s) sent    1207 pt has been accepted by [facility] services. Pt in room with sister and notary signing ACP documents.  RN CM will follow up with pt for transportation needs.     1240 pt's sister will drive her home. Pt requested a walker. Choice form completed for [facility] from [facility] continuity of care and faxed to SANDRA.  SANDRA Segal scanned POA documentation.      Escalations Completed: None    Medically Clear: Yes    Next Steps: RNCM to continue to follow up with pt and medical team to address dc needs and barriers      Barriers to Discharge: Transportation

## 2023-10-24 NOTE — DISCHARGE PLANNING
RN SHERICE met with pt at the bedside. Pt states she lives alone in a mobile home in [place], [place] with 3 steps to enter. Pt is independent with ADLs; still drives when needed. Pt has a O2 concentrator at home with [employer]. She has a portable concentrator to go home with. Pt was given resources for the homemaker program per her request.  Pt states she has no family support in he area.  Pt has an appointment with a notary to elect Corrie as POA.  Pt denies SA and MH issues. She reports monthly income to be around $2000.  Pt refuses SNF at this time. She is agreeable with  services. Choice obtained for [employer] [facility].  Miriam, from [employer] notified that pt might need a  to facilitate additional resources.     Care Transition Team Assessment    Information Source  Orientation Level: Oriented X4  Information Given By: Patient  Who is responsible for making decisions for patient? : Patient    Readmission Evaluation  Is this a readmission?: No    Elopement Risk  Legal Hold: No  Ambulatory or Self Mobile in Wheelchair: Yes  Disoriented: No  Psychiatric Symptoms: None  History of Wandering: No  Elopement this Admit: No  Vocalizing Wanting to Leave: No  Displays Behaviors, Body Language Wanting to Leave: No-Not at Risk for Elopement  Elopement Risk: Not at Risk for Elopement    Interdisciplinary Discharge Planning  Lives with - Patient's Self Care Capacity: Alone and Able to Care For Self  Patient or legal guardian wants to designate a caregiver: No  Support Systems: None  Housing / Facility: [address] [place] [address]  Prior Services: Home-Independent  Durable Medical Equipment: Home Oxygen    Discharge Preparedness  What is your plan after discharge?: Home health care  What are your discharge supports?: Other (comment) (none)  Prior Functional Level: Independent with Activities of Daily Living  Difficulity with ADLs: None    Functional Assesment  Prior Functional Level: Independent with Activities of Daily  Living     Vision / Hearing Impairment  Vision Impairment : No  Right Eye Vision: Impaired, Wears Glasses  Left Eye Vision: Impaired, Wears Glasses  Hearing Impairment : No     Advance Directive  Advance Directive?: None  Advance Directive offered?: AD Booklet given    Domestic Abuse  Have you ever been the victim of abuse or violence?: No  Physical Abuse or Sexual Abuse: No  Verbal Abuse or Emotional Abuse: No  Possible Abuse/Neglect Reported to:: Not Applicable    Psychological Assessment  History of Substance Abuse: None  History of Psychiatric Problems: No    Discharge Risks or Barriers  Discharge risks or barriers?: Transportation, Lives alone, no community support    Anticipated Discharge Information  Discharge Disposition: Discharged to home/self care (01)  Discharge Address: [address] [facility] [address]

## 2023-10-24 NOTE — CARE PLAN
Problem: Hyperinflation  Goal: Prevent or improve atelectasis  Description: Target End Date:  3 to 4 days    1. Instruct incentive spirometry usage  2.  Perform hyperinflation therapy as indicated  Outcome: Progressing       Respiratory Update    Treatment modality: PEP  Frequency: BID  IS 1000  Pt tolerating current treatments well with no adverse reactions.

## 2023-10-24 NOTE — DISCHARGE INSTRUCTIONS
Discharge Instructions per Payton Fisher M.D.    Please follow-up with PCP as outpatient in one week  I have discontinued your home blood pressure med Prinzide due to hyponatremia. I have sent new Bp med Amlodipine to [facility] with one refill. Please follow up with PCP for hypertension and refill needs    Return to ER in the event of new or worsening symptoms. Please note importance of compliance and the patient has agreed to proceed with all medical recommendations and follow up plan indicated above. All medications come with benefits and risks. Risks may include permanent injury or death and these risks can be minimized with close reassessment and monitoring. Please make it to your scheduled follow ups with PCP

## 2023-10-24 NOTE — THERAPY
"Occupational Therapy   Initial Evaluation     Patient Name: Mallorie Xie  Age:  68 y.o., Sex:  female  Medical Record #: [id number]  Today's Date: 10/24/2023     Precautions  Precautions: Fall Risk       10/24/23 0856   Interdisciplinary Plan of Care Collaboration   Collaboration Comments pt refused, stating that she was just upself to the bathroom.  asked if she had any difficulty getting on/off the low toilet- pt said \"no\".  currently sitting up in chair stating that since she already has been up, shes not going to do any thing else this morning.  Will attempt eval again as able.         "

## 2023-10-24 NOTE — DISCHARGE PLANNING
Referral sent to [facility]    1120- Referral sent per choice to [facility].    1140- Spoke To: Lila  Agency/Facility Name: [facility]  Plan or Request: Lila called to accept pt for a week or two of strengthening at their new SNF.

## 2023-10-25 NOTE — DOCUMENTATION QUERY
[facility]                                                                       Query Response Note      PATIENT:               JEREMY MATHEW  ACCT #:                  [id number]  MRN:                     [id number]  :                      1955  ADMIT DATE:       10/20/2023 7:36 PM  DISCH DATE:        10/24/2023 4:39 PM  RESPONDING  PROVIDER #:        [id number]           QUERY TEXT:    Acute hypoxic respiratory failure is documented in the CC note dated 10/20. Chronic respiratory failure with hypoxia is documented in the H&P and HM progress notes.     Can the acuity of this respiratory failure be clarified?      The patient's Clinical Indicators include:  67yo F presented with increasing SOB and palpitations. Hx COPD, O2 dependent at 4L baseline at home.     10/20 ED:  -- No respiratory distress, speaking in full sentences, mild end expiratory wheezing decreased breath sounds in lower fields  -- Presented for dyspnea on exertion     10/20 H&P:  -- Positive for cough, sputum production and shortness of breath... Tachypnea present...  Decreased breath sounds present.  -- Chronic respiratory failure with hypoxia    10/20 CC note:  -- Admission to IMCU for acute hypoxic respiratory failure on 4.5 lpm NC due to right lower lobe pneumonia    10/20 VS: RR 18-26, SpO2 95-98% 4-4.5L     Risk Factors: RLL PNA, emphysema, morbid obesity, hx CHF   Treatment: O2 support, DuoNeb, s/p prednisone 60mg in the ED    Please contact me for any questions.    Thank you for time and attention,  [email or web address]  Contact via [employer]  Options provided:   -- Acute on chronic respiratory failure with hypoxia   -- Chronic respiratory failure with hypoxia, acute component ruled out   -- Other explanation, (Please specify other explanation)      Query created by: Carmen Harrell on 10/23/2023 8:16 AM    RESPONSE TEXT:    Acute on chronic  respiratory failure with hypoxia          Electronically signed by:  JOSE WAHL MD 10/24/2023 5:21 PM

## 2023-10-30 NOTE — TELEPHONE ENCOUNTER
Requested Prescriptions     Pending Prescriptions Disp Refills    amLODIPine (NORVASC) 5 MG Tab 30 Tablet 1     Sig: Take 1 Tablet by mouth every day.      Pt is requesting 90 day supply

## 2023-10-31 PROBLEM — Z09 HOSPITAL DISCHARGE FOLLOW-UP: Status: ACTIVE | Noted: 2023-01-01

## 2023-11-01 NOTE — PROGRESS NOTES
Subjective:   Mallorie Xie is a 68 y.o. female here today for evaluation and management of:     Hospital discharge follow-up  Patient was admitted   10/20/2023 - 10/24/2023 (4 days)  [facility]  For dyspnea, cough, had severe hyponatermia 118, she was on a saline drip, sodium normalized, treated for pneumonia, lisinopril/hctz was stopped, amlodipine 5mg started   bp at home is controlled on this 120s/70s    Oxygen 95% on 4L  HR is 76-85  When she exerts her self her oxygen drops to 83 and HR goes up to 112.   Deconditioned walking only 2 min at a time.   Continue with home health and PT for gradual reconditioning.    Will complete course of azithromycin outpatient.                 Current medicines (including changes today)  Current Outpatient Medications   Medication Sig Dispense Refill    amLODIPine (NORVASC) 5 MG Tab Take 1 Tablet by mouth every day. 90 Tablet 3    azithromycin (ZITHROMAX) 250 MG Tab Take two tablets by mouth day one and one tablet by mouth day 2-5 6 Tablet 0    sennosides-docusate sodium (SENOKOT-S) 8.6-50 MG tablet Take 1 Tablet by mouth every day. 90 Tablet 3    levothyroxine (SYNTHROID) 50 MCG Tab Take 1 Tablet by mouth every morning on an empty stomach. 90 Tablet 3    atorvastatin (LIPITOR) 40 MG Tab TAKE 1 TABLET DAILY 90 Tablet 3    albuterol 108 (90 Base) MCG/ACT Aero Soln inhalation aerosol Inhale 2 Puffs every 6 hours as needed for Shortness of Breath. 1 Each 11    METAMUCIL FIBER PO Take 1 Dose by mouth every day.      aspirin 81 MG tablet Take 81 mg by mouth every evening.      Multiple Vitamins-Minerals (MULTIVITAMIN ADULT PO) Take 1 Tablet by mouth every day.       No current facility-administered medications for this visit.     She  has a past medical history of Abnormal TSH (4/7/2017), Anemia (8/9/2022), Bowel habit changes, Breast cancer (HCC), Breath shortness, Cancer (HCC), Chickenpox, Colon polyps, Congestive heart failure (HCC), Dental disorder,  "Emphysema of lung (HCC), Essential hypertension (03/31/2017), Heart burn, Indigestion, Mumps, Obesity, Sleep apnea, Thyroid disease, and Tobacco abuse.    She has no past medical history of Anxiety, Arrhythmia, Asthma, Blood transfusion without reported diagnosis, Clotting disorder ([facility]), Depression, Diabetes ([facility]), GERD (gastroesophageal reflux disease), Heart attack ([facility]), Heart murmur, HIV (human immunodeficiency virus infection) ([facility]), Hyperlipidemia, Kidney disease, Migraine, Seizure (HCC), or Stroke ([facility]).    ROS  No chest pain, no shortness of breath, no abdominal pain       Objective:     Ht 1.575 m (5' 2\")   Wt (!) 129 kg (284 lb)  Body mass index is 51.94 kg/m².   Physical Exam:  Constitutional: Alert, no distress, well-groomed.  Skin: No rashes in visible areas.  Eye: Round. Conjunctiva clear, lids normal. No icterus.   ENMT: Lips pink without lesions, good dentition, moist mucous membranes. Phonation normal.  Neck: No masses, no thyromegaly. Moves freely without pain.  Respiratory: Unlabored respiratory effort, no cough or audible wheeze  Psych: Alert and oriented x3, normal affect and mood.          Assessment and Plan:   The following treatment plan was discussed    1. Essential hypertension  - amLODIPine (NORVASC) 5 MG Tab; Take 1 Tablet by mouth every day.  Dispense: 90 Tablet; Refill: 3    2. Hospital discharge follow-up    3. Acquired hypothyroidism  - levothyroxine (SYNTHROID) 50 MCG Tab; Take 1 Tablet by mouth every morning on an empty stomach.  Dispense: 90 Tablet; Refill: 3    Other orders  - azithromycin (ZITHROMAX) 250 MG Tab; Take two tablets by mouth day one and one tablet by mouth day 2-5  Dispense: 6 Tablet; Refill: 0  - sennosides-docusate sodium (SENOKOT-S) 8.6-50 MG tablet; Take 1 Tablet by mouth every day.  Dispense: 90 Tablet; Refill: 3      Followup: No follow-ups on file.  This evaluation was conducted via Zoom using secure and encrypted videoconferencing technology. The patient " was in their home in the [facility].    The patient's identity was confirmed and verbal consent was obtained for this virtual visit.

## 2023-11-01 NOTE — ASSESSMENT & PLAN NOTE
Patient was admitted   10/20/2023 - 10/24/2023 (4 days)  [facility]  For dyspnea, cough, had severe hyponatermia 118, she was on a saline drip, sodium normalized, treated for pneumonia, lisinopril/hctz was stopped, amlodipine 5mg started   bp at home is controlled on this 120s/70s    Oxygen 95% on 4L  HR is 76-85  When she exerts her self her oxygen drops to 83 and HR goes up to 112.   Deconditioned walking only 2 min at a time.   Continue with home health and PT for gradual reconditioning.    Will complete course of azithromycin outpatient.

## 2023-11-28 NOTE — TELEPHONE ENCOUNTER
Requested Prescriptions     Pending Prescriptions Disp Refills    amLODIPine (NORVASC) 5 MG Tab 90 Tablet 3     Sig: Take 1 Tablet by mouth every day.      Last office visit: 10/31/23  Last lab: 10/24/23

## 2023-12-04 NOTE — TELEPHONE ENCOUNTER
Requested Prescriptions     Pending Prescriptions Disp Refills    amLODIPine (NORVASC) 5 MG Tab 90 Tablet 0     Sig: Take 1 Tablet by mouth every day.    atorvastatin (LIPITOR) 40 MG Tab 90 Tablet 3     Sig: Take 1 Tablet by mouth every day.    azithromycin (ZITHROMAX) 250 MG Tab 6 Tablet 0     Sig: Take two tablets by mouth day one and one tablet by mouth day 2-5    levothyroxine (SYNTHROID) 50 MCG Tab 90 Tablet 3     Sig: Take 1 Tablet by mouth every morning on an empty stomach.    albuterol 108 (90 Base) MCG/ACT Aero Soln inhalation aerosol 1 Each 11     Sig: Inhale 2 Puffs every 6 hours as needed for Shortness of Breath.      Pharmacy change

## 2023-12-09 PROBLEM — I48.91 ATRIAL FIBRILLATION WITH RVR (HCC): Status: ACTIVE | Noted: 2023-01-01

## 2023-12-10 PROBLEM — R74.01 TRANSAMINITIS: Status: ACTIVE | Noted: 2023-01-01

## 2023-12-10 PROBLEM — J96.21 ACUTE ON CHRONIC RESPIRATORY FAILURE WITH HYPOXIA AND HYPERCAPNIA (HCC): Status: ACTIVE | Noted: 2018-05-08

## 2023-12-10 PROBLEM — J44.1 CHRONIC OBSTRUCTIVE PULMONARY DISEASE WITH ACUTE EXACERBATION (HCC): Status: ACTIVE | Noted: 2017-04-07

## 2023-12-10 PROBLEM — R57.9 SHOCK (HCC): Status: ACTIVE | Noted: 2023-01-01

## 2023-12-10 PROBLEM — E83.52 HYPERCALCEMIA: Status: ACTIVE | Noted: 2023-01-01

## 2023-12-10 PROBLEM — I46.9 CARDIAC ARREST (HCC): Status: ACTIVE | Noted: 2023-01-01

## 2023-12-10 PROBLEM — J81.0 ACUTE PULMONARY EDEMA (HCC): Status: ACTIVE | Noted: 2023-01-01

## 2023-12-10 PROBLEM — J96.22 ACUTE ON CHRONIC RESPIRATORY FAILURE WITH HYPOXIA AND HYPERCAPNIA (HCC): Status: ACTIVE | Noted: 2018-05-08

## 2023-12-10 PROBLEM — J90 PLEURAL EFFUSION ON RIGHT: Status: ACTIVE | Noted: 2023-01-01

## 2023-12-10 PROBLEM — G93.41 ACUTE METABOLIC ENCEPHALOPATHY: Status: ACTIVE | Noted: 2023-01-01

## 2023-12-10 NOTE — PROGRESS NOTES
0345 set up for bronchoscopy  0346 per MD okay to use central line    0347 time out all agree  0348 bronch begin  0350 increase dex to 0.5  per MD at bedside  0352 ET tube moved to 24 at teeth with MD and RT  0354 bronch end    0355 set up for art-line  0356 time out all agree  0359 art line begin  0402 guide wire out, art-line connected and reading  0404 art-line sutured in place  0407 art-line end

## 2023-12-10 NOTE — ASSESSMENT & PLAN NOTE
Possible exacerbation   Solumederol 125 mg x 1, continue with systemic steroids   Xopenex/ipratropium nebs   Oxygen per protocol   Check viral COVID 19/Flu PCR

## 2023-12-10 NOTE — ASSESSMENT & PLAN NOTE
Continue systemic steroids with scheduled and as needed bronchodilators  RT protocol  Outpatient pulmonary follow-up and rehab

## 2023-12-10 NOTE — ASSESSMENT & PLAN NOTE
Contributing to chronic respiratory failure and cardiac disease  Encourage behavioral and dietary modification for weight loss when appropriate  RD consultation to optimize nutrition

## 2023-12-10 NOTE — ASSESSMENT & PLAN NOTE
Likely cardiac in nature  Echocardiogram  Begin forced diuresis with strict ins/outs with goal -1 to 2 L in the next 24 hours

## 2023-12-10 NOTE — CODE DOCUMENTATION
Presented to [facility] for neuro changes, last known well 0127  [place] stroke activated at 0203  [facility] 12

## 2023-12-10 NOTE — PROCEDURES
Procedure Note    Date: 12/10/2023  Time: 0400    Procedure: Bronchoscopy with bronchoalveolar lavage and therapeutic aspiration of secretions from bronchi    Indication: Worsening atelectasis/PNA  Consent: Informed consent obtained from patient or designated decision maker after explaining the benefits/risks of the procedure including but not limited to bleeding, infection, airway trauma or loss therof, pneumothorax/hemothorax, arrythmia, or death. Patient or surrogate expressed understanding and agreement.    Time-out: Verbal consent was obtained. Immediately prior to procedure, a time out was called to verify the correct patient, procedure, equipment, support staff and site/side marked as required. Pre-procedure pain reported as n/a and post-procedure was n/a.    Procedure: After obtaining consent, a time-out was performed. Respiratory therapy and nursing at bedside throughout procedure. Patient provided sedation and analgesia throughout the procedure. Placed on full ventilator support with an FiO2 of 100% throughout the procedure. Using a fiberoptic bronchoscope, trachea entered via 8.0 ETT.  0 mL of local anesthetic sprayed at the prachi (2% lidocaine) achieving appropriate comfort level for patient. Airways visualized directly and the following intervention was performed: diagnostic and therapeutic lavage with all areas of lungs suctioned to clear. Findings as below. Patient tolerated procedure well without any difficulties and left in care of bedside nurse/RT.     Medications: versed 3 mg, precedex gtt  Findings: Upper airway - Not visualized as bronchoscope passed through ETT.        Trachea to prachi - normal appearing mucosa without lesions or mass, ETT tip measured 1 cm from the prachi --> pulled back by 2 cm        R proximal and distal airways - edematous, friable appearing mucosa leading into the RLL with possible external compression, secretions: thick, bloody, moderate        L proximal and distal  airways - normal appearing mucosa without mass/lesion/anatomic variance, secretions: thin, bloody, small        Samples - RLL BAL of bronchi    Complications: none  CXR (if applicable): slight improved aeration of the RLL    Jeremy Gonda, MD  Critical Care Medicine

## 2023-12-10 NOTE — PROGRESS NOTES
0311 Patient arrived to ICU with Rapid team APRN and Intensivist MD Gonda.    Patient abdomen noted to be more distended post code per Rapid team.    0316 prepping for art-line and central placement with MD Gonda

## 2023-12-10 NOTE — PROGRESS NOTES
0150 rapid response called for change in mentation and GCS 3. Crash cart in room, rapid response team to bedside. Pt able to be aroused with rapid response, pt given Narcan per hospitalist order. Noted (L) side deficits-code stroke called, pt taken off unit to main CT scanner. Code blue called at main CT. ROSC obtained, pt transferred to RICU 106. No handoff required, RICU RN to call if need report.   RN signed off as primary.

## 2023-12-10 NOTE — ED PROVIDER NOTES
Procedure note:    Intubation    Date/Time: 12/10/2023 3:12 AM    Performed by: Mitchel Vazquez M.D.  Authorized by: Mitchel Vazquez M.D.    Consent:     Consent obtained:  Emergent situation  Pre-procedure details:     Patient status:  Unresponsive    Mallampati score:  II    Induction agents:  Etomidate    Paralytics:  Succinylcholine  Procedure details:     Preoxygenation:  Bag valve mask    CPR in progress: yes      Number of attempts:  2  Successful intubation attempt details:     Intubation method:  Oral    Laryngoscope blade:  Mac 3    Tube size (mm): 7.5.    Tube type:  Cuffed    Tube visualized through cords: yes    Placement assessment:     ETT at teeth/gumline (cm):  22    Tube secured with:  ETT jessica    Breath sounds:  Equal and absent over the epigastrium    Placement verification: chest rise, CXR verification, direct visualization and equal breath sounds      CXR findings:  ETT in proper place  Post-procedure details:     Procedure completion:  Tolerated well, no immediate complications    Cardiac Arrest:  Cardiopulmonary Resuscitation Procedure Note  Indication: Cardiac arrest  Consent: Unable to be obtained due to the emergent nature of this procedure.  Procedure: The patient was placed in the supine position and the appropriate landmarks were identified. CPR was initiated at an appropriate rate and depth. The patient was placed on a Code Cart monitor and set to monitor.    I either participated in CPR or directed and supervised CPR during the code process.  Complications: None  Patient had 3 rounds of CPR done.  Patient received 2 doses of epinephrine    Electronically signed by: Mitchel Vazquez M.D., 12/10/2023 3:15 AM

## 2023-12-10 NOTE — ASSESSMENT & PLAN NOTE
Presumed new onset although a brief problem list mention of A-fib in 2017  S/p electrical cardioversion to normal sinus rhythm given instability of arrhythmia 12/10  Continue amiodarone infusion status post bolus  Optimize electrolytes  Continuous telemetry monitoring  Heparin drip  Echocardiogram

## 2023-12-10 NOTE — CARE PLAN
The patient is Unstable - High likelihood or risk of patient condition declining or worsening    Shift Goals  Clinical Goals: Hemodynamic stability  Patient Goals: JORGITO  Family Goals: JORGITO    Progress made toward(s) clinical / shift goals:      Problem: Pain - Standard  Goal: Alleviation of pain or a reduction in pain to the patient’s comfort goal  Outcome: Progressing     Problem: Skin Integrity  Goal: Skin integrity is maintained or improved  Outcome: Progressing     Problem: Fall Risk  Goal: Patient will remain free from falls  Outcome: Progressing     Problem: Impaired Gas Exchange  Goal: Patient will demonstrate improved ventilation and adequate oxygenation and participate in treatment regimen within the level of ability/situation.  Outcome: Progressing     Problem: Safety - Medical Restraint  Goal: Remains free of injury from restraints (Restraint for Interference with Medical Device)  Outcome: Progressing       Patient is not progressing towards the following goals:      Problem: Safety - Medical Restraint  Goal: Free from restraint(s) (Restraint for Interference with Medical Device)  Outcome: Not Met

## 2023-12-10 NOTE — PROGRESS NOTES
Critical Care Progress Note    Date of admission  12/9/2023    Chief Complaint  68 y.o. female who presented 12/9/2023 with a past medical history significant for morbid obesity, congestive heart failure, obstructive sleep apnea, hypertension who was brought in by EMS from home for inability to care for herself at home with diffuse weakness, leg swelling, shortness of breath despite her 4 L/min nasal cannula which she uses at home.  She was found to be in atrial fibrillation with rapid ventricular response with a heart rate in the 160s which apparently was new for her.  She was admitted to the hospital in October of this year for pneumonia and hyponatremia and discharged home with home health.  Patient was given 400 cc of fluid and 5 of IV metoprolol x 3 doses with oral loading eventually bring her heart rate down to 110.  She was given Unasyn and azithromycin for presumed community-acquired pneumonia and admitted to the hospitalist services.  She was to be started on a heparin drip and was started on Solu-Medrol and bronchodilators for presumed COPD exacerbation.  Early this morning, patient was initially alert and oriented x 4 with increasing oxygen requirements but at 0 150 a rapid response was called as she became altered and had left-sided weakness.  A code stroke was called and she was taken down to the CT scan for imaging.  Unfortunately when they laid her flat she went agonal and went into a PEA arrest.  A supraglottic airway was placed and she was given 2 mg of epinephrine and underwent 3 rounds of CPR before return of spontaneous circulation.  Postarrest, she remains in atrial fibrillation with rapid ventricular response and hypotension and was started on a norepinephrine drip.  A head CT without contrast was performed at the request of neurology which did not show any acute process and a CTA of her head and neck will be canceled for now.  She was loaded with IV amiodarone and started on a drip and  "transferred to the intensive care unit after electrical cardioversion for worsening shock with A-fib with RVR. \" Gonda H+P    Hospital Course  No notes on file    Interval Problem Update  Reviewed last 24 hour events:  Admitted ICU overnight, PEA arrest last night  [place] to related to respiratory causes    Fulminant shock this AM, recurrent AF, rapidly titrating pressors this AM    POCUS with severely depressed LV fxn with global dyskinesis, RV hard to visualize, IVC large and plethoric but confounded by inumerable masses in liver and heterogenous echotexture, PA looks markedly dilated, ?thrombus/echogenicity in PA but definitely not conclusive    Chart reviewed, Gonda's note and interventions reviewed.  Patient examined    Hep gtt  H2RA  MP 40 q 6    Review of Systems  Review of Systems   Unable to perform ROS: Critical illness        Vital Signs for last 24 hours   Temp:  [36.4 °C (97.5 °F)-37.2 °C (99 °F)] 37.2 °C (99 °F)  Pulse:  [] 108  Resp:  [11-71] 11  BP: ()/() 85/51  SpO2:  [79 %-97 %] 92 %    Hemodynamic parameters for last 24 hours       Respiratory Information for the last 24 hours  Vent Mode: APVCMV  Rate (breaths/min): 30  Vt Target (mL): 320  PEEP/CPAP: 10  MAP: 16  Control VTE (exp VT): 354    Physical Exam   Physical Exam  Vitals and nursing note reviewed.   Constitutional:       General: She is in acute distress.      Appearance: Normal appearance. She is well-developed. She is morbidly obese. She is ill-appearing.      Interventions: She is sedated, intubated and restrained.   HENT:      Head: Normocephalic and atraumatic.      Nose: Nose normal. No congestion.      Mouth/Throat:      Mouth: Mucous membranes are dry.      Pharynx: Oropharynx is clear.      Comments: Endotracheal tube and gastric tube in place  Eyes:      General: No scleral icterus.     Conjunctiva/sclera: Conjunctivae normal.      Pupils: Pupils are equal, round, and reactive to light.      Comments: Pupils are " 2 mm and reactive bilaterally   Neck:      Vascular: No JVD.      Trachea: No tracheal deviation.   Cardiovascular:      Rate and Rhythm: Tachycardia present. Rhythm irregularly irregular. Frequent Extrasystoles are present.     Chest Wall: PMI is displaced.      Pulses: Decreased pulses.      Heart sounds: Heart sounds are distant. No murmur heard.     Comments: In A-fib with RVR on telemetry monitoring, hypotensive requiring vasopressor support  Pulmonary:      Effort: Tachypnea and respiratory distress present. She is intubated.      Breath sounds: Examination of the right-middle field reveals decreased breath sounds and rales. Examination of the left-middle field reveals rales. Examination of the right-lower field reveals decreased breath sounds, rhonchi and rales. Examination of the left-lower field reveals rhonchi and rales. Decreased breath sounds, rhonchi and rales present. No wheezing.      Comments: High peak pressures in the mid 30s, moderate secretions  Abdominal:      General: Bowel sounds are normal. There is distension.      Palpations: Abdomen is soft.      Tenderness: There is no abdominal tenderness. There is no guarding or rebound.      Comments: Tympanitic   Genitourinary:     Comments: External female catheter in place  Musculoskeletal:         General: No tenderness.      Cervical back: Neck supple. No tenderness.      Right lower leg: Edema present.      Left lower leg: Edema present.   Skin:     General: Skin is warm and dry.      Capillary Refill: Capillary refill takes 2 to 3 seconds.      Coloration: Skin is pale.      Findings: No rash.   Neurological:      Mental Status: She is lethargic.      Comments: She is moving nonpurposefully on her right side spontaneously for me this AM  But not opening eyes, no gaze preferences    Not responding purposefully to commands         Medications  Current Facility-Administered Medications   Medication Dose Route Frequency Provider Last Rate Last Admin     Respiratory Therapy Consult   Nebulization Continuous RT Jeremy M Gonda, M.D.        lactulose 20 GM/30ML solution 30 mL  30 mL Oral TID Jeremy M Gonda, M.D.   30 mL at 12/10/23 1712    acetaminophen (Tylenol) tablet 1,000 mg  1,000 mg Oral Q6HRS PRN Rene Merritt M.D.   1,000 mg at 12/10/23 1417    MD ALERT...adult comfort care   Other PRN Rene Merritt M.D.        morphine 10 mg/mL injection 5 mg  5 mg Intravenous Q30 MIN PRN Rene Merritt M.D.        morphine 10 mg/mL injection 10 mg  10 mg Intravenous Q30 MIN PRN Rene Merritt M.D.        LORazepam (Ativan) 2 MG/ML oral conc 1 mg  1 mg Sublingual Q HOUR PRN Rene Merritt M.D.        Or    diazePAM (Valium) injection 10 mg  10 mg Intravenous Q2HRS PRN Rene Merritt M.D.           Fluids    Intake/Output Summary (Last 24 hours) at 12/10/2023 1912  Last data filed at 12/10/2023 1830  Gross per 24 hour   Intake 1619.31 ml   Output 60 ml   Net 1559.31 ml       Laboratory  Recent Labs     12/10/23  0210 12/10/23  0437 12/10/23  1202   XGOTL05N 7.20*  --   --    KMLPUZ598E 101.4*  --   --    JZOSK433E 72.6  --   --    JJCO0UIF 87.0*  --   --    ARTHCO3 38*  --   --    M3VUWYPNM 3 liters  --   --    ARTBE 7*  --   --    ISTATAPH  --  7.207* 7.340*   ISTATAPCO2  --  99.2* 60.7*   ISTATAPO2  --  108* 72   ISTATATCO2  --  42* 35*   MEDPONN9QML  --  96 93   ISTATARTHCO3  --  39.4* 32.8*   ISTATARTBE  --  8* 5*   ISTATTEMP  --  95.2 F 102.7 F   ISTATFIO2  --  100 80   ISTATSPEC  --  Arterial Arterial   ISTATAPHTC  --  7.232* 7.308*   HTHRHZQI2NJ  --  97* 83         Recent Labs     12/09/23  2144 12/10/23  0205 12/10/23  0430 12/10/23  1035   SODIUM 143 145  --  143   POTASSIUM 4.0 4.2  --  4.4   CHLORIDE 97 98  --  96   CO2 34* 38*  --  27   BUN 44* 45*  --  51*   CREATININE 0.78 0.85  --  1.33   MAGNESIUM  --   --  2.6* 2.8*   PHOSPHORUS  --   --  7.6* 5.9*   CALCIUM 11.1* 10.4  --  10.0     Recent Labs     12/09/23  2144 12/10/23  0205  12/10/23  1035   ALTSGPT 173* 169* 446*   ASTSGOT 203* 213* 505*   ALKPHOSPHAT 865* 882* 899*   TBILIRUBIN 1.7* 1.9* 2.4*   LIPASE 62  --   --    GLUCOSE 115* 124* 213*     Recent Labs     12/09/23  2144 12/10/23  0205 12/10/23  1035   WBC 15.3* 16.8*  --    NEUTSPOLYS 87.80*  --   --    LYMPHOCYTES 4.30*  --   --    MONOCYTES 7.00  --   --    EOSINOPHILS 0.00  --   --    BASOPHILS 0.00  --   --    ASTSGOT 203* 213* 505*   ALTSGPT 173* 169* 446*   ALKPHOSPHAT 865* 882* 899*   TBILIRUBIN 1.7* 1.9* 2.4*     Recent Labs     12/09/23  2144 12/10/23  0205   RBC 4.15* 4.08*   HEMOGLOBIN 12.3 12.0   HEMATOCRIT 39.5 39.5   PLATELETCT 166 177   PROTHROMBTM 15.5* 15.4*   APTT 27.0 27.3   INR 1.21* 1.21*       Imaging  X-Ray:  I have personally reviewed the images and compared with prior images.  EKG:  I have personally reviewed the images and compared with prior images.  CT:    Reviewed    Assessment/Plan  * Atrial fibrillation with RVR (HCC)- (present on admission)  Assessment & Plan  Presumed new onset although a brief problem list mention of A-fib in 2017  S/p electrical cardioversion to normal sinus rhythm given instability of arrhythmia 12/10  Continue amiodarone infusion status post bolus  Optimize electrolytes  Continuous telemetry monitoring  Heparin drip  Echocardiogram    Hypercalcemia  Assessment & Plan  Unclear etiology, possibly malignancy  Monitor with diuresis    Shock (HCC)  Assessment & Plan  Postcardiac arrest with depressed biventricular function on POCUS  Multifactorial  Unstable patient not safe for transport to CT, but needs PE study and additional imaging, PE certainly on the differential, ut not a great candidate for empiric lytics based on preceding event with suggestion of acute ischemic stroke with significant uncertainty given LV dysfunction and pulmonary edema    Continue empiric heparin gtt in meantime  Continue to titrate norepinephrine drip to maintain a mean arterial pressure greater than  65  Add vasopressin  Monitor urine output, vital signs, lactic acid closely for adequacy of resuscitation      Acute pulmonary edema (HCC)  Assessment & Plan  Likely cardiac in nature  Echocardiogram  Begin forced diuresis with strict ins/outs with goal -1 to 2 L in the next 24 hours    Acute metabolic encephalopathy  Assessment & Plan  Likely hypercapnic induced, code stroke called on 12/10  Neurology consulted  Limit sedatives with close neurologic monitoring  Correct underlying causes  Potential need for MRI or EEG if no appropriate awakening soon    Cardiac arrest (HCC)  Assessment & Plan  Likely respiratory arrest/hypercapnia leading to PEA s/p ROSC after approximately 6 to 7 minutes of CPR  Continue supportive care, continuous telemetry monitoring  Close neurologic monitoring  Avoid fevers  Echocardiogram, optimize pulmonary function    Transaminitis  Assessment & Plan  Unclear etiology, masses seen on ultrasound  Will likely need CT chest/abdomen/pelvis for further evaluation and potential biopsy  Avoid hepatotoxins and monitor    Pleural effusion on right  Assessment & Plan  Uncertain etiology  but in combination with liver masses, and history of lung CA, possible recurrence  Overall picture concerning for malignancy with hyperCA, liver masses, and failure, shock and pleural effusion      Need drainage and cytology/cxs  Will clarify GOC prior to invasive procedure and chest tube  Pending Chest CT, too unstable for trasnport    Acute on chronic respiratory failure with hypoxia and hypercapnia (HCC)- (present on admission)  Assessment & Plan  Intubated after cardiac arrest 12/10  Continue full mechanical ventilatory support with high mandatory minute ventilation and PEEP  Titrate FiO2 to goal SpO2 greater than 92%  Follow-up on postintubation arterial blood gas  Diagnostic and therapeutic bronchoscopy  Forced diuresis  RT/O2 protocol  Precedex drip with as needed fentanyl for sedation/analgesia  ABCDEF  bundle  Continue empiric antibiotics for possible pneumonia, follow-up on cultures    LUIS (obstructive sleep apnea)- (present on admission)  Assessment & Plan  Nocturnal CPAP once extubated    Acquired hypothyroidism- (present on admission)  Assessment & Plan  Continue Synthroid    Chronic obstructive pulmonary disease with acute exacerbation (HCC)- (present on admission)  Assessment & Plan  Continue systemic steroids with scheduled and as needed bronchodilators  RT protocol  Outpatient pulmonary follow-up and rehab    Morbid obesity with BMI of 45.0-49.9, adult (HCC)- (present on admission)  Assessment & Plan  Contributing to chronic respiratory failure and cardiac disease  Encourage behavioral and dietary modification for weight loss when appropriate  RD consultation to optimize nutrition    Essential hypertension- (present on admission)  Assessment & Plan  Currently in shock  Hold antihypertensives and monitor         VTE:  Heparin  Ulcer: H2 Antagonist  Lines: Central Line  Ongoing indication addressed, Arterial Line  Ongoing indication addressed, and [place] Catheter  Ongoing indication addressed    I have performed a physical exam and reviewed and updated ROS and Plan today (12/10/2023). In review of yesterday's note (12/9/2023), there are no changes except as documented above.     Discussed patient condition and risk of morbidity and/or mortality with Family, RN, RT, Therapies, and Pharmacy  The patient remains critically ill.  Critical care time = 70 minutes in directly providing and coordinating critical care and extensive data review.  No time overlap and excludes procedures.

## 2023-12-10 NOTE — PROGRESS NOTES
4 Eyes Skin Assessment Completed by Monika Crews, JASMINA and Keyshawn Madden RN.    Head WDL  Ears Redness and Blanching  Nose WDL  Mouth dry flaky skin, cracking skin  Neck WDL  Breast/Chest Redness, Non-Blanching, Excoriation, Weeping, and Edema  Shoulder Blades Redness and Blanching  Spine Redness and Blanching, scar  (R) Arm/Elbow/Hand Redness, Blanching, and Edema  (L) Arm/Elbow/Hand Redness, Blanching, Bruising, and Edema  Abdomen Redness, excoriation under panus  Groin Redness, Excoriation, and Swelling  Scrotum/Coccyx/Buttocks Redness, Non-Blanching, Excoriation, and Discoloration  (R) Leg Redness, Blanching, and Edema  (L) Leg Redness, Blanching, and Edema  (R) Heel/Foot/Toe Redness, Boggy, Swelling, and Edema  (L) Heel/Foot/Toe Redness, Boggy, and Edema          Devices In Places Tele Box, Blood Pressure Cuff, Pulse Ox, and Nasal Cannula      Interventions In Place Gray Ear Foams, NC W/Ear Foams, Waffle Overlay, TAP System, Q2 Turns, and Barrier Cream    Possible Skin Injury Yes    Pictures Uploaded Into Epic Yes  Wound Consult Placed Yes  RN Wound Prevention Protocol Ordered Yes    Left foot     Right foot     Left panus area     Left breast     Right panus area    Sacrum-bruising, nonblanchable area, severe discoloration      Right breast

## 2023-12-10 NOTE — CARE PLAN
Problem: Ventilation  Goal: Ability to achieve and maintain unassisted ventilation or tolerate decreased levels of ventilator support  Description: Target End Date:  4 days     Document on Vent flowsheet    1.  Support and monitor invasive and noninvasive mechanical ventilation  2.  Monitor ventilator weaning response  3.  Perform ventilator associated pneumonia prevention interventions  4.  Manage ventilation therapy by monitoring diagnostic test results  Outcome: Not Met   30/320/10/90

## 2023-12-10 NOTE — H&P
"Hospital Medicine History & Physical Note    Date of Service  12/9/2023    Primary Care Physician  Cheryl M. Demucha, CAMERON.    Consultants  none    Code Status  Full Code    Chief Complaint  Chief Complaint   Patient presents with    Weakness     Failure to thrive, unable to take care of self at home. Home health seeing patient at home. -falls. States weakness is in her legs bilaterally, pt states she is unable to walk now.     Shortness of Breath     Pt states \"its been a long time\" arrives on 4L NC to which is pts home O2. Hx COPD.    Arrhythmia     Pt presents in Afib RVR up to 160s. EKG complete, pt states she takes no medication for Afib.       History of Presenting Illness  Mallorie Xie is a 68 y.o. female past medical history of COPD, chronic respiratory failure, sleep apnea, hypertension, morbid obesity who presented 12/9/2023 with failure to thrive and weakness.  Additionally complains of shortness of breath, denies any chest pain.  Does have bilateral lower extremity edema.  Patient reports recently hospitalized in October for right lower lobe pneumonia completed antibiotics.    In the ER, she was noted to be in atrial fibrillation with RVR given IV metoprolol and p.o. metoprolol with improvement in heart rate.  IV fluids were given for rehydration.  Currently remains in A-fib hospitalized and hospitalized telemetry floor.     I discussed the plan of care with patient, bedside RN, and ERP .    Review of Systems  Review of Systems   Constitutional:  Positive for malaise/fatigue.   Respiratory:  Positive for shortness of breath.    Cardiovascular:  Positive for leg swelling.   Neurological:  Positive for weakness.       Past Medical History   has a past medical history of Abnormal TSH (4/7/2017), Anemia (8/9/2022), Bowel habit changes, Breast cancer (HCC), Breath shortness, Cancer (HCC), Chickenpox, Colon polyps, Congestive heart failure (HCC), Dental disorder, Emphysema of lung (HCC), Essential " hypertension (03/31/2017), Heart burn, Indigestion, Mumps, Obesity, Sleep apnea, Thyroid disease, and Tobacco abuse.    Surgical History   has a past surgical history that includes colonoscopy and mastectomy (Right, 10/24/2017).     Family History  family history includes Cancer in her sister; Kidney cancer in her sister; Lung Cancer in her maternal uncle; Melanoma in her maternal uncle; No Known Problems in her daughter, father, maternal grandfather, maternal grandmother, mother, paternal grandfather, and paternal grandmother.   Family history reviewed with patient. There is no family history that is pertinent to the chief complaint.     Social History   reports that she quit smoking about 7 years ago. Her smoking use included cigarettes. She started smoking about 54 years ago. She has a 47.0 pack-year smoking history. She has never used smokeless tobacco. She reports that she does not drink alcohol and does not use drugs.    Allergies  Allergies   Allergen Reactions    Bee Venom Anaphylaxis       Medications  Prior to Admission Medications   Prescriptions Last Dose Informant Patient Reported? Taking?   METAMUCIL FIBER PO  Patient Yes No   Sig: Take 1 Dose by mouth every day.   Multiple Vitamins-Minerals (MULTIVITAMIN ADULT PO)  Patient Yes No   Sig: Take 1 Tablet by mouth every day.   albuterol 108 (90 Base) MCG/ACT Aero Soln inhalation aerosol   No No   Sig: Inhale 2 Puffs every 6 hours as needed for Shortness of Breath.   amLODIPine (NORVASC) 5 MG Tab   No No   Sig: Take 1 Tablet by mouth every day.   aspirin 81 MG tablet  Patient Yes No   Sig: Take 81 mg by mouth every evening.   atorvastatin (LIPITOR) 40 MG Tab   No No   Sig: Take 1 Tablet by mouth every day.   azithromycin (ZITHROMAX) 250 MG Tab   No No   Sig: Take two tablets by mouth day one and one tablet by mouth day 2-5   levothyroxine (SYNTHROID) 50 MCG Tab   No No   Sig: Take 1 Tablet by mouth every morning on an empty stomach.   sennosides-docusate  sodium (SENOKOT-S) 8.6-50 MG tablet   No No   Sig: Take 1 Tablet by mouth every day.      Facility-Administered Medications: None       Physical Exam  Temp:  [36.4 °C (97.5 °F)] 36.4 °C (97.5 °F)  Pulse:  [102-161] 102  Resp:  [16-20] 16  BP: (120-145)/() 120/62  SpO2:  [92 %-96 %] 94 %  Blood Pressure : 120/62   Temperature: 36.4 °C (97.5 °F)   Pulse: (!) 102   Respiration: 16   Pulse Oximetry: 94 %       Physical Exam  Vitals and nursing note reviewed.   Constitutional:       Appearance: She is obese. She is ill-appearing.      Comments: Poor hygiene    HENT:      Head: Normocephalic and atraumatic.      Right Ear: Tympanic membrane normal.      Left Ear: Tympanic membrane normal.      Nose: Nose normal.      Mouth/Throat:      Mouth: Mucous membranes are dry.      Pharynx: Oropharynx is clear.   Eyes:      Extraocular Movements: Extraocular movements intact.      Pupils: Pupils are equal, round, and reactive to light.   Cardiovascular:      Rate and Rhythm: Tachycardia present. Rhythm irregular.      Pulses: Normal pulses.      Heart sounds: Normal heart sounds.   Pulmonary:      Effort: Pulmonary effort is normal.      Breath sounds: Normal breath sounds.      Comments: Decreased breath sounds bilaterally   Poor air movement   Abdominal:      General: Bowel sounds are normal. There is no distension.      Palpations: Abdomen is soft. There is no mass.   Musculoskeletal:      Cervical back: Neck supple.      Comments: 2 + pitting edema    Skin:     General: Skin is warm.      Capillary Refill: Capillary refill takes less than 2 seconds.   Neurological:      General: No focal deficit present.      Mental Status: She is alert and oriented to person, place, and time. Mental status is at baseline.   Psychiatric:         Mood and Affect: Mood normal.         Behavior: Behavior normal.         Laboratory:  Recent Labs     12/09/23  2144   WBC 15.3*   RBC 4.15*   HEMOGLOBIN 12.3   HEMATOCRIT 39.5   MCV 95.2   MCH  29.6   MCHC 31.1*   RDW 53.0*   PLATELETCT 166   MPV 13.1*     Recent Labs     12/09/23 2144   SODIUM 143   POTASSIUM 4.0   CHLORIDE 97   CO2 34*   GLUCOSE 115*   BUN 44*   CREATININE 0.78   CALCIUM 11.1*     Recent Labs     12/09/23 2144   ALTSGPT 173*   ASTSGOT 203*   ALKPHOSPHAT 865*   TBILIRUBIN 1.7*   LIPASE 62   GLUCOSE 115*     Recent Labs     12/09/23 2144   APTT 27.0   INR 1.21*     Recent Labs     12/09/23 2144   NTPROBNP 568*         Recent Labs     12/09/23 2144   TROPONINT 34*       Imaging:  DX-CHEST-PORTABLE (1 VIEW)   Final Result      1.  Moderate right pleural effusion with adjacent airspace disease.   2.  Cardiomegaly.      US-RUQ    (Results Pending)   EC-ECHOCARDIOGRAM COMPLETE W/O CONT    (Results Pending)       X-Ray:  I have personally reviewed the images and compared with prior images.    Assessment/Plan:  Justification for Admission Status  I anticipate this patient will require at least two midnights for appropriate medical management, necessitating inpatient admission because atrial fibrillation with rvr, right pleural effusion     Patient will need a Telemetry bed on MEDICAL service .  The need is secondary to see above.    * Atrial fibrillation with RVR (HCC)- (present on admission)  Assessment & Plan  Telemetry monitoring  Continue with metoprolol 50 mg XL  Check TSH  Echocardiogram  Serial troponin  [id number] -start on heparin, gtt. given right pleural effusion need for IR in am and elevated LFTs  Right upper quadrant sonogram ordered r/o acute abdominal pathology requiring surgical intervention  Check magnesium goal >2    Transaminitis  Assessment & Plan  Hepatitis panel   RUQ sonogram ordered stat   Trend LFT's    Pleural effusion on right  Assessment & Plan  C/w Unasyn  Stated on heparin, gtt as opposed to NOAC given need for possible thoracentesis. F/u IR for thoracentesis     Chronic respiratory failure with hypoxia (HCC)- (present on admission)  Assessment &  Plan  Oxygen per protocol     LUIS (obstructive sleep apnea)- (present on admission)  Assessment & Plan  Likely chronic obesity hypoventilation syndrome/LUIS  Nocturnal CPAP ordered    Acquired hypothyroidism- (present on admission)  Assessment & Plan  Resume synthroid   TSH norm    COPD (chronic obstructive pulmonary disease) (HCC)- (present on admission)  Assessment & Plan  Possible exacerbation   Solumederol 125 mg x 1, continue with systemic steroids   Xopenex/ipratropium nebs   Oxygen per protocol   Check viral COVID 19/Flu PCR    Morbid obesity with BMI of 45.0-49.9, adult ([facility])- (present on admission)  Assessment & Plan  BMI 49.6    Essential hypertension- (present on admission)  Assessment & Plan  Lisinopril 2.5 mg daily         VTE prophylaxis: SCDs/TEDs

## 2023-12-10 NOTE — PROGRESS NOTES
4 Eyes Skin Assessment Completed by JASMINA Gregorio and JASMINA Mancia.    Head cradle cap/flaky skin  Ears WDL  Nose WDL  Mouth WDL  Neck Redness and Blanching  Breast/Chest Redness, Abrasion, Scab, Excoriation, and Weeping  Shoulder Blades Redness and Blanching  Spine WDL  (R) Arm/Elbow/Hand Bruising  (L) Arm/Elbow/Hand Redness, Blanching, Bruising, and Abrasion  Abdomen Redness, Blanching, and Bruising  Groin Redness, Excoriation, and Rash  Scrotum/Coccyx/Buttocks Redness and Blanching  (R) Leg dry/flaky,  bruising  (L) Leg dry/flaky,  bruising  (R) Heel/Foot/Toe Redness and Blanching  (L) Heel/Foot/Toe Redness and Blanching          Devices In Places ECG, Blood Pressure Cuff, Pulse Ox, [place], Arterial Line, ET Tube, OG/NG, and Central Line      Interventions In Place Heel Mepilex, Pillows, Q2 Turns, Low Air Loss Mattress, Barrier Cream, and Heels Loaded W/Pillows    Possible Skin Injury Yes    Pictures Uploaded Into Epic Yes  Wound Consult Placed Yes  RN Wound Prevention Protocol Ordered Yes

## 2023-12-10 NOTE — PROCEDURES
Procedure Note    Date: 12/10/2023  Time: 0245    Procedure: Emergent electrical cardioversion, procedural sedation    Indication: Afib with RVR with worsening shock/respiratory failure  Consent: Informed consent obtained from patient or designated decision maker after explaining the benefits/risks of the procedure including but not limited to pain, loss of airway protection, hypoxemia, burn, arrythmia, or death. Patient or surrogate expressed understanding and agreement.    Procedure: After obtaining consent, a time-out was performed. Pads were placed on patient's chest in the anterior-posterior location. All back-up airway, suction, and code cart supplies ready at bedside. Patient provided conscious sedation with medications reaching a moderate sedation level. At that point, in synchronized fashion, 120J of electricity delivered with successful cardioversion into a NSR with HR 80s. BP started to improve. Patient loaded with IV amiodarone and started on a gtt. Patient left in care of RN and RT without complications.    Medications: amiodarone 150 mg + gtt  Complications: none    Jeremy Gonda, MD  Critical Care Medicine

## 2023-12-10 NOTE — ED PROVIDER NOTES
"ED Provider Note    CHIEF COMPLAINT  Chief Complaint   Patient presents with    Weakness     Failure to thrive, unable to take care of self at home. Home health seeing patient at home. -falls. States weakness is in her legs bilaterally, pt states she is unable to walk now.     Shortness of Breath     Pt states \"its been a long time\" arrives on 4L NC to which is pts home O2. Hx COPD.    Arrhythmia     Pt presents in Afib RVR up to 160s. EKG complete, pt states she takes no medication for Afib.       EXTERNAL RECORDS REVIEWED  Inpatient Notes admission 10/20/2023 for palpitations and patient found to be hyponatremic.  Hyponatremia was resolved patient was downgraded from ICU to the floor and ultimately discharged with home health.    HPI/ROS  LIMITATION TO HISTORY   Select: : None  OUTSIDE HISTORIAN(S):  None    Mallorie Xie is a 68 y.o. female who presents for failure to thrive and arrhythmia.  When asked patient what she comes to the ER for she states \"A-fib\".  She denies history of A-fib but is aware of what it is and believes she has it.  She states that she had voiced that she may be having racing heart at her admission in October but it was never identified on telemetry.  She also notes shortness of breath but notes she is on her 4 L nasal cannula which she has been on for quite some time.  She has no acute chest pain or worsened shortness of breath.  She notes that her home health sessions are ending and she is unable to care for herself which is another reason why she is here.  She notes she has weakness in her legs and has been unable to walk.  She denies fevers, wheezing, cough, dysuria or hematuria, diarrhea, abdominal pain    PAST MEDICAL HISTORY   has a past medical history of Abnormal TSH (4/7/2017), Anemia (8/9/2022), Bowel habit changes, Breast cancer (HCC), Breath shortness, Cancer (HCC), Chickenpox, Colon polyps, Congestive heart failure (HCC), Dental disorder, Emphysema of lung (HCC), Essential " hypertension (2017), Heart burn, Indigestion, Mumps, Obesity, Sleep apnea, Thyroid disease, and Tobacco abuse.    SURGICAL HISTORY   has a past surgical history that includes colonoscopy and mastectomy (Right, 10/24/2017).    FAMILY HISTORY  Family History   Problem Relation Age of Onset    No Known Problems Mother     No Known Problems Father     Cancer Sister         cervical    Kidney cancer Sister     Lung Cancer Maternal Uncle     Melanoma Maternal Uncle     No Known Problems Maternal Grandmother     No Known Problems Maternal Grandfather     No Known Problems Paternal Grandmother     No Known Problems Paternal Grandfather     No Known Problems Daughter     Sleep Apnea Neg Hx        SOCIAL HISTORY  Social History     Tobacco Use    Smoking status: Former     Current packs/day: 0.00     Average packs/day: 1 pack/day for 47.0 years (47.0 ttl pk-yrs)     Types: Cigarettes     Start date: 1969     Quit date: 2016     Years since quittin.0    Smokeless tobacco: Never   Vaping Use    Vaping Use: Never used   Substance and Sexual Activity    Alcohol use: No     Alcohol/week: 0.0 oz    Drug use: No    Sexual activity: Not Currently       CURRENT MEDICATIONS  Home Medications       Reviewed by Heidi May R.N. (Registered Nurse) on 23 at 2139  Med List Status: Partial     Medication Last Dose Status   albuterol 108 (90 Base) MCG/ACT Aero Soln inhalation aerosol  Active   amLODIPine (NORVASC) 5 MG Tab  Active   aspirin 81 MG tablet  Active   atorvastatin (LIPITOR) 40 MG Tab  Active   azithromycin (ZITHROMAX) 250 MG Tab  Active   levothyroxine (SYNTHROID) 50 MCG Tab  Active   METAMUCIL FIBER PO  Active   Multiple Vitamins-Minerals (MULTIVITAMIN ADULT PO)  Active   sennosides-docusate sodium (SENOKOT-S) 8.6-50 MG tablet  Active                    ALLERGIES  Allergies   Allergen Reactions    Bee Venom Anaphylaxis       PHYSICAL EXAM  VITAL SIGNS: BP 94/57   Pulse (!) 130   Temp 37.2 °C (99 °F)  "  Resp 20   Ht 1.6 m (5' 3\")   Wt 124 kg (272 lb 4.3 oz)   SpO2 88%   BMI 48.23 kg/m²    Constitutional: Awake and alert. No acute distress.  Head: NCAT.  HEENT: Normal Conjunctiva. PERRLA.  Dry mucous membranes and tongue.  Neck: Grossly normal range of motion. Airway midline.  Cardiovascular: Normal heart rate, Normal rhythm.  No lower extremity edema.  Thorax & Lungs: No respiratory distress.  Diminished bases bilaterally.  Patient is on 4 L nasal cannula and reports this is her home oxygen level.  Abdomen: Normal inspection. Nontender.  Protuberant abdomen but not tympanic.  Skin: No obvious rash.  Back: No tenderness, No CVA tenderness.   Musculoskeletal: No obvious deformity. Moves all extremities Well.  Neurologic: A&Ox3.   Psychiatric: Mood and affect are appropriate for situation.      DIAGNOSTIC STUDIES / PROCEDURES  EKG  I have independently interpreted this EKG  A-fib RVR with a rate of 1 50-1 70.  No obvious ST elevations or depressions.    LABS  Results for orders placed or performed during the hospital encounter of 12/09/23   CBC with Differential   Result Value Ref Range    WBC 15.3 (H) 4.8 - 10.8 K/uL    RBC 4.15 (L) 4.20 - 5.40 M/uL    Hemoglobin 12.3 12.0 - 16.0 g/dL    Hematocrit 39.5 37.0 - 47.0 %    MCV 95.2 81.4 - 97.8 fL    MCH 29.6 27.0 - 33.0 pg    MCHC 31.1 (L) 32.2 - 35.5 g/dL    RDW 53.0 (H) 35.9 - 50.0 fL    Platelet Count 166 164 - 446 K/uL    MPV 13.1 (H) 9.0 - 12.9 fL    Neutrophils-Polys 87.80 (H) 44.00 - 72.00 %    Lymphocytes 4.30 (L) 22.00 - 41.00 %    Monocytes 7.00 0.00 - 13.40 %    Eosinophils 0.00 0.00 - 6.90 %    Basophils 0.00 0.00 - 1.80 %    Nucleated RBC 0.70 (H) 0.00 - 0.20 /100 WBC    Neutrophils (Absolute) 13.43 (H) 1.82 - 7.42 K/uL    Lymphs (Absolute) 0.66 (L) 1.00 - 4.80 K/uL    Monos (Absolute) 1.07 (H) 0.00 - 0.85 K/uL    Eos (Absolute) 0.00 0.00 - 0.51 K/uL    Baso (Absolute) 0.00 0.00 - 0.12 K/uL    NRBC (Absolute) 0.10 K/uL    Anisocytosis 1+     " Microcytosis 1+    Comp Metabolic Panel   Result Value Ref Range    Sodium 143 135 - 145 mmol/L    Potassium 4.0 3.6 - 5.5 mmol/L    Chloride 97 96 - 112 mmol/L    Co2 34 (H) 20 - 33 mmol/L    Anion Gap 12.0 7.0 - 16.0    Glucose 115 (H) 65 - 99 mg/dL    Bun 44 (H) 8 - 22 mg/dL    Creatinine 0.78 0.50 - 1.40 mg/dL    Calcium 11.1 (H) 8.5 - 10.5 mg/dL    Correct Calcium 11.7 (H) 8.5 - 10.5 mg/dL    AST(SGOT) 203 (H) 12 - 45 U/L    ALT(SGPT) 173 (H) 2 - 50 U/L    Alkaline Phosphatase 865 (H) 30 - 99 U/L    Total Bilirubin 1.7 (H) 0.1 - 1.5 mg/dL    Albumin 3.2 3.2 - 4.9 g/dL    Total Protein 6.4 6.0 - 8.2 g/dL    Globulin 3.2 1.9 - 3.5 g/dL    A-G Ratio 1.0 g/dL   LACTIC ACID   Result Value Ref Range    Lactic Acid 1.3 0.5 - 2.0 mmol/L   TSH WITH REFLEX TO FT4   Result Value Ref Range    TSH 4.590 0.380 - 5.330 uIU/mL   Prothrombin Time   Result Value Ref Range    PT 15.5 (H) 12.0 - 14.6 sec    INR 1.21 (H) 0.87 - 1.13   APTT   Result Value Ref Range    APTT 27.0 24.7 - 36.0 sec   LIPASE   Result Value Ref Range    Lipase 62 11 - 82 U/L   TROPONIN   Result Value Ref Range    Troponin T 34 (H) 6 - 19 ng/L   proBrain Natriuretic Peptide, NT   Result Value Ref Range    NT-proBNP 568 (H) 0 - 125 pg/mL   ESTIMATED GFR   Result Value Ref Range    GFR (CKD-EPI) 81 >60 mL/min/1.73 m 2   MORPHOLOGY   Result Value Ref Range    RBC Morphology Present     Poikilocytosis 1+     Ovalocytes 1+     Stomatocytes 1+     Hypersegmented Poly Few    PERIPHERAL SMEAR REVIEW   Result Value Ref Range    Peripheral Smear Review see below    DIFFERENTIAL MANUAL   Result Value Ref Range    Metamyelocytes 0.90 %    Manual Diff Status PERFORMED    PLATELET ESTIMATE   Result Value Ref Range    Plt Estimation Normal    CBC Without Differential   Result Value Ref Range    WBC 16.8 (H) 4.8 - 10.8 K/uL    RBC 4.08 (L) 4.20 - 5.40 M/uL    Hemoglobin 12.0 12.0 - 16.0 g/dL    Hematocrit 39.5 37.0 - 47.0 %    MCV 96.8 81.4 - 97.8 fL    MCH 29.4 27.0 -  33.0 pg    MCHC 30.4 (L) 32.2 - 35.5 g/dL    RDW 54.3 (H) 35.9 - 50.0 fL    Platelet Count 177 164 - 446 K/uL    MPV 13.7 (H) 9.0 - 12.9 fL   Comp Metabolic Panel   Result Value Ref Range    Sodium 145 135 - 145 mmol/L    Potassium 4.2 3.6 - 5.5 mmol/L    Chloride 98 96 - 112 mmol/L    Co2 38 (H) 20 - 33 mmol/L    Anion Gap 9.0 7.0 - 16.0    Glucose 124 (H) 65 - 99 mg/dL    Bun 45 (H) 8 - 22 mg/dL    Creatinine 0.85 0.50 - 1.40 mg/dL    Calcium 10.4 8.5 - 10.5 mg/dL    Correct Calcium 11.0 (H) 8.5 - 10.5 mg/dL    AST(SGOT) 213 (H) 12 - 45 U/L    ALT(SGPT) 169 (H) 2 - 50 U/L    Alkaline Phosphatase 882 (H) 30 - 99 U/L    Total Bilirubin 1.9 (H) 0.1 - 1.5 mg/dL    Albumin 3.3 3.2 - 4.9 g/dL    Total Protein 5.9 (L) 6.0 - 8.2 g/dL    Globulin 2.6 1.9 - 3.5 g/dL    A-G Ratio 1.3 g/dL   aPTT   Result Value Ref Range    APTT 27.3 24.7 - 36.0 sec   Prothrombin Time   Result Value Ref Range    PT 15.4 (H) 12.0 - 14.6 sec    INR 1.21 (H) 0.87 - 1.13   Arterial Blood Gas   Result Value Ref Range    Ph 7.20 (LL) 7.40 - 7.50    Pco2 101.4 (HH) 26.0 - 37.0 mmHg    Po2 72.6 64.0 - 87.0 mmHg    O2 Saturation 87.0 (L) 93.0 - 99.0 %    Hco3 38 (H) 17 - 25 mmol/L    Base Excess 7 (H) -4 - 3 mmol/L    Body Temp 37.2 Centigrade    O2 Therapy 3 liters    Heparin Anti-Xa   Result Value Ref Range    Heparin Xa (UFH) <0.10 IU/mL   D-DIMER   Result Value Ref Range    D-Dimer 2.01 (H) 0.00 - 0.50 ug/mL (FEU)   ESTIMATED GFR   Result Value Ref Range    GFR (CKD-EPI) 73 >60 mL/min/1.73 m 2   EKG (NOW)   Result Value Ref Range    Report       [facility]    Test Date:  2023-12-10  Pt Name:    JEREMY MATHEW                   Department: 183  MRN:        [id number]                      Room:       [facility]  Gender:     Female                       Technician: CLARICE  :        [id number]                   Requested By:FRED DEVLIN  Order #:    [id number]                    Reading MD:    Measurements  Intervals                                 Axis  Rate:       141                          P:          0  KY:         0                            QRS:        -83  QRSD:       91                           T:          51  QT:         304  QTc:        466    Interpretive Statements  Atrial fibrillation  Left anterior fascicular block  Borderline low voltage, extremity leads  Probable anteroseptal infarct, old  Baseline wander in lead(s) V1  Compared to ECG 12/09/2023 21:36:43  T-wave abnormality no longer present  Myocardial infarct finding still present     POCT glucose device results   Result Value Ref Range    POC Glucose, Blood 124 (H) 65 - 99 mg/dL         RADIOLOGY  I have independently interpreted the diagnostic imaging associated with this visit and am waiting the final reading from the radiologist.   My preliminary interpretation is as follows: Moderate right pleural effusion.  Radiologist interpretation:   CT-HEAD W/O   Final Result      No acute intracranial abnormality.         US-RUQ   Final Result      1.  Multiple hepatic masses with heterogeneous echotexture and hepatomegaly. Recommend contrast-enhanced CT or MRI evaluation.   2.  Right perinephric fluid. Attention on cross-sectional imaging.   3.  The gallbladder is not identified and may have been surgically removed.            DX-CHEST-PORTABLE (1 VIEW)   Final Result      1.  Moderate right pleural effusion with adjacent airspace disease.   2.  Cardiomegaly.      EC-ECHOCARDIOGRAM COMPLETE W/O CONT    (Results Pending)   IR-CONSULT AND TREAT    (Results Pending)   DX-CHEST-PORTABLE (1 VIEW)    (Results Pending)         COURSE & MEDICAL DECISION MAKING    ED Observation Status? No; Patient does not meet criteria for ED Observation.     INITIAL ASSESSMENT, COURSE AND PLAN  Care Narrative:   68-year-old female With history of anemia, CHF, emphysema on 4 L nasal cannula at baseline, hypothyroidism presents for failure to thrive and racing heart found to be in A-fib.  Afebrile, heart rate 150s to  170s on arrival, adequate blood pressure.  Patient is saturating in the 90s on 4 L of nasal cannula which she reports is her home dose.  On exam she has diminished bases bilaterally, does not exhibit respiratory distress.  Clinical exam she appears dry and does not have significant lower extremity edema.  She does have a large abdomen but is soft and nontender likely obesity.  EKG confirms A-fib RVR with no acute ST or T wave changes.  Chest x-ray shows moderate pleural effusion and is read as airspace disease as well.  Clinically patient appears dry and fluids have been started initially.  Patient was ordered 2 L of fluid but these were quickly discontinued and she only received approximately 400 cc once the pleural effusion was identified.  Heart rate controlled with metoprolol 5 mg IV x 3.  She was then orally loaded after satisfactory control of her heart rate to approximately 110.  Labs reveal leukocytosis to 15, Bicarb minimal elevation likely chronic in setting of COPD, hyperglycemia, normal kidney function.  Her LFTs are elevated of unclear significance, proBNP is moderately elevated at 568 and troponin is 34 which we will trend.  Patient was started on antibiotics for community-acquired pneumonia including Unasyn and azithromycin.  Lactate returns at 1.3 and further confirming the patient does not need fluid resuscitation.  Patient remaining with stable and/or improved vital signs since arrival and remains GCS 15.  She is not having increasing oxygen requirement from her baseline.    She is requiring admission for new A-fib RVR, pneumonia and moderate pleural effusion.  Hospitalist consulted for admission and accepted patient.    HTN/IDDM FOLLOW UP:  The patient has known hypertension and is being followed by their primary care doctor    CRITICAL CARE  The very real possibilty of a deterioration of this patient's condition required the highest level of my preparedness for sudden, emergent intervention.  I  provided critical care services, which included medication orders, frequent reevaluations of the patient's condition and response to treatment, ordering and reviewing test results, and discussing the case with various consultants.  The critical care time associated with the care of the patient was 35 minutes. Review chart for interventions. This time is exclusive of any other billable procedures.         ADDITIONAL PROBLEM LIST    Atrial fibrillation with RVR  Pleural effusion  Pneumonia  Co2 retention  Hyperglyvcemia  Elevated troponin  Heart failure    DISPOSITION AND DISCUSSIONS  I have discussed management of the patient with the following physicians and NORMA's:  Dr. Feliciano - hospitalist    Discussion of management with other Hospitals in [place] or appropriate source(s): None     Barriers to care at this time, including but not limited to: Patient lacks transportation .     Decision tools and prescription drugs considered including, but not limited to: Antibiotics antibiotics for community-acquired pneumonia .    FINAL DIAGNOSIS  1. Atrial fibrillation, unspecified type (HCC)    2. Pneumonia of right lung due to infectious organism, unspecified part of lung    3. Hypoxia    4. Pleural effusion on right    5. Chronic congestive heart failure, unspecified heart failure type (HCC)           Electronically signed by: Giselle Gardner D.O., 12/9/2023 9:57 PM

## 2023-12-10 NOTE — CONSULTS
Critical Care Consultation    Date of consult: 12/10/2023    Referring Physician  NOLA Benton    Reason for Consultation  Cardiac arrest    History of Presenting Illness  68 y.o. female who presented 12/9/2023 with a past medical history significant for morbid obesity, congestive heart failure, obstructive sleep apnea, hypertension who was brought in by EMS from home for inability to care for herself at home with diffuse weakness, leg swelling, shortness of breath despite her 4 L/min nasal cannula which she uses at home.  She was found to be in atrial fibrillation with rapid ventricular response with a heart rate in the 160s which apparently was new for her.  She was admitted to the hospital in October of this year for pneumonia and hyponatremia and discharged home with home health.  Patient was given 400 cc of fluid and 5 of IV metoprolol x 3 doses with oral loading eventually bring her heart rate down to 110.  She was given Unasyn and azithromycin for presumed community-acquired pneumonia and admitted to the hospitalist services.  She was to be started on a heparin drip and was started on Solu-Medrol and bronchodilators for presumed COPD exacerbation.  Early this morning, patient was initially alert and oriented x 4 with increasing oxygen requirements but at 0 150 a rapid response was called as she became altered and had left-sided weakness.  A code stroke was called and she was taken down to the CT scan for imaging.  Unfortunately when they laid her flat she went agonal and went into a PEA arrest.  A supraglottic airway was placed and she was given 2 mg of epinephrine and underwent 3 rounds of CPR before return of spontaneous circulation.  Postarrest, she remains in atrial fibrillation with rapid ventricular response and hypotension and was started on a norepinephrine drip.  A head CT without contrast was performed at the request of neurology which did not show any acute process and a CTA of her head and  neck will be canceled for now.  She was loaded with IV amiodarone and started on a drip and transferred to the intensive care unit after electrical cardioversion for worsening shock with A-fib with RVR.    Code Status  Full Code    Review of Systems  Review of Systems   Unable to perform ROS: Intubated       Past Medical History   has a past medical history of Abnormal TSH (4/7/2017), Anemia (8/9/2022), Bowel habit changes, Breast cancer (HCC), Breath shortness, Cancer (HCC), Chickenpox, Colon polyps, Congestive heart failure (HCC), Dental disorder, Emphysema of lung (HCC), Essential hypertension (03/31/2017), Heart burn, Indigestion, Mumps, Obesity, Sleep apnea, Thyroid disease, and Tobacco abuse.    She has no past medical history of Anxiety, Arrhythmia, Asthma, Blood transfusion without reported diagnosis, Clotting disorder (HCC), Depression, Diabetes (HCC), GERD (gastroesophageal reflux disease), Heart attack (HCC), Heart murmur, HIV (human immunodeficiency virus infection) (HCC), Hyperlipidemia, Kidney disease, Migraine, Seizure (HCC), or Stroke (HCC).    Surgical History   has a past surgical history that includes colonoscopy and mastectomy (Right, 10/24/2017).    Family History  family history includes Cancer in her sister; Kidney cancer in her sister; Lung Cancer in her maternal uncle; Melanoma in her maternal uncle; No Known Problems in her daughter, father, maternal grandfather, maternal grandmother, mother, paternal grandfather, and paternal grandmother.    Social History   reports that she quit smoking about 7 years ago. Her smoking use included cigarettes. She started smoking about 54 years ago. She has a 47.0 pack-year smoking history. She has never used smokeless tobacco. She reports that she does not drink alcohol and does not use drugs.    Medications  Home Medications       Reviewed by Heidi May R.N. (Registered Nurse) on 12/09/23 at 7144  Med List Status: Partial     Medication Last Dose  Status   albuterol 108 (90 Base) MCG/ACT Aero Soln inhalation aerosol  Active   amLODIPine (NORVASC) 5 MG Tab  Active   aspirin 81 MG tablet  Active   atorvastatin (LIPITOR) 40 MG Tab  Active   azithromycin (ZITHROMAX) 250 MG Tab  Active   levothyroxine (SYNTHROID) 50 MCG Tab  Active   METAMUCIL FIBER PO  Active   Multiple Vitamins-Minerals (MULTIVITAMIN ADULT PO)  Active   sennosides-docusate sodium (SENOKOT-S) 8.6-50 MG tablet  Active                  Current Facility-Administered Medications   Medication Dose Route Frequency Provider Last Rate Last Admin    ampicillin/sulbactam (Unasyn) 3 g in  mL IVPB  3 g Intravenous Q6HRS Aj Feliciano M.D.        heparin infusion 25,000 units in 500 mL 0.45% NACL  0-30 Units/kg/hr (Adjusted) Intravenous Continuous Aj Feliciano M.D.        heparin injection 6,600 Units  80 Units/kg (Adjusted) Intravenous Once Aj Feliciano M.D.        heparin injection 3,300 Units  40 Units/kg (Adjusted) Intravenous PRN Aj Feliciano M.D.        aspirin EC tablet 81 mg  81 mg Oral DAILY Aj Feliciano M.D.        levothyroxine (Synthroid) tablet 50 mcg  50 mcg Oral AM ES Aj Feliciano M.D.        nystatin (Mycostatin) powder   Topical BID MARY RebollarNLauraP.        NALOXONE HCL 0.4 MG/ML INJ SOLN             Respiratory Therapy Consult   Nebulization Continuous RT Jeremy M Gonda, M.D.        famotidine (Pepcid) tablet 20 mg  20 mg Enteral Tube Q12HRS Jeremy M Gonda, M.D.        Or    famotidine (Pepcid) injection 20 mg  20 mg Intravenous Q12HRS Jeremy M Gonda, M.D. MD Alert...ICU Electrolyte Replacement per Pharmacy   Other PHARMACY TO DOSE Jeremy M Gonda, M.D.        lidocaine (Xylocaine) 1 % injection 2 mL  2 mL Tracheal Tube Q30 MIN PRN Jeremy M Gonda, M.D.        dexmedetomidine (PRECEDEX) 400 mcg/100mL NS premix infusion  0-1.5 mcg/kg/hr (Ideal) Intravenous Continuous Jeremy M Gonda, M.D. 6.6 mL/hr at 12/10/23 0351 0.5 mcg/kg/hr at 12/10/23 0351    fentaNYL  (Sublimaze) injection 25 mcg  25 mcg Intravenous Q HOUR PRN Jeremy M Gonda, M.D.        Or    fentaNYL (Sublimaze) injection 50 mcg  50 mcg Intravenous Q HOUR PRN Jeremy M Gonda, M.D.        Or    fentaNYL (Sublimaze) injection 100 mcg  100 mcg Intravenous Q HOUR PRN Jeremy M Gonda, M.D.        norepinephrine (Levophed) 8 mg in 250 mL NS infusion (premix)  0-1 mcg/kg/min (Ideal) Intravenous Continuous Jeremy M Gonda, M.D. 49.1 mL/hr at 12/10/23 0355 0.5 mcg/kg/min at 12/10/23 0355    AMIODARONE HCL IN DEXTROSE 150-4.21 MG/100ML-% IV SOLN             furosemide (Lasix) injection 40 mg  40 mg Intravenous BID DIURETIC Jeremy M Gonda, M.D.   40 mg at 12/10/23 0416    methylPREDNISolone (SOLU-MEDROL) 40 MG injection 40 mg  40 mg Intravenous Q6HRS Jeremy M Gonda, M.D.        amiodarone (Nexterone) 360 mg/200 mL infusion  1 mg/min Intravenous Once Jeremy M Gonda, M.D. 33.3 mL/hr at 12/10/23 0352 1 mg/min at 12/10/23 0352    Followed by    amiodarone (Nexterone) 360 mg/200 mL infusion  0.5 mg/min Intravenous Continuous Jeremy M Gonda, M.D.        Metoprolol Tartrate (Lopressor) injection 5 mg  5 mg Intravenous Q5 MIN PRN Giselle Gardner D.O.   5 mg at 12/09/23 2243    senna-docusate (Pericolace Or Senokot S) 8.6-50 MG per tablet 2 Tablet  2 Tablet Oral BID Aj Feliciano M.D.        And    polyethylene glycol/lytes (Miralax) Packet 1 Packet  1 Packet Oral QDAY PRN Aj Feliciano M.D.        And    magnesium hydroxide (Milk Of Magnesia) suspension 30 mL  30 mL Oral QDAY PRN Aj Feliciano M.D.        And    bisacodyl (Dulcolax) suppository 10 mg  10 mg Rectal QDAY PRN Aj Feliciano M.D.        levalbuterol (Xopenex) 0.63 MG/3ML nebulizer solution 0.63 mg  0.63 mg Nebulization Q4H PRN (RT) Aj Feliciano M.D.           Allergies  Allergies   Allergen Reactions    Bee Venom Anaphylaxis       Vital Signs last 24 hours  Temp:  [36.4 °C (97.5 °F)-37.2 °C (99 °F)] 37.2 °C (99 °F)  Pulse:  [] 82  Resp:  [16-45]  26  BP: ()/() 113/66  SpO2:  [79 %-96 %] 90 %    Physical Exam  Physical Exam  Vitals and nursing note reviewed.   Constitutional:       General: She is in acute distress.      Appearance: Normal appearance. She is well-developed. She is morbidly obese. She is ill-appearing.      Interventions: She is sedated, intubated and restrained.   HENT:      Head: Normocephalic and atraumatic.      Nose: Nose normal. No congestion.      Mouth/Throat:      Mouth: Mucous membranes are dry.      Pharynx: Oropharynx is clear.      Comments: Endotracheal tube and gastric tube in place  Eyes:      General: No scleral icterus.     Conjunctiva/sclera: Conjunctivae normal.      Pupils: Pupils are equal, round, and reactive to light.      Comments: Pupils are 2 mm and reactive bilaterally   Neck:      Vascular: No JVD.      Trachea: No tracheal deviation.   Cardiovascular:      Rate and Rhythm: Tachycardia present. Rhythm irregularly irregular. Frequent Extrasystoles are present.     Chest Wall: PMI is displaced.      Pulses: Decreased pulses.      Heart sounds: Heart sounds are distant. No murmur heard.     Comments: In A-fib with RVR on telemetry monitoring, hypotensive requiring vasopressor support  Pulmonary:      Effort: Tachypnea and respiratory distress present. She is intubated.      Breath sounds: Examination of the right-middle field reveals decreased breath sounds and rales. Examination of the left-middle field reveals rales. Examination of the right-lower field reveals decreased breath sounds, rhonchi and rales. Examination of the left-lower field reveals rhonchi and rales. Decreased breath sounds, rhonchi and rales present. No wheezing.      Comments: High peak pressures in the mid 30s, moderate secretions  Abdominal:      General: Bowel sounds are normal. There is distension.      Palpations: Abdomen is soft.      Tenderness: There is no abdominal tenderness. There is no guarding or rebound.      Comments:  Tympanitic   Genitourinary:     Comments: External female catheter in place  Musculoskeletal:         General: No tenderness.      Cervical back: Neck supple. No tenderness.      Right lower leg: Edema present.      Left lower leg: Edema present.   Skin:     General: Skin is warm and dry.      Capillary Refill: Capillary refill takes 2 to 3 seconds.      Coloration: Skin is pale.      Findings: No rash.   Neurological:      Mental Status: She is lethargic.      Comments: Recently paralyzed and sedated for intubation         Fluids    Intake/Output Summary (Last 24 hours) at 12/10/2023 0423  Last data filed at 12/10/2023 0351  Gross per 24 hour   Intake 0.13 ml   Output --   Net 0.13 ml       Laboratory  Recent Results (from the past 48 hour(s))   CBC with Differential    Collection Time: 12/09/23  9:44 PM   Result Value Ref Range    WBC 15.3 (H) 4.8 - 10.8 K/uL    RBC 4.15 (L) 4.20 - 5.40 M/uL    Hemoglobin 12.3 12.0 - 16.0 g/dL    Hematocrit 39.5 37.0 - 47.0 %    MCV 95.2 81.4 - 97.8 fL    MCH 29.6 27.0 - 33.0 pg    MCHC 31.1 (L) 32.2 - 35.5 g/dL    RDW 53.0 (H) 35.9 - 50.0 fL    Platelet Count 166 164 - 446 K/uL    MPV 13.1 (H) 9.0 - 12.9 fL    Neutrophils-Polys 87.80 (H) 44.00 - 72.00 %    Lymphocytes 4.30 (L) 22.00 - 41.00 %    Monocytes 7.00 0.00 - 13.40 %    Eosinophils 0.00 0.00 - 6.90 %    Basophils 0.00 0.00 - 1.80 %    Nucleated RBC 0.70 (H) 0.00 - 0.20 /100 WBC    Neutrophils (Absolute) 13.43 (H) 1.82 - 7.42 K/uL    Lymphs (Absolute) 0.66 (L) 1.00 - 4.80 K/uL    Monos (Absolute) 1.07 (H) 0.00 - 0.85 K/uL    Eos (Absolute) 0.00 0.00 - 0.51 K/uL    Baso (Absolute) 0.00 0.00 - 0.12 K/uL    NRBC (Absolute) 0.10 K/uL    Anisocytosis 1+     Microcytosis 1+    Comp Metabolic Panel    Collection Time: 12/09/23  9:44 PM   Result Value Ref Range    Sodium 143 135 - 145 mmol/L    Potassium 4.0 3.6 - 5.5 mmol/L    Chloride 97 96 - 112 mmol/L    Co2 34 (H) 20 - 33 mmol/L    Anion Gap 12.0 7.0 - 16.0    Glucose 115 (H)  65 - 99 mg/dL    Bun 44 (H) 8 - 22 mg/dL    Creatinine 0.78 0.50 - 1.40 mg/dL    Calcium 11.1 (H) 8.5 - 10.5 mg/dL    Correct Calcium 11.7 (H) 8.5 - 10.5 mg/dL    AST(SGOT) 203 (H) 12 - 45 U/L    ALT(SGPT) 173 (H) 2 - 50 U/L    Alkaline Phosphatase 865 (H) 30 - 99 U/L    Total Bilirubin 1.7 (H) 0.1 - 1.5 mg/dL    Albumin 3.2 3.2 - 4.9 g/dL    Total Protein 6.4 6.0 - 8.2 g/dL    Globulin 3.2 1.9 - 3.5 g/dL    A-G Ratio 1.0 g/dL   TSH WITH REFLEX TO FT4    Collection Time: 12/09/23  9:44 PM   Result Value Ref Range    TSH 4.590 0.380 - 5.330 uIU/mL   Prothrombin Time    Collection Time: 12/09/23  9:44 PM   Result Value Ref Range    PT 15.5 (H) 12.0 - 14.6 sec    INR 1.21 (H) 0.87 - 1.13   APTT    Collection Time: 12/09/23  9:44 PM   Result Value Ref Range    APTT 27.0 24.7 - 36.0 sec   LIPASE    Collection Time: 12/09/23  9:44 PM   Result Value Ref Range    Lipase 62 11 - 82 U/L   TROPONIN    Collection Time: 12/09/23  9:44 PM   Result Value Ref Range    Troponin T 34 (H) 6 - 19 ng/L   proBrain Natriuretic Peptide, NT    Collection Time: 12/09/23  9:44 PM   Result Value Ref Range    NT-proBNP 568 (H) 0 - 125 pg/mL   ESTIMATED GFR    Collection Time: 12/09/23  9:44 PM   Result Value Ref Range    GFR (CKD-EPI) 81 >60 mL/min/1.73 m 2   MORPHOLOGY    Collection Time: 12/09/23  9:44 PM   Result Value Ref Range    RBC Morphology Present     Poikilocytosis 1+     Ovalocytes 1+     Stomatocytes 1+     Hypersegmented Poly Few    PERIPHERAL SMEAR REVIEW    Collection Time: 12/09/23  9:44 PM   Result Value Ref Range    Peripheral Smear Review see below    DIFFERENTIAL MANUAL    Collection Time: 12/09/23  9:44 PM   Result Value Ref Range    Metamyelocytes 0.90 %    Manual Diff Status PERFORMED    PLATELET ESTIMATE    Collection Time: 12/09/23  9:44 PM   Result Value Ref Range    Plt Estimation Normal    HEPATITIS PANEL ACUTE(4 COMPONENTS)    Collection Time: 12/09/23  9:44 PM   Result Value Ref Range    Hepatitis B Surface  Antigen Non-Reactive Non-Reactive    Hepatitis B Cors Ab,IgM Non-Reactive Non-Reactive    Hepatitis A Virus Ab, IgM Non-Reactive Non-Reactive    Hepatitis C Antibody Non-Reactive Non-Reactive   LACTIC ACID    Collection Time: 12/09/23 10:15 PM   Result Value Ref Range    Lactic Acid 1.3 0.5 - 2.0 mmol/L   POCT glucose device results    Collection Time: 12/10/23  1:52 AM   Result Value Ref Range    POC Glucose, Blood 124 (H) 65 - 99 mg/dL   CBC Without Differential    Collection Time: 12/10/23  2:05 AM   Result Value Ref Range    WBC 16.8 (H) 4.8 - 10.8 K/uL    RBC 4.08 (L) 4.20 - 5.40 M/uL    Hemoglobin 12.0 12.0 - 16.0 g/dL    Hematocrit 39.5 37.0 - 47.0 %    MCV 96.8 81.4 - 97.8 fL    MCH 29.4 27.0 - 33.0 pg    MCHC 30.4 (L) 32.2 - 35.5 g/dL    RDW 54.3 (H) 35.9 - 50.0 fL    Platelet Count 177 164 - 446 K/uL    MPV 13.7 (H) 9.0 - 12.9 fL   Comp Metabolic Panel    Collection Time: 12/10/23  2:05 AM   Result Value Ref Range    Sodium 145 135 - 145 mmol/L    Potassium 4.2 3.6 - 5.5 mmol/L    Chloride 98 96 - 112 mmol/L    Co2 38 (H) 20 - 33 mmol/L    Anion Gap 9.0 7.0 - 16.0    Glucose 124 (H) 65 - 99 mg/dL    Bun 45 (H) 8 - 22 mg/dL    Creatinine 0.85 0.50 - 1.40 mg/dL    Calcium 10.4 8.5 - 10.5 mg/dL    Correct Calcium 11.0 (H) 8.5 - 10.5 mg/dL    AST(SGOT) 213 (H) 12 - 45 U/L    ALT(SGPT) 169 (H) 2 - 50 U/L    Alkaline Phosphatase 882 (H) 30 - 99 U/L    Total Bilirubin 1.9 (H) 0.1 - 1.5 mg/dL    Albumin 3.3 3.2 - 4.9 g/dL    Total Protein 5.9 (L) 6.0 - 8.2 g/dL    Globulin 2.6 1.9 - 3.5 g/dL    A-G Ratio 1.3 g/dL   aPTT    Collection Time: 12/10/23  2:05 AM   Result Value Ref Range    APTT 27.3 24.7 - 36.0 sec   Prothrombin Time    Collection Time: 12/10/23  2:05 AM   Result Value Ref Range    PT 15.4 (H) 12.0 - 14.6 sec    INR 1.21 (H) 0.87 - 1.13   Heparin Anti-Xa    Collection Time: 12/10/23  2:05 AM   Result Value Ref Range    Heparin Xa (UFH) <0.10 IU/mL   D-DIMER    Collection Time: 12/10/23  2:05 AM    Result Value Ref Range    D-Dimer 2.01 (H) 0.00 - 0.50 ug/mL (FEU)   TROPONIN    Collection Time: 12/10/23  2:05 AM   Result Value Ref Range    Troponin T 37 (H) 6 - 19 ng/L   ESTIMATED GFR    Collection Time: 12/10/23  2:05 AM   Result Value Ref Range    GFR (CKD-EPI) 73 >60 mL/min/1.73 m 2   Arterial Blood Gas    Collection Time: 12/10/23  2:10 AM   Result Value Ref Range    Ph 7.20 (LL) 7.40 - 7.50    Pco2 101.4 (HH) 26.0 - 37.0 mmHg    Po2 72.6 64.0 - 87.0 mmHg    O2 Saturation 87.0 (L) 93.0 - 99.0 %    Hco3 38 (H) 17 - 25 mmol/L    Base Excess 7 (H) -4 - 3 mmol/L    Body Temp 37.2 Centigrade    O2 Therapy 3 liters    EKG (NOW)    Collection Time: 12/10/23  2:50 AM   Result Value Ref Range    Report       [facility]    Test Date:  2023-12-10  Pt Name:    JEREMY MATHEW                   Department: 183  MRN:        [id number]                      Room:       [facility]  Gender:     Female                       Technician: CLARICE  :        [id number]                   Requested By:FRED DEVLIN  Order #:    [id number]                    Reading MD:    Measurements  Intervals                                Axis  Rate:       141                          P:          0  MI:         0                            QRS:        -83  QRSD:       91                           T:          51  QT:         304  QTc:        466    Interpretive Statements  Atrial fibrillation  Left anterior fascicular block  Borderline low voltage, extremity leads  Probable anteroseptal infarct, old  Baseline wander in lead(s) V1  Compared to ECG 2023 21:36:43  T-wave abnormality no longer present  Myocardial infarct finding still present         Imaging  CT-HEAD W/O   Final Result      No acute intracranial abnormality.         US-RUQ   Final Result      1.  Multiple hepatic masses with heterogeneous echotexture and hepatomegaly. Recommend contrast-enhanced CT or MRI evaluation.   2.  Right perinephric fluid. Attention on cross-sectional imaging.    3.  The gallbladder is not identified and may have been surgically removed.            DX-CHEST-PORTABLE (1 VIEW)   Final Result      1.  Moderate right pleural effusion with adjacent airspace disease.   2.  Cardiomegaly.      EC-ECHOCARDIOGRAM COMPLETE W/O CONT    (Results Pending)   IR-CONSULT AND TREAT    (Results Pending)   DX-CHEST-PORTABLE (1 VIEW)    (Results Pending)   DX-ABDOMEN FOR TUBE PLACEMENT    (Results Pending)    *Personally reviewed chest x-ray showing enlarged cardiac silhouette, moderate edema with right lower lobe consolidation and probable effusion   *EKG postarrest showing atrial fibrillation with rapid ventricular response without acute STEMI   *Bedside POCUS shows mildly depressed biventricular function with dilated IVC and trace pericardial effusion, trace right-sided effusion with large amount of consolidated lung    Assessment/Plan  * Atrial fibrillation with RVR (HCC)- (present on admission)  Assessment & Plan  Presumed new onset although a brief problem list mention of A-fib in 2017  S/p electrical cardioversion to normal sinus rhythm given instability of arrhythmia 12/10  Continue amiodarone infusion status post bolus  Optimize electrolytes  Continuous telemetry monitoring  Heparin drip  Echocardiogram    Shock (HCC)  Assessment & Plan  Postcardiac arrest with depressed biventricular function on POCUS  Continue to titrate norepinephrine drip to maintain a mean arterial pressure greater than 65  Place central line and arterial catheter for supportive management  Monitor urine output, vital signs, lactic acid closely for adequacy of resuscitation  No fluid resuscitation at this time given evidence of fluid overload    Acute metabolic encephalopathy  Assessment & Plan  Likely hypercapnic induced, code stroke called on 12/10  Neurology consulted  Limit sedatives with close neurologic monitoring  Correct underlying causes  Potential need for MRI or EEG if no appropriate awakening  soon    Cardiac arrest (HCC)  Assessment & Plan  Likely respiratory arrest/hypercapnia leading to PEA s/p ROSC after approximately 6 to 7 minutes of CPR  Continue supportive care, continuous telemetry monitoring  Close neurologic monitoring  Avoid fevers  Echocardiogram, optimize pulmonary function    Pleural effusion on right  Assessment & Plan  Small on bedside ultrasound, mostly consolidated lung, bronch concerning for potential external compression  Monitor with diuresis  Recommend CT chest when stabilized    Acute on chronic respiratory failure with hypoxia and hypercapnia (HCC)- (present on admission)  Assessment & Plan  Intubated after cardiac arrest 12/10  Continue full mechanical ventilatory support with high mandatory minute ventilation and PEEP  Titrate FiO2 to goal SpO2 greater than 92%  Follow-up on postintubation arterial blood gas  Diagnostic and therapeutic bronchoscopy  Forced diuresis  RT/O2 protocol  Precedex drip with as needed fentanyl for sedation/analgesia  ABCDEF bundle  Continue empiric antibiotics for possible pneumonia, follow-up on cultures    Chronic obstructive pulmonary disease with acute exacerbation (HCC)- (present on admission)  Assessment & Plan  Continue systemic steroids with scheduled and as needed bronchodilators  RT protocol  Outpatient pulmonary follow-up and rehab    Acute pulmonary edema (HCC)  Assessment & Plan  Likely cardiac in nature  Echocardiogram  Begin forced diuresis with strict ins/outs with goal -1 to 2 L in the next 24 hours    Transaminitis  Assessment & Plan  Unclear etiology, masses seen on ultrasound  Will likely need CT chest/abdomen/pelvis for further evaluation and potential biopsy  Avoid hepatotoxins and monitor    LUIS (obstructive sleep apnea)- (present on admission)  Assessment & Plan  Nocturnal CPAP once extubated    Acquired hypothyroidism- (present on admission)  Assessment & Plan  Continue Synthroid    Morbid obesity with BMI of 45.0-49.9, adult  (HCC)- (present on admission)  Assessment & Plan  Contributing to chronic respiratory failure and cardiac disease  Encourage behavioral and dietary modification for weight loss when appropriate  RD consultation to optimize nutrition    Essential hypertension- (present on admission)  Assessment & Plan  Currently in shock  Hold antihypertensives and monitor    Hypercalcemia  Assessment & Plan  Unclear etiology, possibly malignancy  Monitor with diuresis        Discussed patient condition and risk of morbidity and/or mortality with Hospitalist, Family, RN, RT, Pharmacy, Code status disscussed, Charge nurse / hot rounds, neurology, and emergency physician .    The patient remains critically ill.  Critical care time = 135 minutes in directly providing and coordinating critical care and extensive data review.  No time overlap and excludes procedures.    Please note that this dictation was created using voice recognition software. I have made every reasonable attempt to correct obvious errors, but there may be errors of grammar and possibly content that I did not discover before finalizing the note.

## 2023-12-10 NOTE — ASSESSMENT & PLAN NOTE
Unclear etiology, masses seen on ultrasound  Will likely need CT chest/abdomen/pelvis for further evaluation and potential biopsy  Avoid hepatotoxins and monitor

## 2023-12-10 NOTE — PROCEDURES
Procedure Note    Date: 12/10/2023  Time: 0415    Procedure: Arterial Line placement  Site: R radial artery    Indication: Shock requiring continuous BP monitoring, frequent ABG monitoring  Consent: Informed consent obtained from patient or designated decision maker after explaining the benefits/risks of the procedure including but not limited to bleeding, infection, nerve or other deep structure injury, or failure of placement. Patient or surrogate expressed understanding and agreement.    Time out: Verbal consent was obtained. Immediately prior to procedure, a time out was called to verify the correct patient, procedure, equipment, support staff and site/side marked as required. Pre-procedure pain reported as n/a and post-procedure was n/a.    Procedure: After obtaining consent, a time-out was performed. An Ulisses's test was performed to test for adequate collateral circulation. Patient positioned, prepped, and draped in sterile fashion.  5 mL of local anesthetic injected (1% lidocaine without epinephrine) achieving appropriate comfort level for patient. Artery was located using realtime US/physical exam. A 18 gauge catheter was placed using Seldinger technique. Appropriate arterial blood visualized from the catheter and the arterial waveform confirmed on the monitor. Line secured and dressed in place. Patient tolerated procedure well without any difficulties and left in care of bedside nurse.     EBL: minimal  Complications: none    Jeremy Gonda, MD  Critical Care Medicine

## 2023-12-10 NOTE — PROCEDURES
Procedure Note    Date: 12/10/2023  Time: 0345    Procedure: Central Venous Line placement  Site: R IJ vein    Indication: Shock needing vasopressor support  Consent: Informed consent obtained from patient or designated decision maker after explaining the benefits/risks of the procedure including but not limited to bleeding, infection, nerve or other deep structure injury, pneumothorax/hemothorax, arrythmia, or death. Patient or surrogate expressed understanding and agreement.    Time-out: Verbal consent was obtained. Immediately prior to procedure, a time out was called to verify the correct patient, procedure, equipment, support staff and site/side marked as required. Pre-procedure pain reported as n/a and post-procedure was n/a.    Procedure: After obtaining consent, a time-out was performed. Appropriate site confirmed with ultrasound and patient positioned, prepped, and draped in sterile fashion. All those present wearing cap and mask and those physically participating remained adhering to sterile fashion with cap, mask, gloves, and gown. 5 mL of local anesthetic injected (1% lidocaine without epinephrine) achieving appropriate comfort level for patient. Vein localized and accessed using continuous ultrasound guidance and a 7 Fr triple lumen catheter placed using Seldinger technique. Able to aspirate dark, non-pulsatile blood through each lumen and sterile saline flushed easily before capping. Line secured and dressed in sterile fashion. Wire removal: After insertion of the catheter via Seldinger technique the guidewire was removed intact and confirmed by the assistant in the room.    Patient tolerated procedure well without any difficulties and remains in care of bedside nurse. CXR will be performed to confirm appropriate placement for internal jugular or subclavian CVLs.    EBL: minimal  Complications: none  CXR: appropriately positioned tip of CVL without PTX    Jeremy Gonda, MD  Critical Care  Medicine

## 2023-12-10 NOTE — ASSESSMENT & PLAN NOTE
Likely hypercapnic induced, code stroke called on 12/10  Neurology consulted  Limit sedatives with close neurologic monitoring  Correct underlying causes  Potential need for MRI or EEG if no appropriate awakening soon

## 2023-12-10 NOTE — ASSESSMENT & PLAN NOTE
Telemetry monitoring  Continue with metoprolol 50 mg XL  Check TSH  Echocardiogram  Serial troponin  [id number] 3 -start on heparin, gtt. given right pleural effusion need for IR in am and elevated LFTs  Right upper quadrant sonogram ordered r/o acute abdominal pathology requiring surgical intervention  Check magnesium goal >2

## 2023-12-10 NOTE — PROGRESS NOTES
NOC HOSPITALIST CROSS COVER    RAPID RESPONSE called for sudden alteration in mental status. The patient's last known well was 0127. On arrival to the unit, she was alert and oriented x 4 and was moving all extremities. Bedside RN presented to the room and found the patient to be minimally responsive, and only became arousable after being sternal rubbed. She was then lethargic, confused, and having left sided weakness. Rapid response was called for possible stroke. The patient was seen and examined emergently at bedside. She was lethargic, but opening her eyes and attempting to pull off her oxygen mask. She was intermittently following commands. Her pupils were 2-3 mm and sluggish. She had left sided weakness of the upper and lower extremity, but no facial droop.     Due to her sudden change in mentation, a dose of Narcan was trialed, but was unsuccessful. Her BG was 124. The patient was satting 93% on 3 L and maintaining her airway. STAT labs were obtained and then a code STROKE was activated. On the way down to CT, the patient tried to take her oxygen mask off repeatedly, but did follow commands to stop. Once in CT, she was placed on the scanner table. Immediately prior to the start of the scan, the patient's pulse ox was not reading. The patient was noted to be agonal breathing, cyanotic, then pulseless. She was pulled out of the CT scanner. CPR was started immediately. After 3 rounds of BLS and 2 rounds of ACLS CPR, ROSC was obtained. Her initial rhythm was PEA arrest. The ERP Mitchel Vazquez presented to bedside and intubated the patient. Dr Cazares, neurology, was also present. Dr Gonda presented to the bedside.     Initial workup included                        : STAT CT head, CBC, CMP, ammonia, lactic, d-dimer, ABG, troponin, and PT/INR. Her ABG resulted during the code and demonstrated severe hypercapnia, with a CO2 of 101.4 and a pH of 7.20.    Vitals:    12/10/23 0315   BP: 118/78   Pulse: 84   Resp: (!) 22    Temp:    SpO2: 90%        Physical Exam  Constitutional:       Appearance: She is obese. She is ill-appearing.   Cardiovascular:      Rate and Rhythm: Normal rate. Rhythm irregular.   Pulmonary:      Breath sounds: Examination of the right-lower field reveals decreased breath sounds. Examination of the left-lower field reveals decreased breath sounds. Decreased breath sounds present.   Abdominal:      General: Abdomen is protuberant.      Palpations: Abdomen is soft.   Neurological:      Mental Status: She is lethargic and confused.      GCS: GCS eye subscore is 3. GCS verbal subscore is 3. GCS motor subscore is 6.      Cranial Nerves: No cranial nerve deficit.      Motor: Weakness (LUE and LLE weakness against gravity) present.      Coordination: Coordination abnormal.        Plan:  #Altered mental status  -CODE STROKE called with last known well of 0127  -BG was 124  -Likely secondary to hypercapnic respiratory failure  -CT head negative for acute intracranial abnormalities  -Ammonia ordered and pending  -See neuro note - will defer stroke workup pending stabilization    #Cardiac arrest  -Likely respiratory arrest secondary to hypercapnea  -Initial rhythm PEA  -Intubated by ERP Dr Vazquez  -ROSC obtained following 3 rounds BLS and 2 rounds ACLS    Patient to be transferred to ICU under the care of Dr Gonda, intensivist. The patient remains critically ill and is at high risk of death and decompensation. Please see Dr Gonda's consult notes for further details and an updated plan of care.     -----------------------------------------------------------------------------------------------------------    Electronically signed by:  Alanna Landrum, OTONIEL, NOLA, IANP-BC  Hospitalist Services

## 2023-12-10 NOTE — ASSESSMENT & PLAN NOTE
Intubated after cardiac arrest 12/10  Continue full mechanical ventilatory support with high mandatory minute ventilation and PEEP  Titrate FiO2 to goal SpO2 greater than 92%  Follow-up on postintubation arterial blood gas  Diagnostic and therapeutic bronchoscopy  Forced diuresis  RT/O2 protocol  Precedex drip with as needed fentanyl for sedation/analgesia  ABCDEF bundle  Continue empiric antibiotics for possible pneumonia, follow-up on cultures

## 2023-12-10 NOTE — PROGRESS NOTES
I spoke with patient's sister/TAHIRA Purcell ([phone]) over the phone regarding the events of this morning with Kori.  She had been notified during the cardiac arrest.  She states that the patient would not want to be on life support for any extended period of time but she is okay continuing what we are doing for the time being.  I told her that in the next 24 to 48 hours we should have a better sense of her prognosis with additional testing.  She would like to change the CODE STATUS to DNR should Kori suffer another cardiac arrest and I made the changes in epic.  She will not be able to come visit today as she lives in [facility] but will make every attempt to come on Monday to discuss goals of care for her sister.

## 2023-12-10 NOTE — PROGRESS NOTES
0328 3 mg versed given per MD at bedside  MD Gonda to place central line  0329  time out, all agree  0333 procedure start  0336 guide wire out  0338 central line sutured in place  0340 end central line placement

## 2023-12-10 NOTE — ED TRIAGE NOTES
"Mallorie Xie  68 y.o. female  Chief Complaint   Patient presents with    Weakness     Failure to thrive, unable to take care of self at home. Home health seeing patient at home. -falls. States weakness is in her legs bilaterally, pt states she is unable to walk now.     Shortness of Breath     Pt states \"its been a long time\" arrives on 4L NC to which is pts home O2. Hx COPD.    Arrhythmia     Pt presents in Afib RVR up to 160s. EKG complete, pt states she takes no medication for Afib.     BIB [facility] from home for above. Pt arrives in Afib with shortness of breath and weakness. Reports not taking home medications recently.     Pt is alert, oriented, and follows commands. Pt speaking in full sentences and responds appropriately to questions. No acute distress noted in triage and respirations are even and unlabored.     Pt to red 06, care assumed, shortness of breath protocol ordered, EKG complete. Chart up for ERP.     Vitals:    12/09/23 2136   BP: (!) 145/73   Pulse: (!) 161   Resp: 20   SpO2: 92%       "

## 2023-12-10 NOTE — ASSESSMENT & PLAN NOTE
Likely respiratory arrest/hypercapnia leading to PEA s/p ROSC after approximately 6 to 7 minutes of CPR  Continue supportive care, continuous telemetry monitoring  Close neurologic monitoring  Avoid fevers  Echocardiogram, optimize pulmonary function

## 2023-12-10 NOTE — PROGRESS NOTES
Stroke neuro note    68F admitted for atrial fibrillation with RVR, SOB, initially neurologically intact, then acutely appeared altered, disoriented, with L side weakness. She was about to be initiated on heparin therapy for anticoagulation, but this was not started.  She was not anticoagulated as an outpatient.    While in CT, she had PEA arrest triggering a code blue.  ABG with PCO2 > 100.  Will defer stroke evaluation pending medical stabilization.      Feliciano Cazares MD  Stroke Neurology

## 2023-12-10 NOTE — ASSESSMENT & PLAN NOTE
C/w Unasyn  Stated on heparin, gtt as opposed to NOAC given need for possible thoracentesis. F/u IR for thoracentesis

## 2023-12-10 NOTE — ASSESSMENT & PLAN NOTE
Uncertain etiology  but in combination with liver masses, and history of lung CA, possible recurrence  Overall picture concerning for malignancy with hyperCA, liver masses, and failure, shock and pleural effusion      Need drainage and cytology/cxs  Will clarify GOC prior to invasive procedure and chest tube  Pending Chest CT, too unstable for trasnport

## 2023-12-10 NOTE — ASSESSMENT & PLAN NOTE
Postcardiac arrest with depressed biventricular function on POCUS  Multifactorial  Unstable patient not safe for transport to CT, but needs PE study and additional imaging, PE certainly on the differential, ut not a great candidate for empiric lytics based on preceding event with suggestion of acute ischemic stroke with significant uncertainty given LV dysfunction and pulmonary edema    Continue empiric heparin gtt in meantime  Continue to titrate norepinephrine drip to maintain a mean arterial pressure greater than 65  Add vasopressin  Monitor urine output, vital signs, lactic acid closely for adequacy of resuscitation

## 2023-12-10 NOTE — DIETARY
"Nutrition services: Day 1 of admit.  Mallorie Xie is a 68 y.o. female with a history of COPD, chronic respiratory failure, sleep apnea, hypertension, morbid obesity who presented with failure to thrive and weakness.  Complains of shortness of breath, has bilateral lower extremity edema.    Patient with unintentional weight loss noted on admit screen.    Assessment:  Height: 160 cm (5' 3\")  Weight: 123 kg (271 lb 9.7 oz)  Body mass index is 48.11 kg/m².Class III obesity  Diet/Intake: NPO    Evaluation:   Patient is currently intubated and s/p cardiac arrest  Weight history per Epic shows 16# or ~6% weight loss in the past three months  This in notable, but not significant; additionally may have some fluid component  For high BMI, weight loss counseling not appropriate in acute care setting.     Malnutrition Risk: Does not meet criteria at this time based on history available.    RD following plan of care given critical care status and will make nutrition recommendations as appropriate.          "
medically, orthopedically, neurovascularly stable for discharge home

## 2023-12-11 NOTE — CARE PLAN
Problem: Ventilation  Goal: Ability to achieve and maintain unassisted ventilation or tolerate decreased levels of ventilator support  Description: Target End Date:  4 days     Document on Vent flowsheet    1.  Support and monitor invasive and noninvasive mechanical ventilation  2.  Monitor ventilator weaning response  3.  Perform ventilator associated pneumonia prevention interventions  4.  Manage ventilation therapy by monitoring diagnostic test results  Outcome: Not Met       Ventilator Daily Summary    Vent Day # 1    Airway: 7.5 @ 25    Ventilator settings: APV/CMV 30, 320, + 10, 70%    Weaning trials: None    Respiratory Procedures: None    Plan: Continue current ventilator settings and wean mechanical ventilation as tolerated per physician orders.

## 2023-12-11 NOTE — PROGRESS NOTES
All care withdrawn at 1925. Patient pronounced at 1937 by 2 RN's. Dr. Merritt notified. Sister, Jazzy, notified via telephone call. Per Jazzy, patient has mortuary picked out and will call back tomorrow with which mortuary.

## 2023-12-11 NOTE — ACP (ADVANCE CARE PLANNING)
Advanced Care Planing and Goals of Care       Medical decision maker: Sister, Jazzy    Present: Jazzy and I had an extensive discussion over the phone    Narrative of discussion:   I was able to have any extensive discussion over the phone with Jazzy.  Jazzy is Giovanna is only living family member and also her medical power of  and also her support when it comes to medical concerns and other.  Apparently states that he does not really have any other family and lives alone in [place] and has for many years.    First I went over the clinical situation and Giovanna's course here in the ICU.  We discussed her events overnight, her admission, the diagnoses and the diagnostic test that have been pursued and some of the results there and potential interpretations of those.  We went over the events of last night including her cardiac arrest, subsequent resuscitation and now her multi pressor shock and multiorgan failure and significant lung and heart and liver and kidney injury.    City was able to share with me some of the conversations that they have had recently in which Giovanna has made it very clear that she did not want heroic measures or to ever be on life support.  Apparently Giovanna has had significant declining illness for months if not longer and essential generalized functional decline which has been pretty rapid over the course of several months.  She apparently has oxygen dependent chronic lung disease and disease attributable to COPD, has a history of breast cancer which apparently was treated, but she has had worsening health in general for months and has refused to go to an assisted living and has demanded to stay at home in her home in [place] in spite of recommendations from Jazzy in the past.  Jazzy has tried to get her to move to [place] so she could be closer.    At any rate send he shared with me that said he had recently in fact had a discussion with her in the context of previous  "hospitalizations recently that she really knew that she was getting very ill and that she was likely to die soon and she had expressed to her that she did not want any life support or heroic measures or any sort of hyper invasive care.  Send even said that \"she knew this was coming, she is already told me that she did not want any significant procedures\".    I discussed with her that there was a number of findings in her workup including a large pleural effusion, numerous masses in her liver, associated with acute liver failure in fact, refractory atrial fibrillation, and urea and very high vent settings indicating multiorgan failure and that her ultrasound of her heart showed severely depressed function and that the diagnosis was not entirely clear and that we were unable to get her to CT scan today because of her instability.    At that point send he said \"I do not see any value in running and any of those test\" she is already told me that she does not want anything\"    \"She does not want to be on life support, she does not want to be on machines\"    Initially Jazzy expressed to me that she would like to wait for her arrival tomorrow, and asked if we could keep her on support overnight, after I discussed the current situation she was worried that Giovanna could be suffering currently and given the fact that she had already expressed that she did not want this, she had decided that she would like us to transition her to comfort care.  She does tell me that they had recently spent some time together and she felt that she had had that time with her and was grateful for it but she did not want to see Giovanna suffer any further.    Without we have transition her to comfort care.    Summary: Comfort care    Time statement:  I discussed advance care planning with the patient's family and patient's DPOA for at least 35 minutes, including diagnosis, prognosis, plan of care, risks and benefits of any therapies that could be " offered, as well as alternatives including palliation and hospice, as appropriate, exclusive of evaluation and management or other separately billable procedures.

## 2023-12-12 LAB
AFP-TM SERPL-MCNC: 13 NG/ML (ref 0–9)
BACTERIA SPEC RESP CULT: NORMAL
SIGNIFICANT IND 70042: NORMAL
SITE SITE: NORMAL
SOURCE SOURCE: NORMAL

## 2023-12-14 LAB
BACTERIA BLD CULT: NORMAL
SIGNIFICANT IND 70042: NORMAL
SITE SITE: NORMAL
SOURCE SOURCE: NORMAL

## 2023-12-15 LAB
BACTERIA BLD CULT: NORMAL
SIGNIFICANT IND 70042: NORMAL
SITE SITE: NORMAL
SOURCE SOURCE: NORMAL

## 2023-12-19 NOTE — DISCHARGE SUMMARY
"Death Summary    Cause of Death  Cardiac arrest in the setting of comfort care due to multiorgan failure due to multifactorial shock due to PEA arrest due to respiratory failure due to COPD and presumed community-acquired pneumonia.    Comorbid Conditions at the Time of Death  Principal Problem:    Atrial fibrillation with RVR (HCC) (POA: Yes)  Active Problems:    Essential hypertension (POA: Yes)    Morbid obesity with BMI of 45.0-49.9, adult (HCC) (POA: Yes)    Chronic obstructive pulmonary disease with acute exacerbation (HCC) (POA: Yes)    Acquired hypothyroidism (POA: Yes)    LUIS (obstructive sleep apnea) (POA: Yes)    Acute on chronic respiratory failure with hypoxia and hypercapnia (HCC) (POA: Yes)    Pleural effusion on right (POA: Unknown)    Transaminitis (POA: Unknown)    Cardiac arrest (HCC) (POA: Unknown)    Acute metabolic encephalopathy (POA: Unknown)    Acute pulmonary edema (HCC) (POA: Unknown)    Shock (HCC) (POA: Unknown)    Hypercalcemia (POA: Unknown)  Resolved Problems:    * No resolved hospital problems. *      History of Presenting Illness and Hospital Course  \"68 y.o. female who presented 12/9/2023 with a past medical history significant for morbid obesity, congestive heart failure, obstructive sleep apnea, hypertension who was brought in by EMS from home for inability to care for herself at home with diffuse weakness, leg swelling, shortness of breath despite her 4 L/min nasal cannula which she uses at home.  She was found to be in atrial fibrillation with rapid ventricular response with a heart rate in the 160s which apparently was new for her.  She was admitted to the hospital in October of this year for pneumonia and hyponatremia and discharged home with home health.  Patient was given 400 cc of fluid and 5 of IV metoprolol x 3 doses with oral loading eventually bring her heart rate down to 110.  She was given Unasyn and azithromycin for presumed community-acquired pneumonia and admitted " "to the hospitalist services.  She was to be started on a heparin drip and was started on Solu-Medrol and bronchodilators for presumed COPD exacerbation.  Early this morning, patient was initially alert and oriented x 4 with increasing oxygen requirements but at 0 150 a rapid response was called as she became altered and had left-sided weakness.  A code stroke was called and she was taken down to the CT scan for imaging.  Unfortunately when they laid her flat she went agonal and went into a PEA arrest.  A supraglottic airway was placed and she was given 2 mg of epinephrine and underwent 3 rounds of CPR before return of spontaneous circulation.  Postarrest, she remains in atrial fibrillation with rapid ventricular response and hypotension and was started on a norepinephrine drip.  A head CT without contrast was performed at the request of neurology which did not show any acute process and a CTA of her head and neck will be canceled for now.  She was loaded with IV amiodarone and started on a drip and transferred to the intensive care unit after electrical cardioversion for worsening shock with A-fib with RVR. \" Gonda H+P     Based on the aforementioned events the patient was transferred to the ICU for continued management.  She required escalating doses of vasopressors and was in severe hypoxemic respiratory failure, probable ARDS with high vent support, oliguric, with worsening organ function and requiring significant amount of life support.  Bedside cardiac ultrasound showed diffuse severely depressed left ventricular function and dyskinesis, volume overload, and continue to worsen during the hours following her ICU transfer.    I was able to have a long discussion with her power of  and sister, Jazzy, who was very concerned and expressed to me that it had previously been Kori's expressed wish to not be on life support and not undergo hyper invasive therapies such as ICU care, breathing machines or any form " of life support.  Given the circumstances with her worsening function, multiorgan failure, I expressed to Jazzy that it was undoubtedly going to be a prolonged ICU course and significant debilitation and rehab.,  At which point she made clear to me that this was not in line with Kori's wishes.  Therefore she was transferred to comfort care shortly thereafter passed away peacefully under comfort measures.    Death Date: 12/10/23   Death Time: 1937

## 2024-01-10 ENCOUNTER — APPOINTMENT (OUTPATIENT)
Dept: MEDICAL GROUP | Facility: PHYSICIAN GROUP | Age: 69
End: 2024-01-10
Payer: MEDICARE

## 2024-06-07 NOTE — ASSESSMENT & PLAN NOTE
Chronic in nature, stable and well-controlled on current regimen. Her blood pressures 124/82 and she denies symptoms of hypertension.  she tolerates her medication well with no significant bothersome side effects. She will be due for labs again in 6 months, these have been ordered. She has to have these done before she sees me for follow-up. She is to continue with healthy eating, regular physical activity and continued efforts towards weight loss.  
Patient recently diagnosed with pulmonary emphysema, started on Advair. She also uses as needed albuterol. She is a former smoker, quit in October 2016. She developed upper respiratory infection in the winter 3918-2455 which resulted in hypoxemia that took quite some time to resolve. She was then referred to pulmonology who later diagnosed her with COPD as well as uncomplicated asthma. PFT showed  moderately severe obstructive ventilatory defect, with significant bronchodilator response and reduced expiratory reserve volume secondary to obesity.She is currently being worked up for obstructive sleep apnea and has an appointment at the end of July for sleep study. Patient is not quite convinced that she has asthma and feels the most for symptoms are COPD because of her past smoking history.  She does continue to have desaturations with activity, in fact her O2 saturation was 85% when she walked from the waiting room into examining room today, but with a few deep breaths, her saturations came up to 90%. During her last visit with pulmonology on May 30, her O2 saturations on room air were 96%. She states that overall her breathing feels like it is better. She does deny cough, shortness of breath, wheezing or any other signs or symptoms of respiratory infection. She continues on her medications without significant or bothersome side effects. She feels that her symptoms will significantly improve if she can lose some weight and continues to work on this. However she is concerned that every time she exercises her O2 levels go down. I encouraged her to take 2 puffs of albuterol before any kind of exertion. I also encouraged her to try resistance-type exercises as opposed to excessive aerobic-type exercise that may increase her heart rate or respirations symptoms yoga.  
This is a chronic condition, stable and well-controlled on current dose of levothyroxine, 50 µg. Her most recent TSH is within normal limits at 2.80. She is tolerating medication well with no significant bothersome side effects. She does need refills, these have been called in for her. She does deny symptoms of hypothyroidism. We will recheck this in 6 months.  
5

## 2024-11-11 NOTE — PROGRESS NOTES
Hospital Medicine Daily Progress Note    Date of Service  10/23/2023    Chief Complaint  Mallorie Xie is a 68 y.o. female admitted 10/20/2023 with hyponatremia.    Hospital Course  Ms. Xie is a 68 y.o. female with past medical history of COPD oxygen dependent on 4 L, CHF, hypothyroidism, sleep apnea, hypertension, GERD, who presented 10/20/2023 with complaints of dyspnea worsening on exertion, palpitations, occasional cough with white sputum, worsening of oxygen requirement.  In ER she is on 4 L of oxygen which is her baseline at rest.  Respirations are mildly elaborated, slightly hypertensive 168/77, afebrile.  COVID-19/influenza/RSV screen is negative.  Chemistry showed sodium 118, chloride 76.  CBC showed WBC 11.0, hemoglobin 11.7.  Troponin is not elevated.  Chest x-ray showed right basilar consolidation.  Mild cardiomegaly.  She was taking lisinopril/hydrochlorothiazide for blood pressure.  Dose of levothyroxine was increased from 50 to 100 mcg by her PCP a few weeks ago.  She was cutting in a half prescribed dose of levothyroxine and continue taking 50 mcg as she believed 100 mcg was making her feeling worse.  Oral intake of food and fluids has been decreased in the last 3 days.  She did not want to drink fluids because it was making her going to the bathroom Frequently.  In ER she was given Unasyn and azithromycin, as well as prednisone 60 mg once.       Interval Problem Update  10/21: Ms. Xie was evaluated and examined in the IMCU. She has been on NS at 50 mL/hour and her sodium remains severely low at 118 x 3 values. She confirms that she had not been drinking much water or fluids prior to coming in the hospital. Her condition is consistent with hypovolemic hyponatremia. IV NS will be increased to 125 mL/hour. Q4 hour Na checks.  Addendum at 16:00: Na went down to 117 and now 118 thus will change strategy to consider occult volume overload despite her story and lack of physical exam findings to not  reflect it. Stop IV fluids and start Lasix 40 mg IV bid with KCl.    10/22: Ms. Xie was evaluated and examined in the IMCU. She has been on IV lasix 40 mg BID with potassium and her Na has gone from 118-->121-->124-->125-->123. She will get up to chair today.   10/23: Ms. Xie was seen and evaluated in the IMCU. She has been on IV lasix 40 mg BID and Na has been 123-->128-->129-->129. Today will change to oral lasix. PT/OT requested for post-acute evaluations.     I have discussed this patient's plan of care and discharge plan at IDT rounds today with Case Management, Nursing, Nursing leadership, and other members of the IDT team.    Consultants/Specialty  none    Code Status  Full Code    Disposition  The patient is not medically cleared for discharge to home or a post-acute facility.  Anticipate discharge to: skilled nursing facility    I have placed the appropriate orders for post-discharge needs.    Review of Systems  Review of Systems   Constitutional:  Negative for chills and fever.        Generally weak   Respiratory:  Negative for shortness of breath.    Cardiovascular:         Swelling feet   Gastrointestinal:  Negative for nausea and vomiting.   Genitourinary:         She is using a purwick    All other systems reviewed and are negative.       Physical Exam  Temp:  [36.4 °C (97.6 °F)-36.7 °C (98.1 °F)] 36.4 °C (97.6 °F)  Pulse:  [] 100  Resp:  [18-42] 20  BP: (111-148)/(54-84) 129/58  SpO2:  [83 %-99 %] 95 %    Physical Exam  Vitals and nursing note reviewed.   Constitutional:       Appearance: She is obese. She is not ill-appearing or toxic-appearing.   HENT:      Mouth/Throat:      Mouth: Mucous membranes are dry.   Eyes:      Conjunctiva/sclera: Conjunctivae normal.   Cardiovascular:      Rate and Rhythm: Normal rate and regular rhythm.   Pulmonary:      Effort: Pulmonary effort is normal.      Comments: Moderate air movement  No wheezing  Abdominal:      General: There is no distension.       Palpations: Abdomen is soft.      Tenderness: There is no abdominal tenderness.   Musculoskeletal:      Right lower leg: No edema.      Left lower leg: No edema.   Skin:     General: Skin is dry.   Neurological:      General: No focal deficit present.      Mental Status: She is oriented to person, place, and time.   Psychiatric:         Mood and Affect: Mood normal.         Behavior: Behavior normal.         Thought Content: Thought content normal.         Judgment: Judgment normal.         Fluids    Intake/Output Summary (Last 24 hours) at 10/23/2023 0658  Last data filed at 10/23/2023 0629  Gross per 24 hour   Intake --   Output 1851 ml   Net -1851 ml         Laboratory  Recent Labs     10/20/23  2045 10/21/23  0238   WBC 11.0* 9.2   RBC 3.88* 4.01*   HEMOGLOBIN 11.7* 11.8*   HEMATOCRIT 33.5* 34.5*   MCV 86.3 86.0   MCH 30.2 29.4   MCHC 34.9 34.2   RDW 39.0 39.1   PLATELETCT 214 173   MPV 11.7 12.1       Recent Labs     10/21/23  0238 10/21/23  0621 10/22/23  0240 10/22/23  0607 10/22/23  1630 10/22/23  2318 10/23/23  0400   SODIUM 118*   < > 124*   < > 128* 129* 129*   POTASSIUM 4.3  --  3.9  --   --   --  3.9   CHLORIDE 78*  --  84*  --   --   --  87*   CO2 28  --  33  --   --   --  36*   GLUCOSE 112*  --  104*  --   --   --  107*   BUN 10  --  12  --   --   --  11   CREATININE 0.39*  --  0.58  --   --   --  0.55   CALCIUM 9.4  --  8.9  --   --   --  9.5    < > = values in this interval not displayed.                     Imaging  DX-CHEST-PORTABLE (1 VIEW)   Final Result      1.  Right basilar consolidation suspicious for pneumonitis.   2.  Mild enlargement of the cardiomediastinal silhouette.           Assessment/Plan  * Hyponatremia- (present on admission)  Assessment & Plan  Sodium was severely low at 118.    At baseline has normal sodium.  Likely exacerbated by hydrochlorothiazide which has been stopped  She may have concomitant SIADH in the setting of pneumonia  Admitted to [facility] for close monitoring  She  described very low oral liquid intake thus was treated with IV NS for presumptive hypovolemic hyponatremia and her Na went down thus IV lasix 40 mg BID and restrict free water 1,800 mL/day.  Na went up to 129. On 10/23 we will liberalize her Lasix to oral twice a day and every 12 hour checks.        Pneumonia  Assessment & Plan  68-year-old female with history of morbid obesity, oxygen dependent COPD, CHF, sleep apnea presented with shortness of breath, cough for 2 weeks, worsening of hypoxia at home  Chest x-ray: Right lower lobe consolidation  COVID-19/RSV/influenza negative  Plan: Ceftriaxone, azithromycin   Serum procalcitonin mildly elevated at 0.3.       Chronic respiratory failure with hypoxia (HCC)- (present on admission)  Assessment & Plan  On 4 L nasal cannula  We will plan to treat for pneumonia and do a walking test to assess true oxygen requirement  Pulse oximetry, incentive spirometry      Acquired hypothyroidism- (present on admission)  Assessment & Plan  TSH is within normal limits at 2.77  We will continue current dose of levothyroxine 50 mcg    COPD (chronic obstructive pulmonary disease) (HCC)  Assessment & Plan  Without exacerbation.  She was given 50 mg of prednisone in ER, will not continue      Morbid obesity with BMI of 45.0-49.9, adult (HCC)- (present on admission)  Assessment & Plan  BMI 50    Essential hypertension- (present on admission)  Assessment & Plan  Will hold Prinzide  We will start amlodipine instead         VTE prophylaxis:    enoxaparin ppx      I have performed a physical exam and reviewed and updated ROS and Plan today (10/23/2023). In review of yesterday's note (10/22/2023), there are no changes except as documented above.         Wound of skin

## (undated) DEVICE — SUTURE 2-0 SILK FS 18 (36PK/BX) DISCONTINUED USE #34812, LOWER UOM SAME PART # WITH G AT THE END"

## (undated) DEVICE — BOVIE BLADE COATED &INSULATED - 25/PK

## (undated) DEVICE — DRESSING NON-ADHERING 8 X 3 - (50/BX)

## (undated) DEVICE — HEAD HOLDER JUNIOR/ADULT

## (undated) DEVICE — BLADE SURGICAL #10 - (50/BX)

## (undated) DEVICE — SET LEADWIRE 5 LEAD BEDSIDE DISPOSABLE ECG (1SET OF 5/EA)

## (undated) DEVICE — GLOVE BIOGEL INDICATOR SZ 6.5 SURGICAL PF LTX - (50PR/BX 4BX/CA)

## (undated) DEVICE — GLOVE BIOGEL SZ 6 PF LATEX - (50EA/BX 4BX/CA)

## (undated) DEVICE — ELECTRODE 850 FOAM ADHESIVE - HYDROGEL RADIOTRNSPRNT (50/PK)

## (undated) DEVICE — SENSOR SPO2 NEO LNCS ADHESIVE (20/BX) SEE USER NOTES

## (undated) DEVICE — LACTATED RINGERS INJ 1000 ML - (14EA/CA 60CA/PF)

## (undated) DEVICE — MASK ANESTHESIA ADULT  - (100/CA)

## (undated) DEVICE — KIT  I.V. START (100EA/CA)

## (undated) DEVICE — WATER IRRIGATION STERILE 1000ML (12EA/CA)

## (undated) DEVICE — SUTURE 3-0 VICRYL PLUS SH - 8X 18 INCH (12/BX)

## (undated) DEVICE — GAUZE FLUFF STERILE 2-PLY 36 X 36 (100EA/CA)

## (undated) DEVICE — PROTECTOR ULNA NERVE - (36PR/CA)

## (undated) DEVICE — CANISTER SUCTION RIGID RED 1500CC (40EA/CA)

## (undated) DEVICE — SUTURE GENERAL

## (undated) DEVICE — ELECTRODE DUAL RETURN W/ CORD - (50/PK)

## (undated) DEVICE — CANISTER SUCTION 3000ML MECHANICAL FILTER AUTO SHUTOFF MEDI-VAC NONSTERILE LF DISP  (40EA/CA)

## (undated) DEVICE — TUBING CLEARLINK DUO-VENT - C-FLO (48EA/CA)

## (undated) DEVICE — DRESSING TRANSPARENT FILM TEGADERM 2.375 X 2.75"  (100EA/BX)"

## (undated) DEVICE — TRAY SKIN SCRUB PVP WET (20EA/CA) PART #DYND70356 DISCONTINUED

## (undated) DEVICE — TUBE CONNECTING SUCTION - CLEAR PLASTIC STERILE 72 IN (50EA/CA)

## (undated) DEVICE — PACK MINOR BASIN - (2EA/CA)

## (undated) DEVICE — SLEEVE, VASO, THIGH, MED

## (undated) DEVICE — SUTURE 3-0 ETHILON FS-1 - (36/BX) 30 INCH

## (undated) DEVICE — KIT ANESTHESIA W/CIRCUIT & 3/LT BAG W/FILTER (20EA/CA)

## (undated) DEVICE — CATHETER IV 20 GA X 1-1/4 ---SURG.& SDS ONLY--- (50EA/BX)

## (undated) DEVICE — NEPTUNE 4 PORT MANIFOLD - (20/PK)

## (undated) DEVICE — BANDAGE ELASTIC STERILE VELCRO 6 X 5 YDS (25EA/CA)

## (undated) DEVICE — SUCTION INSTRUMENT YANKAUER BULBOUS TIP W/O VENT (50EA/CA)

## (undated) DEVICE — PAD LAP STERILE 18 X 18 - (5/PK 40PK/CA)

## (undated) DEVICE — SUTURE 4-0 MONOCRYL PLUS PS-2 - 27 INCH (36/BX)

## (undated) DEVICE — SODIUM CHL IRRIGATION 0.9% 1000ML (12EA/CA)

## (undated) DEVICE — GOWN WARMING STANDARD FLEX - (30/CA)

## (undated) DEVICE — SHEET TRANSVERSE LAP - (12EA/CA)